# Patient Record
Sex: FEMALE | Race: WHITE | NOT HISPANIC OR LATINO | Employment: OTHER | URBAN - METROPOLITAN AREA
[De-identification: names, ages, dates, MRNs, and addresses within clinical notes are randomized per-mention and may not be internally consistent; named-entity substitution may affect disease eponyms.]

---

## 2017-02-15 DIAGNOSIS — I10 ESSENTIAL HYPERTENSION: ICD-10-CM

## 2017-02-15 RX ORDER — LOSARTAN POTASSIUM 50 MG/1
50 TABLET ORAL DAILY
Qty: 90 TABLET | Refills: 3 | Status: SHIPPED | OUTPATIENT
Start: 2017-02-15 | End: 2017-04-13 | Stop reason: SDUPTHER

## 2017-03-13 ENCOUNTER — LAB VISIT (OUTPATIENT)
Dept: LAB | Facility: HOSPITAL | Age: 72
End: 2017-03-13
Payer: MEDICARE

## 2017-03-13 ENCOUNTER — OFFICE VISIT (OUTPATIENT)
Dept: FAMILY MEDICINE | Facility: CLINIC | Age: 72
End: 2017-03-13
Payer: MEDICARE

## 2017-03-13 VITALS
HEART RATE: 101 BPM | HEIGHT: 63 IN | TEMPERATURE: 99 F | WEIGHT: 157.63 LBS | OXYGEN SATURATION: 98 % | SYSTOLIC BLOOD PRESSURE: 133 MMHG | BODY MASS INDEX: 27.93 KG/M2 | DIASTOLIC BLOOD PRESSURE: 68 MMHG

## 2017-03-13 DIAGNOSIS — E78.5 HYPERLIPIDEMIA, UNSPECIFIED HYPERLIPIDEMIA TYPE: ICD-10-CM

## 2017-03-13 DIAGNOSIS — Z23 ENCOUNTER FOR IMMUNIZATION: Primary | ICD-10-CM

## 2017-03-13 DIAGNOSIS — E11.9 TYPE 2 DIABETES MELLITUS WITHOUT COMPLICATION, WITHOUT LONG-TERM CURRENT USE OF INSULIN: ICD-10-CM

## 2017-03-13 DIAGNOSIS — F41.8 ANXIETY ASSOCIATED WITH DEPRESSION: ICD-10-CM

## 2017-03-13 LAB
ALBUMIN SERPL BCP-MCNC: 4.2 G/DL
ALP SERPL-CCNC: 90 U/L
ALT SERPL W/O P-5'-P-CCNC: 47 U/L
ANION GAP SERPL CALC-SCNC: 13 MMOL/L
AST SERPL-CCNC: 59 U/L
BILIRUB SERPL-MCNC: 0.9 MG/DL
BUN SERPL-MCNC: 13 MG/DL
CALCIUM SERPL-MCNC: 10.2 MG/DL
CHLORIDE SERPL-SCNC: 102 MMOL/L
CO2 SERPL-SCNC: 26 MMOL/L
CREAT SERPL-MCNC: 1.4 MG/DL
EST. GFR  (AFRICAN AMERICAN): 43 ML/MIN/1.73 M^2
EST. GFR  (NON AFRICAN AMERICAN): 37.3 ML/MIN/1.73 M^2
GLUCOSE SERPL-MCNC: 154 MG/DL
POTASSIUM SERPL-SCNC: 4.1 MMOL/L
PROT SERPL-MCNC: 8.5 G/DL
SODIUM SERPL-SCNC: 141 MMOL/L

## 2017-03-13 PROCEDURE — 1126F AMNT PAIN NOTED NONE PRSNT: CPT | Mod: S$GLB,,,

## 2017-03-13 PROCEDURE — 99499 UNLISTED E&M SERVICE: CPT | Mod: S$GLB,,,

## 2017-03-13 PROCEDURE — 99214 OFFICE O/P EST MOD 30 MIN: CPT | Mod: 25,S$GLB,,

## 2017-03-13 PROCEDURE — 1159F MED LIST DOCD IN RCRD: CPT | Mod: S$GLB,,,

## 2017-03-13 PROCEDURE — 3078F DIAST BP <80 MM HG: CPT | Mod: S$GLB,,,

## 2017-03-13 PROCEDURE — 90662 IIV NO PRSV INCREASED AG IM: CPT | Mod: S$GLB,,,

## 2017-03-13 PROCEDURE — 99999 PR PBB SHADOW E&M-EST. PATIENT-LVL III: CPT | Mod: PBBFAC,,,

## 2017-03-13 PROCEDURE — G0008 ADMIN INFLUENZA VIRUS VAC: HCPCS | Mod: S$GLB,,,

## 2017-03-13 PROCEDURE — 4010F ACE/ARB THERAPY RXD/TAKEN: CPT | Mod: S$GLB,,,

## 2017-03-13 PROCEDURE — 1160F RVW MEDS BY RX/DR IN RCRD: CPT | Mod: S$GLB,,,

## 2017-03-13 PROCEDURE — 1157F ADVNC CARE PLAN IN RCRD: CPT | Mod: S$GLB,,,

## 2017-03-13 PROCEDURE — 36415 COLL VENOUS BLD VENIPUNCTURE: CPT | Mod: PO

## 2017-03-13 PROCEDURE — 3045F PR MOST RECENT HEMOGLOBIN A1C LEVEL 7.0-9.0%: CPT | Mod: S$GLB,,,

## 2017-03-13 PROCEDURE — 3075F SYST BP GE 130 - 139MM HG: CPT | Mod: S$GLB,,,

## 2017-03-13 PROCEDURE — 83036 HEMOGLOBIN GLYCOSYLATED A1C: CPT

## 2017-03-13 PROCEDURE — 80053 COMPREHEN METABOLIC PANEL: CPT

## 2017-03-13 RX ORDER — METOPROLOL SUCCINATE 100 MG/1
100 TABLET, EXTENDED RELEASE ORAL DAILY
Qty: 90 TABLET | Refills: 3 | Status: SHIPPED | OUTPATIENT
Start: 2017-03-13 | End: 2018-04-13 | Stop reason: SDUPTHER

## 2017-03-13 RX ORDER — ROPINIROLE 1 MG/1
TABLET, FILM COATED ORAL
COMMUNITY
Start: 2017-02-16 | End: 2017-06-15 | Stop reason: SDUPTHER

## 2017-03-13 RX ORDER — DIAZEPAM 5 MG/1
5 TABLET ORAL EVERY 6 HOURS PRN
Qty: 40 TABLET | Refills: 5 | Status: SHIPPED | OUTPATIENT
Start: 2017-03-13 | End: 2018-04-13 | Stop reason: ALTCHOICE

## 2017-03-13 RX ORDER — PRAVASTATIN SODIUM 10 MG/1
TABLET ORAL
Qty: 90 TABLET | Refills: 3 | Status: SHIPPED | OUTPATIENT
Start: 2017-03-13 | End: 2018-04-13 | Stop reason: SDUPTHER

## 2017-03-13 NOTE — PROGRESS NOTES
Chief Complaint   Patient presents with    Medication Refill       HPI  Ramya Mayo is a 71 y.o. female who presents to the office today for refills of her medications, we will be happy to continue with her present medical regimen.  Patient has type 2 diabetes, she has been on a hemoglobin A1c level of about 7.5 for quite a long time.  No signs of low sugars, no polyuria, polydipsia, no endorgan damage regarding her type 2 diabetes.  She is up-to-date with ophthalmologic examination.  Pt is known to me.    HPI    PAST MEDICAL HISTORY:  Past Medical History:   Diagnosis Date    Diabetes mellitus type II     Hyperlipidemia     Hypertension     Stroke        PAST SURGICAL HISTORY:  Past Surgical History:   Procedure Laterality Date    4 MISCARRIAGES       8 PARA 4      HYSTERECTOMY         SOCIAL HISTORY:  Social History     Social History    Marital status:      Spouse name: N/A    Number of children: N/A    Years of education: N/A     Occupational History    Not on file.     Social History Main Topics    Smoking status: Never Smoker    Smokeless tobacco: Not on file      Comment: , lives with .  .      Alcohol use No    Drug use: No    Sexual activity: Not on file     Other Topics Concern    Not on file     Social History Narrative    Lives in [place].    2014.         FAMILY HISTORY:  Family History   Problem Relation Age of Onset    Diabetes Mother     Hypertension Mother     Kidney disease Mother     Vision loss Father     Cancer Father     Glaucoma Father     Heart disease Son     Hyperlipidemia Son     No Known Problems Sister     No Known Problems Brother     No Known Problems Maternal Aunt     No Known Problems Maternal Uncle     No Known Problems Paternal Aunt     No Known Problems Paternal Uncle     No Known Problems Maternal Grandmother     No Known Problems Maternal Grandfather     No Known Problems Paternal  Grandmother     No Known Problems Paternal Grandfather     Amblyopia Neg Hx     Blindness Neg Hx     Cataracts Neg Hx     Macular degeneration Neg Hx     Retinal detachment Neg Hx     Strabismus Neg Hx     Stroke Neg Hx     Thyroid disease Neg Hx        ALLERGIES AND MEDICATIONS: updated and reviewed.  Review of patient's allergies indicates:   Allergen Reactions    Lipitor [atorvastatin] Other (See Comments)     STROKE      Citalopram analogues Other (See Comments)     Hallucinations     Current Outpatient Prescriptions   Medication Sig Dispense Refill    amlodipine (NORVASC) 10 MG tablet Take 1 tablet (10 mg total) by mouth once daily. 90 tablet 3    antiox #11-om3-dha-epa-lut-gold (OCUVITE ADULT 50+) 250-5-1 mg Cap       aspirin 325 MG tablet Take 325 mg by mouth. 1 Tablet Oral Every day      glyBURIDE (DIABETA) 2.5 MG tablet Take 1 tablet (2.5 mg total) by mouth once daily. 90 tablet 3    losartan (COZAAR) 50 MG tablet Take 1 tablet (50 mg total) by mouth once daily. 90 tablet 3    metformin (GLUCOPHAGE) 500 MG tablet TAKE 1 TABLET(S) BY MOUTH TWICE DAILY WITH MEALS 90 tablet 3    metoprolol succinate (TOPROL-XL) 100 MG 24 hr tablet Take 1 tablet (100 mg total) by mouth once daily. 90 tablet 3    multivit & mins-ferrous glucon (CENTRUM) 9 mg/15 mL iron Liqd Take by mouth. 1 Liquid Oral Every day      pravastatin (PRAVACHOL) 10 MG tablet take 1 TABLET(S) BY MOUTH AT BEDTIME 90 tablet 3    ropinirole (REQUIP) 1 MG tablet       diazePAM (VALIUM) 5 MG tablet Take 1 tablet (5 mg total) by mouth every 6 (six) hours as needed for Anxiety. 40 tablet 5    triamcinolone acetonide 0.1% (KENALOG) 0.1 % cream Apply topically 2 (two) times daily. 45 g 5     No current facility-administered medications for this visit.        ROS  Review of Systems   Constitutional: Negative for appetite change and unexpected weight change.   Respiratory: Negative for shortness of breath.    Cardiovascular:        No  "exertional symptoms.   Endocrine: Negative for polydipsia and polyuria.   Neurological: Negative for numbness.   Psychiatric/Behavioral: Negative.        Physical Exam  Vitals:    03/13/17 1510   BP: 133/68   Pulse:    Temp:     Body mass index is 27.92 kg/(m^2).  Weight: 71.5 kg (157 lb 10.1 oz)   Height: 5' 3" (160 cm)     Physical Exam   Constitutional: She is oriented to person, place, and time. She appears well-developed and well-nourished. No distress.   HENT:   TMs are normal bilaterally.  Oropharynx is clear, no exudate.   Cardiovascular: Normal rate and regular rhythm.    Pulmonary/Chest: Effort normal and breath sounds normal.   Musculoskeletal: She exhibits no edema.   Neurological: She is alert and oriented to person, place, and time.   Psychiatric: She has a normal mood and affect. Her behavior is normal.   Vitals reviewed.  Protective Sensation (w/ 10 gram monofilament):  Right: Intact  Left: Intact    Visual Inspection:  Normal -  Bilateral    Pedal Pulses:   Right: Present  Left: Present    Posterior tibialis:   Right:Present  Left: Present      Health Maintenance       Date Due Completion Date    TETANUS VACCINE 12/21/1961 ---    Zoster Vaccine 12/21/2003 ---    Pneumococcal (65+) (2 of 2 - PCV13) 9/17/2014 9/17/2013    DEXA SCAN 9/24/2015 9/24/2012    Override on 9/18/2012: Done (scheduled)    Foot Exam 2/16/2017 2/16/2016    Override on 6/22/2015: Done    Hemoglobin A1c 2/16/2017 8/16/2016    Override on 10/21/2014: Done    Lipid Panel 8/16/2017 8/16/2016    Eye Exam 9/20/2017 9/20/2016    Override on 9/20/2016: Done    Override on 4/24/2013: Done (May 7, 13)    Mammogram 8/16/2018 8/16/2016 (Declined)    Override on 8/16/2016: Declined    Override on 4/24/2013: Declined    Override on 9/18/2012: Declined    Colonoscopy 3/13/2027 3/13/2017 (Declined)    Override on 3/13/2017: Declined    Override on 6/22/2015: Done (Hemoccult neg per PHN)    Override on 4/24/2013: Not Clinically Appropriate " (Hemoccults)    Override on 5/25/2012: Declined (Neg hemoccults.)          Assessment & Plan    1. Hyperlipidemia, unspecified hyperlipidemia type  Continue present medical regimen.  - metoprolol succinate (TOPROL-XL) 100 MG 24 hr tablet; Take 1 tablet (100 mg total) by mouth once daily.  Dispense: 90 tablet; Refill: 3  - pravastatin (PRAVACHOL) 10 MG tablet; take 1 TABLET(S) BY MOUTH AT BEDTIME  Dispense: 90 tablet; Refill: 3    2. Anxiety associated with depression  Continue present medical regimen.  - diazePAM (VALIUM) 5 MG tablet; Take 1 tablet (5 mg total) by mouth every 6 (six) hours as needed for Anxiety.  Dispense: 40 tablet; Refill: 5    3. Encounter for immunization  Patient was reminded that she is due for Prevnar 13 at next visit.  - Influenza - High Dose (65+) (PF) (IM)    4. Type 2 diabetes mellitus without complication, without long-term current use of insulin  We'll obtain parameters.  - Comprehensive metabolic panel; Future  - Hemoglobin A1c; Future  - Microalbumin/creatinine urine ratio      Return in about 6 months (around 9/13/2017).

## 2017-03-13 NOTE — MR AVS SNAPSHOT
[facility]  [address]  [place] [address]  Phone: [phone]  Fax: [phone]                  Ramya Mayo   3/13/2017 2:40 PM   Office Visit    Description:  Female : 1945   Provider:  Jesús Garnica Jr., MD   Department:  [facility]           Reason for Visit     Medication Refill           Diagnoses this Visit        Comments    Encounter for immunization    -  Primary     Hyperlipidemia, unspecified hyperlipidemia type         Anxiety associated with depression         Type 2 diabetes mellitus without complication, without long-term current use of insulin                To Do List           Goals (5 Years of Data)     None      Follow-Up and Disposition     Return in about 6 months (around 2017).       These Medications        Disp Refills Start End    metoprolol succinate (TOPROL-XL) 100 MG 24 hr tablet 90 tablet 3 3/13/2017     Take 1 tablet (100 mg total) by mouth once daily. - Oral    Pharmacy: [facility] [address] [place] [address] [address] Ph #: [phone]       pravastatin (PRAVACHOL) 10 MG tablet 90 tablet 3 3/13/2017     take 1 TABLET(S) BY MOUTH AT BEDTIME    Pharmacy: [address] Ph #: [phone]       diazePAM (VALIUM) 5 MG tablet 40 tablet 5 3/13/2017 2017    Take 1 tablet (5 mg total) by mouth every 6 (six) hours as needed for Anxiety. - Oral    Pharmacy: [address] Ph #: [phone]         [facility] On [facility]     [facility] Nurse Care Line -  Assistance  Registered nurses in the [facility] provide clinical advisement, health education, appointment booking, and other advisory services.  Call for this free service at [phone].             Medications           Message regarding Medications     Verify the changes and/or additions to your medication regime listed below are the same as  "discussed with your clinician today.  If any of these changes or additions are incorrect, please notify your healthcare provider.        CHANGE how you are taking these medications     Start Taking Instead of    diazePAM (VALIUM) 5 MG tablet diazepam (VALIUM) 5 MG tablet    Dosage:  Take 1 tablet (5 mg total) by mouth every 6 (six) hours as needed for Anxiety. Dosage:  Take 1 tablet (5 mg total) by mouth every 6 (six) hours as needed for Anxiety.    Reason for Change:  Reorder            Verify that the below list of medications is an accurate representation of the medications you are currently taking.  If none reported, the list may be blank. If incorrect, please contact your healthcare provider. Carry this list with you in case of emergency.           Current Medications     amlodipine (NORVASC) 10 MG tablet Take 1 tablet (10 mg total) by mouth once daily.    antiox #11-om3-dha-epa-lut-gold (OCUVITE ADULT 50+) 250-5-1 mg Cap     aspirin 325 MG tablet Take 325 mg by mouth. 1 Tablet Oral Every day    glyBURIDE (DIABETA) 2.5 MG tablet Take 1 tablet (2.5 mg total) by mouth once daily.    losartan (COZAAR) 50 MG tablet Take 1 tablet (50 mg total) by mouth once daily.    metformin (GLUCOPHAGE) 500 MG tablet TAKE 1 TABLET(S) BY MOUTH TWICE DAILY WITH MEALS    metoprolol succinate (TOPROL-XL) 100 MG 24 hr tablet Take 1 tablet (100 mg total) by mouth once daily.    multivit & mins-ferrous glucon (CENTRUM) 9 mg/15 mL iron Liqd Take by mouth. 1 Liquid Oral Every day    pravastatin (PRAVACHOL) 10 MG tablet take 1 TABLET(S) BY MOUTH AT BEDTIME    ropinirole (REQUIP) 1 MG tablet     diazePAM (VALIUM) 5 MG tablet Take 1 tablet (5 mg total) by mouth every 6 (six) hours as needed for Anxiety.    triamcinolone acetonide 0.1% (KENALOG) 0.1 % cream Apply topically 2 (two) times daily.           Clinical Reference Information           Your Vitals Were     BP Pulse Temp Height Weight SpO2    133/68 101 98.6 °F (37 °C) 5' 3" (1.6 m) " 71.5 kg (157 lb 10.1 oz) 98%    BMI                27.92 kg/m2          Blood Pressure          Most Recent Value    BP  133/68      Allergies as of 3/13/2017     Lipitor [Atorvastatin]    Citalopram Analogues      Immunizations Administered on Date of Encounter - 3/13/2017     Name Date Dose VIS Date Route    Influenza - High Dose  Incomplete 0.5 mL 8/7/2015 Intramuscular      Orders Placed During Today's Visit      Normal Orders This Visit    Influenza - High Dose (65+) (PF) (IM)     Microalbumin/creatinine urine ratio     Future Labs/Procedures Expected by Expires    Comprehensive metabolic panel  3/13/2017 3/13/2018    Hemoglobin A1c  3/13/2017 3/13/2018      [facility] Sign-Up     Activating your [facility] account is as easy as 1-2-3!     1) Visit [email or web address], select Sign Up Now, enter this activation code and your date of birth, then select Next.  [email or web address]  Expires: 4/27/2017  3:14 PM      2) Create a username and password to use when you visit [facility] in the future and select a security question in case you lose your password and select Next.    3) Enter your e-mail address and click Sign Up!    Additional Information  If you have questions, please e-mail [email or web address] or call [phone] to talk to our [facility] staff. Remember, [facility] is NOT to be used for urgent needs. For medical emergencies, dial 911.         Language Assistance Services     ATTENTION: Language assistance services are available, free of charge. Please call [phone].      ATENCIÓN: Si habla español, tiene a singer disposición servicios gratuitos de asistencia lingüística. Llame al [phone].     CHÚ Ý: N?u b?n nói Ti?ng Vi?t, có các d?ch v? h? tr? ngôn ng? mi?n phí dành cho b?n. G?i s? [phone].         [facility] complies with applicable Federal civil rights laws and does not discriminate on the basis of race, color, national origin, age, disability, or sex.

## 2017-03-14 LAB
CREAT UR-MCNC: 229 MG/DL
ESTIMATED AVG GLUCOSE: 171 MG/DL
HBA1C MFR BLD HPLC: 7.6 %
MICROALBUMIN UR DL<=1MG/L-MCNC: 65 UG/ML
MICROALBUMIN/CREATININE RATIO: 28.4 UG/MG

## 2017-03-16 ENCOUNTER — TELEPHONE (OUTPATIENT)
Dept: FAMILY MEDICINE | Facility: CLINIC | Age: 72
End: 2017-03-16

## 2017-03-16 NOTE — TELEPHONE ENCOUNTER
----- Message from Jeaneth Cummins sent at 3/16/2017  3:48 PM CDT -----  Pt needs test strips refilled. Please call [phone]. Thanks

## 2017-03-16 NOTE — TELEPHONE ENCOUNTER
----- Message from Justinekimmie Ye sent at 3/16/2017  9:39 AM CDT -----  Contact: [facility]  Request clinical note for pt's diabetic supplies. Please fax it over at [phone]. Thanks!

## 2017-04-06 RX ORDER — METFORMIN HYDROCHLORIDE 500 MG/1
TABLET ORAL
Qty: 180 TABLET | Refills: 1 | Status: SHIPPED | OUTPATIENT
Start: 2017-04-06 | End: 2017-11-07 | Stop reason: SDUPTHER

## 2017-04-13 ENCOUNTER — TELEPHONE (OUTPATIENT)
Dept: FAMILY MEDICINE | Facility: CLINIC | Age: 72
End: 2017-04-13

## 2017-04-13 DIAGNOSIS — E78.5 HYPERLIPIDEMIA, UNSPECIFIED HYPERLIPIDEMIA TYPE: ICD-10-CM

## 2017-04-13 DIAGNOSIS — I10 ESSENTIAL HYPERTENSION: ICD-10-CM

## 2017-04-13 RX ORDER — GLYBURIDE 2.5 MG/1
2.5 TABLET ORAL DAILY
Qty: 90 TABLET | Refills: 3 | Status: SHIPPED | OUTPATIENT
Start: 2017-04-13 | End: 2017-04-13 | Stop reason: SDUPTHER

## 2017-04-13 RX ORDER — AMLODIPINE BESYLATE 10 MG/1
10 TABLET ORAL DAILY
Qty: 90 TABLET | Refills: 3 | Status: SHIPPED | OUTPATIENT
Start: 2017-04-13 | End: 2017-04-13 | Stop reason: SDUPTHER

## 2017-04-13 RX ORDER — GLYBURIDE 2.5 MG/1
2.5 TABLET ORAL DAILY
Qty: 90 TABLET | Refills: 3 | Status: SHIPPED | OUTPATIENT
Start: 2017-04-13 | End: 2018-04-13 | Stop reason: SDUPTHER

## 2017-04-13 RX ORDER — LOSARTAN POTASSIUM 50 MG/1
50 TABLET ORAL DAILY
Qty: 90 TABLET | Refills: 3 | Status: SHIPPED | OUTPATIENT
Start: 2017-04-13 | End: 2017-04-13 | Stop reason: SDUPTHER

## 2017-04-13 RX ORDER — LOSARTAN POTASSIUM 50 MG/1
50 TABLET ORAL DAILY
Qty: 90 TABLET | Refills: 3 | Status: SHIPPED | OUTPATIENT
Start: 2017-04-13 | End: 2018-04-13 | Stop reason: SDUPTHER

## 2017-04-13 RX ORDER — AMLODIPINE BESYLATE 10 MG/1
10 TABLET ORAL DAILY
Qty: 90 TABLET | Refills: 3 | Status: SHIPPED | OUTPATIENT
Start: 2017-04-13 | End: 2018-04-13 | Stop reason: SDUPTHER

## 2017-06-15 RX ORDER — ROPINIROLE 1 MG/1
TABLET, FILM COATED ORAL
Qty: 180 TABLET | Refills: 0 | Status: SHIPPED | OUTPATIENT
Start: 2017-06-15 | End: 2017-06-19 | Stop reason: SDUPTHER

## 2017-06-19 RX ORDER — ROPINIROLE 1 MG/1
TABLET, FILM COATED ORAL
Qty: 180 TABLET | Refills: 0 | Status: SHIPPED | OUTPATIENT
Start: 2017-06-19 | End: 2017-06-22 | Stop reason: SDUPTHER

## 2017-06-22 RX ORDER — ROPINIROLE 1 MG/1
TABLET, FILM COATED ORAL
Qty: 180 TABLET | Refills: 0 | Status: SHIPPED | OUTPATIENT
Start: 2017-06-22 | End: 2018-04-13 | Stop reason: SDUPTHER

## 2017-09-20 DIAGNOSIS — F41.8 ANXIETY ASSOCIATED WITH DEPRESSION: ICD-10-CM

## 2017-09-21 RX ORDER — DIAZEPAM 5 MG/1
5 TABLET ORAL EVERY 6 HOURS PRN
Qty: 40 TABLET | Refills: 5 | OUTPATIENT
Start: 2017-09-21 | End: 2017-10-21

## 2017-09-22 DIAGNOSIS — E11.9 TYPE 2 DIABETES MELLITUS WITHOUT COMPLICATION: ICD-10-CM

## 2017-09-29 ENCOUNTER — TELEPHONE (OUTPATIENT)
Dept: FAMILY MEDICINE | Facility: CLINIC | Age: 72
End: 2017-09-29

## 2017-09-29 NOTE — TELEPHONE ENCOUNTER
Called patient to setup an appt to est care... She just stated thanks and hung up and didn't let me make the appt.

## 2017-09-29 NOTE — TELEPHONE ENCOUNTER
----- Message from Nancy Bolton sent at 9/28/2017  2:59 PM CDT -----  Contact: self  Calling regarding status of refill request for -- diazePAM (VALIUM) 5 MG tablet .    [phone]  LL

## 2017-10-06 DIAGNOSIS — E11.9 TYPE 2 DIABETES MELLITUS WITHOUT COMPLICATION: ICD-10-CM

## 2017-11-07 RX ORDER — METFORMIN HYDROCHLORIDE 500 MG/1
500 TABLET ORAL 2 TIMES DAILY WITH MEALS
Qty: 180 TABLET | Refills: 0 | Status: SHIPPED | OUTPATIENT
Start: 2017-11-07 | End: 2018-04-13 | Stop reason: SDUPTHER

## 2018-04-12 NOTE — PROGRESS NOTES
This note was created by combination of typed  and Dragon dictation.  Transcription errors may be present.  If there are any questions, please contact me.    Assessment / Plan:   Essential hypertension - not to goal, increase losartan to 100; stay on toprol and amlodipine.   -     metoprolol succinate (TOPROL-XL) 100 MG 24 hr tablet; Take 1 tablet (100 mg total) by mouth once daily.  Dispense: 90 tablet; Refill: 3  -     losartan (COZAAR) 100 MG tablet; Take 1 tablet (100 mg total) by mouth once daily.  Dispense: 90 tablet; Refill: 1  -     amLODIPine (NORVASC) 10 MG tablet; Take 1 tablet (10 mg total) by mouth once daily.  Dispense: 90 tablet; Refill: 3    Anxiety associated with depression    Need for vaccination for Strep pneumoniae  -     Pneumococcal Conjugate Vaccine (13 Valent) (IM)    Uncontrolled type 2 diabetes mellitus without complication, without long-term current use of insulin  Diabetes mellitus type 2 without retinopathy  -check a1c.  Has room to move on the metformin. Referral to eye doctor. On ASA. On statin.  -     metFORMIN (GLUCOPHAGE) 500 MG tablet; Take 1 tablet (500 mg total) by mouth 2 (two) times daily with meals.  Dispense: 180 tablet; Refill: 3  -     glyBURIDE (DIABETA) 2.5 MG tablet; Take 1 tablet (2.5 mg total) by mouth once daily.  Dispense: 90 tablet; Refill: 3  -     Comprehensive metabolic panel; Future; Expected date: 04/13/2018  -     Hemoglobin A1c; Future; Expected date: 04/13/2018  -     Ambulatory referral to Optometry    Mixed hyperlipidemia - pravastatin low dose.  Hx of atorvastatin  -     pravastatin (PRAVACHOL) 10 MG tablet; take 1 TABLET(S) BY MOUTH AT BEDTIME  Dispense: 90 tablet; Refill: 3  -     Cholesterol, total; Future; Expected date: 04/13/2018  -     HDL CHOLESTEROL; Future; Expected date: 04/13/2018    Restless legs syndrome - refilled requip.  -     rOPINIRole (REQUIP) 1 MG tablet; Take 1 tablet (1 mg total) by mouth 3 (three) times daily.  Dispense:  180 tablet; Refill: 3    Adjustment disorder, unspecified type - avoid BZD.  Trial of trazodone.  If not helpful - trial of remeron.  -     traZODone (DESYREL) 50 MG tablet; 1/2 to 1 tablet, 1 hour prior to bedtime, as needed for sleep  Dispense: 30 tablet; Refill: 11    Other orders  -     Cancel: diazePAM (VALIUM) 5 MG tablet; Take 1 tablet (5 mg total) by mouth every 6 (six) hours as needed for Anxiety.  Dispense: 40 tablet; Refill: 5  -     Cancel: aspirin 325 MG tablet; Take 1 tablet (325 mg total) by mouth. 1 Tablet Oral Every day  -     Cancel: C,E,zinc,copper 11-omega3s-lut (OCUVITE ADULT 50 PLUS) 250-5-1 mg Cap; Take by mouth. 1 Capsule Oral Every day          Medications Discontinued During This Encounter   Medication Reason    diazePAM (VALIUM) 5 MG tablet Therapy completed    ropinirole (REQUIP) 1 MG tablet Reorder    pravastatin (PRAVACHOL) 10 MG tablet Reorder    metoprolol succinate (TOPROL-XL) 100 MG 24 hr tablet Reorder    metFORMIN (GLUCOPHAGE) 500 MG tablet Reorder    losartan (COZAAR) 50 MG tablet Reorder    glyBURIDE (DIABETA) 2.5 MG tablet Reorder    amlodipine (NORVASC) 10 MG tablet Reorder     Modified Medications    Modified Medication Previous Medication    AMLODIPINE (NORVASC) 10 MG TABLET amlodipine (NORVASC) 10 MG tablet       Take 1 tablet (10 mg total) by mouth once daily.    Take 1 tablet (10 mg total) by mouth once daily.    GLYBURIDE (DIABETA) 2.5 MG TABLET glyBURIDE (DIABETA) 2.5 MG tablet       Take 1 tablet (2.5 mg total) by mouth once daily.    Take 1 tablet (2.5 mg total) by mouth once daily.    LOSARTAN (COZAAR) 100 MG TABLET losartan (COZAAR) 50 MG tablet       Take 1 tablet (100 mg total) by mouth once daily.    Take 1 tablet (50 mg total) by mouth once daily.    METFORMIN (GLUCOPHAGE) 500 MG TABLET metFORMIN (GLUCOPHAGE) 500 MG tablet       Take 1 tablet (500 mg total) by mouth 2 (two) times daily with meals.    Take 1 tablet (500 mg total) by mouth 2 (two) times  "daily with meals. Needs new primary care doctor    METOPROLOL SUCCINATE (TOPROL-XL) 100 MG 24 HR TABLET metoprolol succinate (TOPROL-XL) 100 MG 24 hr tablet       Take 1 tablet (100 mg total) by mouth once daily.    Take 1 tablet (100 mg total) by mouth once daily.    PRAVASTATIN (PRAVACHOL) 10 MG TABLET pravastatin (PRAVACHOL) 10 MG tablet       take 1 TABLET(S) BY MOUTH AT BEDTIME    take 1 TABLET(S) BY MOUTH AT BEDTIME    ROPINIROLE (REQUIP) 1 MG TABLET ropinirole (REQUIP) 1 MG tablet       Take 1 tablet (1 mg total) by mouth 3 (three) times daily.    TAKE ONE TABLET BY MOUTH THREE TIMES DAILY     New Prescriptions    TRAZODONE (DESYREL) 50 MG TABLET    1/2 to 1 tablet, 1 hour prior to bedtime, as needed for sleep         Follow Up: No Follow-up on file. Nurse visit 1 month for BP check; OV 3 months.      Subjective:     Chief Complaint   Patient presents with    [facility]    Hypertension    Diabetes    Hyperlipidemia       SLICK  Ramya is a 72 y.o. female, last appointment with this clinic was Visit date not found.    No LMP recorded. Patient has had a hysterectomy.    Former pt of Dr. Garnica.  Diabetes type 2  Hypertension  Hyperlipidemia  History of CVA  9/24/2012 DEXA scan normal  Restless leg syndrome, ropinirole    On pravastatin, glyburide 2.5, metformin 500 twice a day, Toprol-, amlodipine 10, losartan 50    Labs 3/2017 LFTs elevated mildly.  8/2016 lipid profile high  3/2017 A1c 7.6.  Microalbumin borderline 28    Thinks some degree of white coat syndrome.  Denies SE of meds.  No outside BP readings. So unclear if this is just "white coat syndrome". Denies CP, dyspnea, headache.    Requesting RF on diazepam.  Hx of alprazolam. Stressors - [place].  Brother with cancer, home hospice.  Hx of diazepam and xanax.  PRN.  Estimates every 3 days. Recalls remote hx of zoloft did not find it effective. Citalopram with SE of hallucinations.  Would take the BZD in the evening. Talked about high " risk nature of BZD and she is agreeable to try alterantive.    Notes change in visual acuity.  Due for optometry exam.    Patient Care Team:  Kameron Donato MD as PCP - General (Internal Medicine)    Patient Active Problem List    Diagnosis Date Noted    Diabetes mellitus type 2 without retinopathy 2016    Mixed hyperlipidemia 2012    Essential hypertension 2012    Diabetes type 2, uncontrolled 2012    H/O: stroke with residual effects 2012       PAST MEDICAL HISTORY:  Past Medical History:   Diagnosis Date    Diabetes mellitus type II     Hyperlipidemia     Hypertension     Stroke        PAST SURGICAL HISTORY:  Past Surgical History:   Procedure Laterality Date    4 MISCARRIAGES       8 PARA 4      HYSTERECTOMY         SOCIAL HISTORY:  Social History     Social History    Marital status:      Spouse name: N/A    Number of children: N/A    Years of education: N/A     Occupational History    Not on file.     Social History Main Topics    Smoking status: Never Smoker    Smokeless tobacco: Not on file      Comment: , lives with .  .      Alcohol use No    Drug use: No    Sexual activity: Not on file     Other Topics Concern    Not on file     Social History Narrative    Lives in [place].    2014.         ALLERGIES AND MEDICATIONS: updated and reviewed.  Review of patient's allergies indicates:   Allergen Reactions    Lipitor [atorvastatin] Other (See Comments)     STROKE      Citalopram analogues Other (See Comments)     Hallucinations     Current Outpatient Prescriptions   Medication Sig Dispense Refill    amlodipine (NORVASC) 10 MG tablet Take 1 tablet (10 mg total) by mouth once daily. 90 tablet 3    antiox #11-om3-dha-epa-lut-gold (OCUVITE ADULT 50+) 250-5-1 mg Cap       aspirin 325 MG tablet Take 325 mg by mouth. 1 Tablet Oral Every day      BLOOD SUGAR DIAGNOSTIC (TRUE METRIX GLUCOSE TEST STRIP MISC) 1  "strip by Misc.(Non-Drug; Combo Route) route 3 (three) times daily.      glyBURIDE (DIABETA) 2.5 MG tablet Take 1 tablet (2.5 mg total) by mouth once daily. 90 tablet 3    losartan (COZAAR) 50 MG tablet Take 1 tablet (50 mg total) by mouth once daily. 90 tablet 3    metFORMIN (GLUCOPHAGE) 500 MG tablet Take 1 tablet (500 mg total) by mouth 2 (two) times daily with meals. Needs new primary care doctor 180 tablet 0    metoprolol succinate (TOPROL-XL) 100 MG 24 hr tablet Take 1 tablet (100 mg total) by mouth once daily. 90 tablet 3    multivit & mins-ferrous glucon (CENTRUM) 9 mg/15 mL iron Liqd Take by mouth. 1 Liquid Oral Every day      pravastatin (PRAVACHOL) 10 MG tablet take 1 TABLET(S) BY MOUTH AT BEDTIME 90 tablet 3    ropinirole (REQUIP) 1 MG tablet TAKE ONE TABLET BY MOUTH THREE TIMES DAILY 180 tablet 0    diazePAM (VALIUM) 5 MG tablet Take 1 tablet (5 mg total) by mouth every 6 (six) hours as needed for Anxiety. 40 tablet 5    triamcinolone acetonide 0.1% (KENALOG) 0.1 % cream Apply topically 2 (two) times daily. 45 g 5     No current facility-administered medications for this visit.        Review of Systems   All other systems reviewed and are negative.      Objective:   Physical Exam   Vitals:    04/13/18 1307 04/13/18 1330   BP: (!) 179/84 (!) 160/80   BP Location:  Left arm   Patient Position:  Sitting   BP Method:  Large (Manual)   Pulse: 84    Temp: 98.4 °F (36.9 °C)    TempSrc: Oral    SpO2: 98%    Weight: 70.5 kg (155 lb 6.8 oz)    Height: 5' 2" (1.575 m)     Body mass index is 28.43 kg/m².  Weight: 70.5 kg (155 lb 6.8 oz)   Height: 5' 2" (157.5 cm)     Physical Exam   Constitutional: She is oriented to person, place, and time. She appears well-developed and well-nourished. No distress.   Eyes: EOM are normal.   Cardiovascular: Normal rate and regular rhythm.    No murmur heard.  Pulses:       Dorsalis pedis pulses are 3+ on the right side, and 3+ on the left side.        Posterior tibial " pulses are 3+ on the right side, and 3+ on the left side.   2/6 systolic murmur into the carotids.   Pulmonary/Chest: Effort normal and breath sounds normal.   Musculoskeletal: Normal range of motion.        Right foot: There is no deformity.        Left foot: There is no deformity.   Feet:   Right Foot:   Protective Sensation: 5 sites tested. 5 sites sensed.   Skin Integrity: Negative for ulcer, blister, skin breakdown, erythema or warmth.   Left Foot:   Protective Sensation: 5 sites tested. 5 sites sensed.   Skin Integrity: Negative for ulcer, blister, skin breakdown, erythema or warmth.   Neurological: She is alert and oriented to person, place, and time. Coordination normal.   Skin: Skin is warm and dry.   Psychiatric: She has a normal mood and affect. Her behavior is normal. Thought content normal.

## 2018-04-13 ENCOUNTER — OFFICE VISIT (OUTPATIENT)
Dept: FAMILY MEDICINE | Facility: CLINIC | Age: 73
End: 2018-04-13
Payer: MEDICARE

## 2018-04-13 ENCOUNTER — LAB VISIT (OUTPATIENT)
Dept: LAB | Facility: HOSPITAL | Age: 73
End: 2018-04-13
Attending: INTERNAL MEDICINE
Payer: MEDICARE

## 2018-04-13 VITALS
OXYGEN SATURATION: 98 % | HEART RATE: 84 BPM | HEIGHT: 62 IN | TEMPERATURE: 98 F | BODY MASS INDEX: 28.61 KG/M2 | WEIGHT: 155.44 LBS | SYSTOLIC BLOOD PRESSURE: 160 MMHG | DIASTOLIC BLOOD PRESSURE: 80 MMHG

## 2018-04-13 DIAGNOSIS — F41.8 ANXIETY ASSOCIATED WITH DEPRESSION: ICD-10-CM

## 2018-04-13 DIAGNOSIS — I10 ESSENTIAL HYPERTENSION: Primary | ICD-10-CM

## 2018-04-13 DIAGNOSIS — G25.81 RESTLESS LEGS SYNDROME: ICD-10-CM

## 2018-04-13 DIAGNOSIS — E78.2 MIXED HYPERLIPIDEMIA: ICD-10-CM

## 2018-04-13 DIAGNOSIS — F43.20 ADJUSTMENT DISORDER, UNSPECIFIED TYPE: ICD-10-CM

## 2018-04-13 DIAGNOSIS — E11.9 DIABETES MELLITUS TYPE 2 WITHOUT RETINOPATHY: ICD-10-CM

## 2018-04-13 DIAGNOSIS — Z23 NEED FOR VACCINATION FOR STREP PNEUMONIAE: ICD-10-CM

## 2018-04-13 LAB
ALBUMIN SERPL BCP-MCNC: 4.1 G/DL
ALP SERPL-CCNC: 98 U/L
ALT SERPL W/O P-5'-P-CCNC: 43 U/L
ANION GAP SERPL CALC-SCNC: 10 MMOL/L
AST SERPL-CCNC: 48 U/L
BILIRUB SERPL-MCNC: 0.6 MG/DL
BUN SERPL-MCNC: 12 MG/DL
CALCIUM SERPL-MCNC: 10.2 MG/DL
CHLORIDE SERPL-SCNC: 101 MMOL/L
CHOLEST SERPL-MCNC: 232 MG/DL
CO2 SERPL-SCNC: 28 MMOL/L
CREAT SERPL-MCNC: 0.8 MG/DL
EST. GFR  (AFRICAN AMERICAN): >60 ML/MIN/1.73 M^2
EST. GFR  (NON AFRICAN AMERICAN): >60 ML/MIN/1.73 M^2
ESTIMATED AVG GLUCOSE: 177 MG/DL
GLUCOSE SERPL-MCNC: 107 MG/DL
HBA1C MFR BLD HPLC: 7.8 %
HDLC SERPL-MCNC: 52 MG/DL
POTASSIUM SERPL-SCNC: 4.1 MMOL/L
PROT SERPL-MCNC: 8.2 G/DL
SODIUM SERPL-SCNC: 139 MMOL/L

## 2018-04-13 PROCEDURE — 90670 PCV13 VACCINE IM: CPT | Mod: S$GLB,,, | Performed by: INTERNAL MEDICINE

## 2018-04-13 PROCEDURE — G0009 ADMIN PNEUMOCOCCAL VACCINE: HCPCS | Mod: S$GLB,,, | Performed by: INTERNAL MEDICINE

## 2018-04-13 PROCEDURE — 99214 OFFICE O/P EST MOD 30 MIN: CPT | Mod: S$GLB,,, | Performed by: INTERNAL MEDICINE

## 2018-04-13 PROCEDURE — 83718 ASSAY OF LIPOPROTEIN: CPT

## 2018-04-13 PROCEDURE — 99999 PR PBB SHADOW E&M-EST. PATIENT-LVL IV: CPT | Mod: PBBFAC,,, | Performed by: INTERNAL MEDICINE

## 2018-04-13 PROCEDURE — 83036 HEMOGLOBIN GLYCOSYLATED A1C: CPT

## 2018-04-13 PROCEDURE — 80053 COMPREHEN METABOLIC PANEL: CPT

## 2018-04-13 PROCEDURE — 82465 ASSAY BLD/SERUM CHOLESTEROL: CPT

## 2018-04-13 PROCEDURE — 3077F SYST BP >= 140 MM HG: CPT | Mod: CPTII,S$GLB,, | Performed by: INTERNAL MEDICINE

## 2018-04-13 PROCEDURE — 36415 COLL VENOUS BLD VENIPUNCTURE: CPT | Mod: PO

## 2018-04-13 PROCEDURE — 3079F DIAST BP 80-89 MM HG: CPT | Mod: CPTII,S$GLB,, | Performed by: INTERNAL MEDICINE

## 2018-04-13 RX ORDER — METFORMIN HYDROCHLORIDE 500 MG/1
500 TABLET ORAL 2 TIMES DAILY WITH MEALS
Qty: 180 TABLET | Refills: 3 | Status: SHIPPED | OUTPATIENT
Start: 2018-04-13 | End: 2018-04-18 | Stop reason: SDUPTHER

## 2018-04-13 RX ORDER — LOSARTAN POTASSIUM 100 MG/1
100 TABLET ORAL DAILY
Qty: 90 TABLET | Refills: 1 | Status: SHIPPED | OUTPATIENT
Start: 2018-04-13 | End: 2018-04-27 | Stop reason: SDUPTHER

## 2018-04-13 RX ORDER — GLYBURIDE 2.5 MG/1
2.5 TABLET ORAL DAILY
Qty: 90 TABLET | Refills: 3 | Status: SHIPPED | OUTPATIENT
Start: 2018-04-13 | End: 2018-07-05 | Stop reason: SDUPTHER

## 2018-04-13 RX ORDER — DIAZEPAM 5 MG/1
5 TABLET ORAL EVERY 6 HOURS PRN
Qty: 40 TABLET | Refills: 5 | Status: CANCELLED | OUTPATIENT
Start: 2018-04-13 | End: 2018-05-13

## 2018-04-13 RX ORDER — METOPROLOL SUCCINATE 100 MG/1
100 TABLET, EXTENDED RELEASE ORAL DAILY
Qty: 90 TABLET | Refills: 3 | Status: SHIPPED | OUTPATIENT
Start: 2018-04-13 | End: 2018-07-05 | Stop reason: SDUPTHER

## 2018-04-13 RX ORDER — ASPIRIN 325 MG
325 TABLET ORAL
Status: CANCELLED | COMMUNITY
Start: 2018-04-13

## 2018-04-13 RX ORDER — PRAVASTATIN SODIUM 10 MG/1
TABLET ORAL
Qty: 90 TABLET | Refills: 3 | Status: SHIPPED | OUTPATIENT
Start: 2018-04-13 | End: 2018-04-19 | Stop reason: SDUPTHER

## 2018-04-13 RX ORDER — ROPINIROLE 1 MG/1
1 TABLET, FILM COATED ORAL 3 TIMES DAILY
Qty: 180 TABLET | Refills: 3 | Status: SHIPPED | OUTPATIENT
Start: 2018-04-13 | End: 2018-07-05 | Stop reason: SDUPTHER

## 2018-04-13 RX ORDER — TRAZODONE HYDROCHLORIDE 50 MG/1
TABLET ORAL
Qty: 30 TABLET | Refills: 11 | Status: SHIPPED | OUTPATIENT
Start: 2018-04-13 | End: 2018-07-05 | Stop reason: SDUPTHER

## 2018-04-13 RX ORDER — AMLODIPINE BESYLATE 10 MG/1
10 TABLET ORAL DAILY
Qty: 90 TABLET | Refills: 3 | Status: SHIPPED | OUTPATIENT
Start: 2018-04-13 | End: 2018-07-05 | Stop reason: SDUPTHER

## 2018-04-13 NOTE — PROGRESS NOTES
Prevnar-13 vaccination administered. Tolerated well, instructed to wait 15 min for observation. No reaction noted at discharge.

## 2018-04-16 ENCOUNTER — TELEPHONE (OUTPATIENT)
Dept: OPTOMETRY | Facility: CLINIC | Age: 73
End: 2018-04-16

## 2018-04-18 DIAGNOSIS — E11.9 DIABETES MELLITUS TYPE 2 WITHOUT RETINOPATHY: ICD-10-CM

## 2018-04-18 NOTE — PROGRESS NOTES
a1c high on metformin 500 BID  Lipid high on pravastatin; hx of lipitor would go back to lipitor.    Please call pt - her diabetes is high.  Increase the metformin - take 2 pills twice a day.  Her cholesterol was high. Change pravastatin to lipitor (atorvastatin) 40 mg daily.  Will update prescriptions to pharmacy.    Follow up fasting in 3 months.

## 2018-04-19 DIAGNOSIS — E11.9 DIABETES MELLITUS TYPE 2 WITHOUT RETINOPATHY: ICD-10-CM

## 2018-04-19 DIAGNOSIS — E78.2 MIXED HYPERLIPIDEMIA: ICD-10-CM

## 2018-04-19 RX ORDER — METFORMIN HYDROCHLORIDE 500 MG/1
1000 TABLET ORAL 2 TIMES DAILY WITH MEALS
Qty: 360 TABLET | Refills: 3 | Status: SHIPPED | OUTPATIENT
Start: 2018-04-19 | End: 2018-07-05 | Stop reason: SDUPTHER

## 2018-04-19 RX ORDER — PRAVASTATIN SODIUM 80 MG/1
TABLET ORAL
Qty: 90 TABLET | Refills: 3 | Status: SHIPPED | OUTPATIENT
Start: 2018-04-19 | End: 2018-07-05 | Stop reason: SDUPTHER

## 2018-04-27 DIAGNOSIS — I10 ESSENTIAL HYPERTENSION: ICD-10-CM

## 2018-04-27 RX ORDER — LOSARTAN POTASSIUM 100 MG/1
100 TABLET ORAL DAILY
Qty: 90 TABLET | Refills: 1 | Status: SHIPPED | OUTPATIENT
Start: 2018-04-27 | End: 2018-07-05 | Stop reason: SDUPTHER

## 2018-05-11 ENCOUNTER — CLINICAL SUPPORT (OUTPATIENT)
Dept: FAMILY MEDICINE | Facility: CLINIC | Age: 73
End: 2018-05-11
Payer: MEDICARE

## 2018-05-11 ENCOUNTER — OFFICE VISIT (OUTPATIENT)
Dept: OPTOMETRY | Facility: CLINIC | Age: 73
End: 2018-05-11
Payer: MEDICARE

## 2018-05-11 VITALS — SYSTOLIC BLOOD PRESSURE: 132 MMHG | DIASTOLIC BLOOD PRESSURE: 78 MMHG | HEART RATE: 67 BPM | OXYGEN SATURATION: 99 %

## 2018-05-11 DIAGNOSIS — E11.9 DIABETES MELLITUS TYPE 2 WITHOUT RETINOPATHY: Primary | ICD-10-CM

## 2018-05-11 DIAGNOSIS — H25.13 NUCLEAR SCLEROSIS OF BOTH EYES: ICD-10-CM

## 2018-05-11 DIAGNOSIS — H52.7 REFRACTIVE ERRORS: ICD-10-CM

## 2018-05-11 DIAGNOSIS — I10 ESSENTIAL HYPERTENSION: Primary | ICD-10-CM

## 2018-05-11 DIAGNOSIS — I69.30 H/O: STROKE WITH RESIDUAL EFFECTS: ICD-10-CM

## 2018-05-11 PROCEDURE — 99999 PR PBB SHADOW E&M-EST. PATIENT-LVL III: CPT | Mod: PBBFAC,,,

## 2018-05-11 PROCEDURE — 92014 COMPRE OPH EXAM EST PT 1/>: CPT | Mod: S$GLB,,, | Performed by: OPTOMETRIST

## 2018-05-11 PROCEDURE — 92015 DETERMINE REFRACTIVE STATE: CPT | Mod: S$GLB,,, | Performed by: OPTOMETRIST

## 2018-05-11 PROCEDURE — 99499 UNLISTED E&M SERVICE: CPT | Mod: S$GLB,,, | Performed by: INTERNAL MEDICINE

## 2018-05-11 PROCEDURE — 99499 UNLISTED E&M SERVICE: CPT | Mod: S$GLB,,, | Performed by: OPTOMETRIST

## 2018-05-11 PROCEDURE — 99999 PR PBB SHADOW E&M-EST. PATIENT-LVL II: CPT | Mod: PBBFAC,,, | Performed by: OPTOMETRIST

## 2018-05-11 NOTE — PROGRESS NOTES
Ramya Mayo 72 y.o. female is here today for Blood Pressure check.   History of HTN yes.    Review of patient's allergies indicates:   Allergen Reactions    Lipitor [atorvastatin] Other (See Comments)     STROKE      Citalopram analogues Other (See Comments)     Hallucinations     Creatinine   Date Value Ref Range Status   04/13/2018 0.8 0.5 - 1.4 mg/dL Final     Sodium   Date Value Ref Range Status   04/13/2018 139 136 - 145 mmol/L Final     Potassium   Date Value Ref Range Status   04/13/2018 4.1 3.5 - 5.1 mmol/L Final   ]  Patient verifies taking blood pressure medications on a regular basis at the same time of the day.     Current Outpatient Prescriptions:     amLODIPine (NORVASC) 10 MG tablet, Take 1 tablet (10 mg total) by mouth once daily., Disp: 90 tablet, Rfl: 3    antiox #11-om3-dha-epa-lut-gold (OCUVITE ADULT 50+) 250-5-1 mg Cap, , Disp: , Rfl:     aspirin 325 MG tablet, Take 325 mg by mouth. 1 Tablet Oral Every day, Disp: , Rfl:     BLOOD SUGAR DIAGNOSTIC (TRUE METRIX GLUCOSE TEST STRIP MISC), 1 strip by Misc.(Non-Drug; Combo Route) route 3 (three) times daily., Disp: , Rfl:     glyBURIDE (DIABETA) 2.5 MG tablet, Take 1 tablet (2.5 mg total) by mouth once daily., Disp: 90 tablet, Rfl: 3    losartan (COZAAR) 100 MG tablet, Take 1 tablet (100 mg total) by mouth once daily., Disp: 90 tablet, Rfl: 1    metFORMIN (GLUCOPHAGE) 500 MG tablet, Take 2 tablets (1,000 mg total) by mouth 2 (two) times daily with meals., Disp: 360 tablet, Rfl: 3    metoprolol succinate (TOPROL-XL) 100 MG 24 hr tablet, Take 1 tablet (100 mg total) by mouth once daily., Disp: 90 tablet, Rfl: 3    multivit & mins-ferrous glucon (CENTRUM) 9 mg/15 mL iron Liqd, Take by mouth. 1 Liquid Oral Every day, Disp: , Rfl:     pravastatin (PRAVACHOL) 80 MG tablet, take 1 TABLET(S) BY MOUTH AT BEDTIME, Disp: 90 tablet, Rfl: 3    rOPINIRole (REQUIP) 1 MG tablet, Take 1 tablet (1 mg total) by mouth 3 (three) times daily., Disp:  180 tablet, Rfl: 3    traZODone (DESYREL) 50 MG tablet, 1/2 to 1 tablet, 1 hour prior to bedtime, as needed for sleep, Disp: 30 tablet, Rfl: 11    triamcinolone acetonide 0.1% (KENALOG) 0.1 % cream, Apply topically 2 (two) times daily., Disp: 45 g, Rfl: 5  Does patient have record of home blood pressure readings no.    Last dose of blood pressure medication was taken at approx 9 am.  Patient is asymptomatic.   Complains of none.    Vitals:    05/11/18 1143   BP: 132/78   BP Location: Right arm   Patient Position: Sitting   BP Method: Medium (Manual)   Pulse: 67   SpO2: 99%         Dr. Donato notified.

## 2018-05-11 NOTE — PROGRESS NOTES
Subjective:       Patient ID: Ramya Mayo is a 72 y.o. female      Chief Complaint   Patient presents with    Concerns About Ocular Health    Hypertensive Eye Exam    Diabetic Eye Exam     LBS: 144 x 1 day      History of Present Illness  Dls: 9/20/16 Dr. Baltazar    Pt here for diabetic and hypertensive eye exam.   Pt  States no changes in vision. Pt wears pal's.   Pt states no tearing no itching no burning no pain no ha's no floaters.     Eye meds:  None    Hemoglobin A1C       Date                     Value               Ref Range           Status                04/13/2018               7.8 (H)             4.0 - 5.6 %         Final                 03/13/2017               7.6 (H)             4.5 - 6.2 %         Final                  08/16/2016               7.5 (H)             4.5 - 6.2 %         Final             ----------        Assessment/Plan:     1. Diabetes mellitus type 2 without retinopathy  No diabetic retinopathy. Discussed with pt the effects of diabetes on vision, importance of good blood sugar control, compliance with meds, and follow up care with PCP. Return in 1 year for dilated eye exam, sooner PRN.    2. H/O: stroke with residual effects  Field loss with decreased VA OD. Stable.    3. Nuclear sclerosis of both eyes  Educated pt on presence of cataracts and effects on vision. No surgery at this time. Recheck in one year.    4. Refractive errors  Educated patient on refractive error and discussed lens options. Dispensed updated spectacle Rx. Educated about adaptation period to new specs.    Eyeglass Final Rx     Eyeglass Final Rx       Sphere Cylinder Axis Add    Right Balance   +2.75    Left -3.00 +1.25 005 +2.75    Expiration Date:  5/12/2019                Follow-up in about 1 year (around 5/11/2019) for Diabetic Eye Exam.

## 2018-05-11 NOTE — LETTER
May 11, 2018      Kameron Donato MD  [address]  [place] [id number]           [facility]  [address] [place] [address]  Phone: [phone]  Fax: [phone]          Patient: Ramya Mayo   MR Number: [id number]   YOB: 1945   Date of Visit: 5/11/2018       Dear Dr. Kameron Donato:    Thank you for referring Ramya Mayo to me for evaluation. Attached you will find relevant portions of my assessment and plan of care.    If you have questions, please do not hesitate to call me. I look forward to following Ramya Mayo along with you.    Sincerely,    Tessa Baltazar, OD    Enclosure  CC:  No Recipients    If you would like to receive this communication electronically, please contact [email or web address] or [phone] to request more information on [employer] Link access.    For providers and/or their staff who would like to refer a patient to [facility], please contact us through our one-stop-shop provider referral line, [facility], at [phone].    If you feel you have received this communication in error or would no longer like to receive these types of communications, please e-mail [email or web address]

## 2018-05-22 ENCOUNTER — OFFICE VISIT (OUTPATIENT)
Dept: FAMILY MEDICINE | Facility: CLINIC | Age: 73
End: 2018-05-22
Payer: MEDICARE

## 2018-05-22 VITALS
WEIGHT: 153.75 LBS | SYSTOLIC BLOOD PRESSURE: 154 MMHG | DIASTOLIC BLOOD PRESSURE: 84 MMHG | BODY MASS INDEX: 27.24 KG/M2 | OXYGEN SATURATION: 98 % | TEMPERATURE: 98 F | HEIGHT: 63 IN | HEART RATE: 90 BPM

## 2018-05-22 DIAGNOSIS — I10 WHITE COAT SYNDROME WITH HYPERTENSION: ICD-10-CM

## 2018-05-22 DIAGNOSIS — I10 ESSENTIAL HYPERTENSION: ICD-10-CM

## 2018-05-22 DIAGNOSIS — M75.82 TENDONITIS OF LEFT ROTATOR CUFF: Primary | ICD-10-CM

## 2018-05-22 DIAGNOSIS — R01.1 SYSTOLIC MURMUR: ICD-10-CM

## 2018-05-22 PROCEDURE — 99999 PR PBB SHADOW E&M-EST. PATIENT-LVL III: CPT | Mod: PBBFAC,,, | Performed by: INTERNAL MEDICINE

## 2018-05-22 PROCEDURE — 3079F DIAST BP 80-89 MM HG: CPT | Mod: CPTII,S$GLB,, | Performed by: INTERNAL MEDICINE

## 2018-05-22 PROCEDURE — 99214 OFFICE O/P EST MOD 30 MIN: CPT | Mod: S$GLB,,, | Performed by: INTERNAL MEDICINE

## 2018-05-22 PROCEDURE — 3077F SYST BP >= 140 MM HG: CPT | Mod: CPTII,S$GLB,, | Performed by: INTERNAL MEDICINE

## 2018-05-22 RX ORDER — CYCLOBENZAPRINE HCL 5 MG
5 TABLET ORAL NIGHTLY
Qty: 10 TABLET | Refills: 1 | Status: SHIPPED | OUTPATIENT
Start: 2018-05-22 | End: 2018-06-01

## 2018-05-22 NOTE — PROGRESS NOTES
This note was created by combination of typed  and Dragon dictation.  Transcription errors may be present.  If there are any questions, please contact me.    Assessment / Plan:   Tendonitis of left rotator cuff - home physical therapy handout provided.  Cold packs.  Avoid NSAIDs as her blood pressure is elevated.  Trial of Flexeril for nighttime use.  -     cyclobenzaprine (FLEXERIL) 5 MG tablet; Take 1 tablet (5 mg total) by mouth nightly.  Dispense: 10 tablet; Refill: 1    White coat syndrome with hypertension  Essential hypertension-not quite to goal today, she thinks there is a component of white coat syndrome.  All have her return in 2 weeks for nurse visit for BP check.  On higher dosed ARB.    Systolic murmur-recalls having had workup years ago.  No chest pain, dizziness, presyncope, shortness of breath, pedal edema.  Check echo.  -     Echo 2d complete; Future    There are no discontinued medications.  Modified Medications    No medications on file     New Prescriptions    CYCLOBENZAPRINE (FLEXERIL) 5 MG TABLET    Take 1 tablet (5 mg total) by mouth nightly.         Follow Up: No Follow-up on file.      Subjective:     Chief Complaint   Patient presents with    Arm Pain       HPI  Ramya is a 72 y.o. female, last appointment with this clinic was 5/11/2018.    No LMP recorded. Patient has had a hysterectomy.    Diabetes type 2 without retinopathy  Hypertension  Hyperlipidemia; 4/2018 prava to atorva  History of CVA  9/24/2012 DEXA scan normal  Restless leg syndrome, ropinirole    Some white coat syndrome component.  High stress - legal issues. Brother with [place] on home hospice.    Last seen 4/13/18; plan was increase losartan to 100.   Plan was avoid BZD. Trial trazodone. If no help - remeron.   a1c 7.8 on metformin increased dose.  Lipid high changed prava to atorva  BP check 5/11 BP was good.    Comes in today complaining of pain in her left arm, left shoulder, left scapula area for the past  week.  Unsure what the trigger was.  Reminiscent of a previous episode maybe 1 year ago which resolved spontaneously.  The pain seems to be the worst in the scapula and will radiate down her left arm.  No chest pain, shortness of breath, palpitations.  Has been taking over-the-counter NSAIDs as well as over-the-counter Tylenol and topical salves without relief.  No weakness of .    Recalls being told that she had a murmur back in [place].  Recalls a history of echo and does not know the results of those.  Denies dizziness, chest pain, shortness of breath, presyncope.    Blood pressure high today on intake, still high on repeat.  It was good almost 2 weeks ago when she came in for a nurse visit.  She thinks that there may be a component of white coat syndrome.    Patient Care Team:  Kameron Donato MD as PCP - General (Internal Medicine)    Patient Active Problem List    Diagnosis Date Noted    Nuclear sclerosis of both eyes 2018    Refractive errors 2018    Diabetes mellitus type 2 without retinopathy 2016    Mixed hyperlipidemia 2012    Essential hypertension 2012    Diabetes type 2, uncontrolled 2012    H/O: stroke with residual effects 2012       PAST MEDICAL HISTORY:  Past Medical History:   Diagnosis Date    Diabetes mellitus type II     Hyperlipidemia     Hypertension     Nuclear sclerosis of both eyes 2018    Stroke        PAST SURGICAL HISTORY:  Past Surgical History:   Procedure Laterality Date    4 MISCARRIAGES       8 PARA 4      HYSTERECTOMY      laser right eye  Right ? yrs    pt had stroke and tried to bring vision back        SOCIAL HISTORY:  Social History     Social History    Marital status:      Spouse name: N/A    Number of children: N/A    Years of education: N/A     Occupational History    Not on file.     Social History Main Topics    Smoking status: Never Smoker    Smokeless tobacco: Never Used     Alcohol use No    Drug use: No    Sexual activity: Not on file     Other Topics Concern    Not on file     Social History Narrative    Lives in [place].    2014.         ALLERGIES AND MEDICATIONS: updated and reviewed.  Review of patient's allergies indicates:   Allergen Reactions    Lipitor [atorvastatin] Other (See Comments)     STROKE      Citalopram analogues Other (See Comments)     Hallucinations     Current Outpatient Prescriptions   Medication Sig Dispense Refill    amLODIPine (NORVASC) 10 MG tablet Take 1 tablet (10 mg total) by mouth once daily. 90 tablet 3    antiox #11-om3-dha-epa-lut-gold (OCUVITE ADULT 50+) 250-5-1 mg Cap       aspirin 325 MG tablet Take 325 mg by mouth. 1 Tablet Oral Every day      BLOOD SUGAR DIAGNOSTIC (TRUE METRIX GLUCOSE TEST STRIP MISC) 1 strip by Misc.(Non-Drug; Combo Route) route 3 (three) times daily.      glyBURIDE (DIABETA) 2.5 MG tablet Take 1 tablet (2.5 mg total) by mouth once daily. 90 tablet 3    losartan (COZAAR) 100 MG tablet Take 1 tablet (100 mg total) by mouth once daily. 90 tablet 1    metFORMIN (GLUCOPHAGE) 500 MG tablet Take 2 tablets (1,000 mg total) by mouth 2 (two) times daily with meals. 360 tablet 3    metoprolol succinate (TOPROL-XL) 100 MG 24 hr tablet Take 1 tablet (100 mg total) by mouth once daily. 90 tablet 3    multivit & mins-ferrous glucon (CENTRUM) 9 mg/15 mL iron Liqd Take by mouth. 1 Liquid Oral Every day      pravastatin (PRAVACHOL) 80 MG tablet take 1 TABLET(S) BY MOUTH AT BEDTIME 90 tablet 3    rOPINIRole (REQUIP) 1 MG tablet Take 1 tablet (1 mg total) by mouth 3 (three) times daily. 180 tablet 3    traZODone (DESYREL) 50 MG tablet 1/2 to 1 tablet, 1 hour prior to bedtime, as needed for sleep 30 tablet 11    triamcinolone acetonide 0.1% (KENALOG) 0.1 % cream Apply topically 2 (two) times daily. 45 g 5     No current facility-administered medications for this visit.        Review of Systems   All other  "systems reviewed and are negative.      Objective:   Physical Exam   Vitals:    05/22/18 1558   BP: (!) 174/78   Pulse: 90   Temp: 98 °F (36.7 °C)   SpO2: 98%   Weight: 69.7 kg (153 lb 12.3 oz)   Height: 5' 3" (1.6 m)    Body mass index is 27.24 kg/m².  Weight: 69.7 kg (153 lb 12.3 oz)   Height: 5' 3" (160 cm)     Physical Exam   Constitutional: She is oriented to person, place, and time. She appears well-developed and well-nourished. No distress.   Eyes: EOM are normal.   Cardiovascular: Normal rate and regular rhythm.    No murmur heard.  3/6 systolic murmur best heard RUSB, radiating into the clavicle, minimal radiation into the carotids.  But it sounds holosystolic.   Pulmonary/Chest: Effort normal and breath sounds normal.   Musculoskeletal:   The left shoulder is unremarkable without tenderness on palpation, the acromioclavicular joint is unremarkable.  External rotation elicits discomfort.  Internal rotation without pain. Drop arm test negative.   5/5.  Biceps tendon unremarkable without deformity.  C-spine and T-spine unremarkable without tenderness.   Neurological: She is alert and oriented to person, place, and time. Coordination normal.   Skin: Skin is warm and dry.   Psychiatric: She has a normal mood and affect. Her behavior is normal. Thought content normal.     "

## 2018-06-06 ENCOUNTER — HOSPITAL ENCOUNTER (OUTPATIENT)
Dept: CARDIOLOGY | Facility: HOSPITAL | Age: 73
Discharge: HOME | End: 2018-06-06
Attending: INTERNAL MEDICINE
Payer: MEDICARE

## 2018-06-06 ENCOUNTER — CLINICAL SUPPORT (OUTPATIENT)
Dept: FAMILY MEDICINE | Facility: CLINIC | Age: 73
End: 2018-06-06
Payer: MEDICARE

## 2018-06-06 VITALS — HEART RATE: 85 BPM | OXYGEN SATURATION: 98 % | DIASTOLIC BLOOD PRESSURE: 76 MMHG | SYSTOLIC BLOOD PRESSURE: 140 MMHG

## 2018-06-06 DIAGNOSIS — I10 ESSENTIAL HYPERTENSION: Primary | ICD-10-CM

## 2018-06-06 DIAGNOSIS — R01.1 SYSTOLIC MURMUR: ICD-10-CM

## 2018-06-06 LAB
AORTIC VALVE STENOSIS: NORMAL
DIASTOLIC DYSFUNCTION: NO
ESTIMATED PA SYSTOLIC PRESSURE: 24.72
MITRAL VALVE REGURGITATION: NORMAL
RETIRED EF AND QEF - SEE NOTES: 75 (ref 55–65)
TRICUSPID VALVE REGURGITATION: NORMAL

## 2018-06-06 PROCEDURE — 99999 PR PBB SHADOW E&M-EST. PATIENT-LVL II: CPT | Mod: PBBFAC,,,

## 2018-06-06 PROCEDURE — 93306 TTE W/DOPPLER COMPLETE: CPT

## 2018-06-06 PROCEDURE — 99499 UNLISTED E&M SERVICE: CPT | Mod: S$GLB,,, | Performed by: INTERNAL MEDICINE

## 2018-06-06 PROCEDURE — 93306 TTE W/DOPPLER COMPLETE: CPT | Mod: 26,,, | Performed by: INTERNAL MEDICINE

## 2018-06-06 NOTE — PROGRESS NOTES
Ramya Mayo 72 y.o. female is here today for Blood Pressure check.   History of HTN yes.    Review of patient's allergies indicates:   Allergen Reactions    Lipitor [atorvastatin] Other (See Comments)     STROKE      Citalopram analogues Other (See Comments)     Hallucinations     Creatinine   Date Value Ref Range Status   04/13/2018 0.8 0.5 - 1.4 mg/dL Final     Sodium   Date Value Ref Range Status   04/13/2018 139 136 - 145 mmol/L Final     Potassium   Date Value Ref Range Status   04/13/2018 4.1 3.5 - 5.1 mmol/L Final   ]  Patient verifies taking blood pressure medications on a regular basis at the same time of the day.     Current Outpatient Prescriptions:     amLODIPine (NORVASC) 10 MG tablet, Take 1 tablet (10 mg total) by mouth once daily., Disp: 90 tablet, Rfl: 3    antiox #[id number]-dha-epa-lut-gold (OCUVITE ADULT 50+) 250-5-1 mg Cap, , Disp: , Rfl:     aspirin 325 MG tablet, Take 325 mg by mouth. 1 Tablet Oral Every day, Disp: , Rfl:     BLOOD SUGAR DIAGNOSTIC (TRUE METRIX GLUCOSE TEST STRIP MISC), 1 strip by Misc.(Non-Drug; Combo Route) route 3 (three) times daily., Disp: , Rfl:     glyBURIDE (DIABETA) 2.5 MG tablet, Take 1 tablet (2.5 mg total) by mouth once daily., Disp: 90 tablet, Rfl: 3    losartan (COZAAR) 100 MG tablet, Take 1 tablet (100 mg total) by mouth once daily., Disp: 90 tablet, Rfl: 1    metFORMIN (GLUCOPHAGE) 500 MG tablet, Take 2 tablets (1,000 mg total) by mouth 2 (two) times daily with meals., Disp: 360 tablet, Rfl: 3    metoprolol succinate (TOPROL-XL) 100 MG 24 hr tablet, Take 1 tablet (100 mg total) by mouth once daily., Disp: 90 tablet, Rfl: 3    multivit & mins-ferrous glucon (CENTRUM) 9 mg/15 mL iron Liqd, Take by mouth. 1 Liquid Oral Every day, Disp: , Rfl:     pravastatin (PRAVACHOL) 80 MG tablet, take 1 TABLET(S) BY MOUTH AT BEDTIME, Disp: 90 tablet, Rfl: 3    rOPINIRole (REQUIP) 1 MG tablet, Take 1 tablet (1 mg total) by mouth 3 (three) times daily., Disp:  180 tablet, Rfl: 3    traZODone (DESYREL) 50 MG tablet, 1/2 to 1 tablet, 1 hour prior to bedtime, as needed for sleep, Disp: 30 tablet, Rfl: 11    triamcinolone acetonide 0.1% (KENALOG) 0.1 % cream, Apply topically 2 (two) times daily., Disp: 45 g, Rfl: 5  Does patient have record of home blood pressure readings yes. Readings have been averaging 160's/80's.   Last dose of blood pressure medication was taken at approx 9 am this morning.  Patient is asymptomatic.   Complains of none.    Vitals:    06/06/18 1530   BP: (!) 140/76   BP Location: Right arm   Patient Position: Sitting   BP Method: Medium (Manual)   Pulse: 85   SpO2: 98%         Dr. Donato notified.

## 2018-06-06 NOTE — PROGRESS NOTES
Echo trivial to mild AoS.  Hyperdynamic LVEF 70%. Hx of HTN. Diastolic function normal.  Done for murmur.  So seems to be benign flow murmur.    Results to pt.  No further tests.

## 2018-07-03 NOTE — PROGRESS NOTES
This note was created by combination of typed  and Dragon dictation.  Transcription errors may be present.  If there are any questions, please contact me.    Assessment / Plan:   Uncontrolled type 2 diabetes mellitus without complication, without long-term current use of insulin  Diabetes mellitus type 2 without retinopathy  -check A1c today.  On metformin 1000 b.i.d., glyburide 2.5 daily.  On ARB.  On aspirin.  -     Hemoglobin A1c; Future; Expected date: 07/05/2018  -     Ferritin; Future; Expected date: 07/05/2018    Essential hypertension-probable component of white coat syndrome.  Has a home cuff, home brachial cuff but never verified accuracy.  Nurse visit for BP check and bring in the cuff to verify accuracy.  Otherwise no change, on max dose amlodipine, losartan, and mid range dose of metoprolol.  For now no change.  -     amLODIPine (NORVASC) 10 MG tablet; Take 1 tablet (10 mg total) by mouth once daily.  Dispense: 90 tablet; Refill: 3  -     losartan (COZAAR) 100 MG tablet; Take 1 tablet (100 mg total) by mouth once daily.  Dispense: 90 tablet; Refill: 3  -     metoprolol succinate (TOPROL-XL) 100 MG 24 hr tablet; Take 1 tablet (100 mg total) by mouth once daily.  Dispense: 90 tablet; Refill: 3    H/O: stroke with residual effects  Mixed hyperlipidemia-check lipid profile on higher dose pravastatin.  Previous check I believe was on pravastatin 10.  On aspirin.  -     Lipid panel; Future; Expected date: 07/05/2018  -     pravastatin (PRAVACHOL) 80 MG tablet; take 1 TABLET(S) BY MOUTH AT BEDTIME  Dispense: 90 tablet; Refill: 3  -     Comprehensive metabolic panel; Future; Expected date: 07/05/2018    Adjustment disorder, unspecified type  Restless legs syndrome  RLS (restless legs syndrome) - taking ropinirole, check CBC and ferritin, ferritin goal > 50  -     CBC auto differential; Future; Expected date: 07/05/2018  -     Ferritin; Future; Expected date: 07/05/2018  -     traZODone (DESYREL) 50 MG  tablet; 1/2 to 1 tablet, 1 hour prior to bedtime, as needed for sleep  Dispense: 30 tablet; Refill: 11  Is stable on the med-     rOPINIRole (REQUIP) 1 MG tablet; Take 1 tablet (1 mg total) by mouth 3 (three) times daily.  Dispense: 180 tablet; Refill: 3    Cervical radiculopathy - she is requesting flexeril as it worked for her shoulder previously. rx sent.  Home PT handout provided.  -     cyclobenzaprine (FLEXERIL) 5 MG tablet; Take 1 tablet (5 mg total) by mouth nightly.  Dispense: 30 tablet; Refill: 0    She declines mammo    Medications Discontinued During This Encounter   Medication Reason    metFORMIN (GLUCOPHAGE) 500 MG tablet Reorder    amLODIPine (NORVASC) 10 MG tablet Reorder    glyBURIDE (DIABETA) 2.5 MG tablet Reorder    losartan (COZAAR) 100 MG tablet Reorder    traZODone (DESYREL) 50 MG tablet Reorder    metoprolol succinate (TOPROL-XL) 100 MG 24 hr tablet Reorder    rOPINIRole (REQUIP) 1 MG tablet Reorder    pravastatin (PRAVACHOL) 80 MG tablet Reorder     Modified Medications    Modified Medication Previous Medication    AMLODIPINE (NORVASC) 10 MG TABLET amLODIPine (NORVASC) 10 MG tablet       Take 1 tablet (10 mg total) by mouth once daily.    Take 1 tablet (10 mg total) by mouth once daily.    GLYBURIDE (DIABETA) 2.5 MG TABLET glyBURIDE (DIABETA) 2.5 MG tablet       Take 1 tablet (2.5 mg total) by mouth once daily.    Take 1 tablet (2.5 mg total) by mouth once daily.    LOSARTAN (COZAAR) 100 MG TABLET losartan (COZAAR) 100 MG tablet       Take 1 tablet (100 mg total) by mouth once daily.    Take 1 tablet (100 mg total) by mouth once daily.    METFORMIN (GLUCOPHAGE) 1000 MG TABLET metFORMIN (GLUCOPHAGE) 500 MG tablet       Take 1 tablet (1,000 mg total) by mouth 2 (two) times daily with meals.    Take 2 tablets (1,000 mg total) by mouth 2 (two) times daily with meals.    METOPROLOL SUCCINATE (TOPROL-XL) 100 MG 24 HR TABLET metoprolol succinate (TOPROL-XL) 100 MG 24 hr tablet       Take 1  tablet (100 mg total) by mouth once daily.    Take 1 tablet (100 mg total) by mouth once daily.    PRAVASTATIN (PRAVACHOL) 80 MG TABLET pravastatin (PRAVACHOL) 80 MG tablet       take 1 TABLET(S) BY MOUTH AT BEDTIME    take 1 TABLET(S) BY MOUTH AT BEDTIME    ROPINIROLE (REQUIP) 1 MG TABLET rOPINIRole (REQUIP) 1 MG tablet       Take 1 tablet (1 mg total) by mouth 3 (three) times daily.    Take 1 tablet (1 mg total) by mouth 3 (three) times daily.    TRAZODONE (DESYREL) 50 MG TABLET traZODone (DESYREL) 50 MG tablet       1/2 to 1 tablet, 1 hour prior to bedtime, as needed for sleep    1/2 to 1 tablet, 1 hour prior to bedtime, as needed for sleep     New Prescriptions    CYCLOBENZAPRINE (FLEXERIL) 5 MG TABLET    Take 1 tablet (5 mg total) by mouth nightly.         Follow Up: No Follow-up on file. plan for f/u 6 months.      Subjective:     Chief Complaint   Patient presents with    Diabetes       HPI  Ramya is a 72 y.o. female, last appointment with this clinic was 6/6/2018.    No LMP recorded. Patient has had a hysterectomy.    Diabetes type 2 without retinopathy  Hypertension  Hyperlipidemia; 4/2018 prava to atorva  History of CVA  9/24/2012 DEXA scan normal  Restless leg syndrome, ropinirole  Benign flow murmur, TTE 6/2018 normal     Some white coat syndrome component.  High stress - legal issues. Brother with [facility] on home hospice.     increased losartan to 100.   Plan was avoid BZD. Trial trazodone. If no help - remeron.   a1c 7.8 on metformin increased dose. Currently taking 1000 BID.  Lipid high changed prava to atorva but she has been taking high dose prava not atorva.  Previous listed that atorva caused stroke but I think that is unlikely - delisted that as an allergy.   BP check 5/11 BP was good.    Last seen w rotator tendonitis  White coat BP borderline with nurse visit.    Notes intermittent numbness/tingling of left arm.  Comes and goes.  During daytime. Occurs at rest.  Can be with lying down. I had  seen her last time for what I felt was left rotator tendonitis.  She denies neck pain. This is not quite the same as previous rotator complaint.    She had found flexeril helpful for the shoulder.  She would like a refill for the current symptoms.     Turns out she is taking pravastatin 80 mg.  Not the atorvastatin.  Labs done in April was on low dose pravastatin.     Notes fatigue and low appetite. No fever no chills no chest pain.      Home BP checks by recall systolic 130s. Never verified home cuff.     She declines mammo    She is taking metformin 1000 BID.    Patient Care Team:  Kameron Donato MD as PCP - General (Internal Medicine)    Patient Active Problem List    Diagnosis Date Noted    RLS (restless legs syndrome) 2018    Flow murmur, 2018 TTE WNL 2018    Nuclear sclerosis of both eyes 2018    Refractive errors 2018    Diabetes mellitus type 2 without retinopathy 2016    Mixed hyperlipidemia 2012    Essential hypertension 2012    Diabetes type 2, uncontrolled 2012    H/O: stroke with residual effects 2012       PAST MEDICAL HISTORY:  Past Medical History:   Diagnosis Date    Diabetes mellitus type II     Hyperlipidemia     Hypertension     Nuclear sclerosis of both eyes 2018    Stroke        PAST SURGICAL HISTORY:  Past Surgical History:   Procedure Laterality Date    4 MISCARRIAGES       8 PARA 4      HYSTERECTOMY      laser right eye  Right ? yrs    pt had stroke and tried to bring vision back        SOCIAL HISTORY:  Social History     Social History    Marital status:      Spouse name: N/A    Number of children: N/A    Years of education: N/A     Occupational History    Not on file.     Social History Main Topics    Smoking status: Never Smoker    Smokeless tobacco: Never Used    Alcohol use No    Drug use: No    Sexual activity: Not on file     Other Topics Concern    Not on file     Social History  Narrative    Lives in [place].    2014.         ALLERGIES AND MEDICATIONS: updated and reviewed.  Review of patient's allergies indicates:   Allergen Reactions    Lipitor [atorvastatin] Other (See Comments)     STROKE      Citalopram analogues Other (See Comments)     Hallucinations     Current Outpatient Prescriptions   Medication Sig Dispense Refill    amLODIPine (NORVASC) 10 MG tablet Take 1 tablet (10 mg total) by mouth once daily. 90 tablet 3    antiox #[id number]-dha-epa-lut-gold (OCUVITE ADULT 50+) 250-5-1 mg Cap       aspirin 325 MG tablet Take 325 mg by mouth. 1 Tablet Oral Every day      BLOOD SUGAR DIAGNOSTIC (TRUE METRIX GLUCOSE TEST STRIP MISC) 1 strip by Misc.(Non-Drug; Combo Route) route 3 (three) times daily.      glyBURIDE (DIABETA) 2.5 MG tablet Take 1 tablet (2.5 mg total) by mouth once daily. 90 tablet 3    losartan (COZAAR) 100 MG tablet Take 1 tablet (100 mg total) by mouth once daily. 90 tablet 1    metFORMIN (GLUCOPHAGE) 500 MG tablet Take 2 tablets (1,000 mg total) by mouth 2 (two) times daily with meals. 360 tablet 3    metoprolol succinate (TOPROL-XL) 100 MG 24 hr tablet Take 1 tablet (100 mg total) by mouth once daily. 90 tablet 3    multivit & mins-ferrous glucon (CENTRUM) 9 mg/15 mL iron Liqd Take by mouth. 1 Liquid Oral Every day      pravastatin (PRAVACHOL) 80 MG tablet take 1 TABLET(S) BY MOUTH AT BEDTIME 90 tablet 3    rOPINIRole (REQUIP) 1 MG tablet Take 1 tablet (1 mg total) by mouth 3 (three) times daily. 180 tablet 3    traZODone (DESYREL) 50 MG tablet 1/2 to 1 tablet, 1 hour prior to bedtime, as needed for sleep 30 tablet 11    triamcinolone acetonide 0.1% (KENALOG) 0.1 % cream Apply topically 2 (two) times daily. 45 g 5     No current facility-administered medications for this visit.        Review of Systems   All other systems reviewed and are negative.      Objective:   Physical Exam   Vitals:    18 0924   BP: (!) 166/80   Pulse: 80  "  Temp: 98.3 °F (36.8 °C)   SpO2: 97%   Weight: 69.2 kg (152 lb 8.9 oz)   Height: 5' 3" (1.6 m)    Body mass index is 27.02 kg/m².  Weight: 69.2 kg (152 lb 8.9 oz)   Height: 5' 3" (160 cm)     Physical Exam   Constitutional: She is oriented to person, place, and time. She appears well-developed and well-nourished. No distress.   Eyes: EOM are normal.   Cardiovascular: Normal rate, regular rhythm and normal heart sounds.    No murmur heard.  Pulmonary/Chest: Effort normal and breath sounds normal.   Musculoskeletal: Normal range of motion.   The left arm,  5/5, biceps strength 5/5 without deformity.  External rotation against resistance unremarkable, internal rotation against resistance unremarkable.  With neck hyperextension, as gets some pain in the neck area around the level of C7, more on the left side.  Does not radiate.   Neurological: She is alert and oriented to person, place, and time. Coordination normal.   Skin: Skin is warm and dry.   Psychiatric: She has a normal mood and affect. Her behavior is normal. Thought content normal.     "

## 2018-07-05 ENCOUNTER — OFFICE VISIT (OUTPATIENT)
Dept: FAMILY MEDICINE | Facility: CLINIC | Age: 73
End: 2018-07-05
Payer: MEDICARE

## 2018-07-05 ENCOUNTER — LAB VISIT (OUTPATIENT)
Dept: LAB | Facility: HOSPITAL | Age: 73
End: 2018-07-05
Attending: INTERNAL MEDICINE
Payer: MEDICARE

## 2018-07-05 VITALS
TEMPERATURE: 98 F | BODY MASS INDEX: 27.03 KG/M2 | HEIGHT: 63 IN | DIASTOLIC BLOOD PRESSURE: 80 MMHG | HEART RATE: 80 BPM | OXYGEN SATURATION: 97 % | WEIGHT: 152.56 LBS | SYSTOLIC BLOOD PRESSURE: 166 MMHG

## 2018-07-05 DIAGNOSIS — E78.2 MIXED HYPERLIPIDEMIA: ICD-10-CM

## 2018-07-05 DIAGNOSIS — G25.81 RESTLESS LEGS SYNDROME: ICD-10-CM

## 2018-07-05 DIAGNOSIS — F43.20 ADJUSTMENT DISORDER, UNSPECIFIED TYPE: ICD-10-CM

## 2018-07-05 DIAGNOSIS — I69.30 H/O: STROKE WITH RESIDUAL EFFECTS: ICD-10-CM

## 2018-07-05 DIAGNOSIS — M54.12 CERVICAL RADICULOPATHY: ICD-10-CM

## 2018-07-05 DIAGNOSIS — E11.9 DIABETES MELLITUS TYPE 2 WITHOUT RETINOPATHY: ICD-10-CM

## 2018-07-05 DIAGNOSIS — G25.81 RLS (RESTLESS LEGS SYNDROME): ICD-10-CM

## 2018-07-05 DIAGNOSIS — I10 ESSENTIAL HYPERTENSION: ICD-10-CM

## 2018-07-05 LAB
ALBUMIN SERPL BCP-MCNC: 4.1 G/DL
ALP SERPL-CCNC: 98 U/L
ALT SERPL W/O P-5'-P-CCNC: 33 U/L
ANION GAP SERPL CALC-SCNC: 9 MMOL/L
AST SERPL-CCNC: 33 U/L
BASOPHILS # BLD AUTO: 0.07 K/UL
BASOPHILS NFR BLD: 0.9 %
BILIRUB SERPL-MCNC: 1 MG/DL
BUN SERPL-MCNC: 12 MG/DL
CALCIUM SERPL-MCNC: 10.5 MG/DL
CHLORIDE SERPL-SCNC: 104 MMOL/L
CHOLEST SERPL-MCNC: 200 MG/DL
CHOLEST/HDLC SERPL: 3.7 {RATIO}
CO2 SERPL-SCNC: 27 MMOL/L
CREAT SERPL-MCNC: 0.9 MG/DL
DIFFERENTIAL METHOD: ABNORMAL
EOSINOPHIL # BLD AUTO: 0.2 K/UL
EOSINOPHIL NFR BLD: 3 %
ERYTHROCYTE [DISTWIDTH] IN BLOOD BY AUTOMATED COUNT: 14.8 %
EST. GFR  (AFRICAN AMERICAN): >60 ML/MIN/1.73 M^2
EST. GFR  (NON AFRICAN AMERICAN): >60 ML/MIN/1.73 M^2
ESTIMATED AVG GLUCOSE: 154 MG/DL
FERRITIN SERPL-MCNC: 90 NG/ML
GLUCOSE SERPL-MCNC: 179 MG/DL
HBA1C MFR BLD HPLC: 7 %
HCT VFR BLD AUTO: 43.4 %
HDLC SERPL-MCNC: 54 MG/DL
HDLC SERPL: 27 %
HGB BLD-MCNC: 12.3 G/DL
IMM GRANULOCYTES # BLD AUTO: 0.01 K/UL
IMM GRANULOCYTES NFR BLD AUTO: 0.1 %
LDLC SERPL CALC-MCNC: 95 MG/DL
LYMPHOCYTES # BLD AUTO: 3.7 K/UL
LYMPHOCYTES NFR BLD: 49.8 %
MCH RBC QN AUTO: 20.5 PG
MCHC RBC AUTO-ENTMCNC: 28.3 G/DL
MCV RBC AUTO: 73 FL
MONOCYTES # BLD AUTO: 0.5 K/UL
MONOCYTES NFR BLD: 6.6 %
NEUTROPHILS # BLD AUTO: 2.9 K/UL
NEUTROPHILS NFR BLD: 39.6 %
NONHDLC SERPL-MCNC: 146 MG/DL
NRBC BLD-RTO: 0 /100 WBC
PLATELET # BLD AUTO: 302 K/UL
PMV BLD AUTO: 9.9 FL
POTASSIUM SERPL-SCNC: 4.9 MMOL/L
PROT SERPL-MCNC: 8.2 G/DL
RBC # BLD AUTO: 5.99 M/UL
SODIUM SERPL-SCNC: 140 MMOL/L
TRIGL SERPL-MCNC: 255 MG/DL
WBC # BLD AUTO: 7.37 K/UL

## 2018-07-05 PROCEDURE — 36415 COLL VENOUS BLD VENIPUNCTURE: CPT | Mod: PO

## 2018-07-05 PROCEDURE — 99499 UNLISTED E&M SERVICE: CPT | Mod: ,,, | Performed by: INTERNAL MEDICINE

## 2018-07-05 PROCEDURE — 80061 LIPID PANEL: CPT

## 2018-07-05 PROCEDURE — 83036 HEMOGLOBIN GLYCOSYLATED A1C: CPT

## 2018-07-05 PROCEDURE — 85025 COMPLETE CBC W/AUTO DIFF WBC: CPT

## 2018-07-05 PROCEDURE — 99999 PR PBB SHADOW E&M-EST. PATIENT-LVL III: CPT | Mod: PBBFAC,,, | Performed by: INTERNAL MEDICINE

## 2018-07-05 PROCEDURE — 99499 UNLISTED E&M SERVICE: CPT | Mod: S$GLB,,, | Performed by: INTERNAL MEDICINE

## 2018-07-05 PROCEDURE — 3077F SYST BP >= 140 MM HG: CPT | Mod: CPTII,S$GLB,, | Performed by: INTERNAL MEDICINE

## 2018-07-05 PROCEDURE — 3079F DIAST BP 80-89 MM HG: CPT | Mod: CPTII,S$GLB,, | Performed by: INTERNAL MEDICINE

## 2018-07-05 PROCEDURE — 82728 ASSAY OF FERRITIN: CPT

## 2018-07-05 PROCEDURE — 3045F PR MOST RECENT HEMOGLOBIN A1C LEVEL 7.0-9.0%: CPT | Mod: CPTII,S$GLB,, | Performed by: INTERNAL MEDICINE

## 2018-07-05 PROCEDURE — 99214 OFFICE O/P EST MOD 30 MIN: CPT | Mod: S$GLB,,, | Performed by: INTERNAL MEDICINE

## 2018-07-05 PROCEDURE — 80053 COMPREHEN METABOLIC PANEL: CPT

## 2018-07-05 RX ORDER — PRAVASTATIN SODIUM 80 MG/1
TABLET ORAL
Qty: 90 TABLET | Refills: 3 | Status: SHIPPED | OUTPATIENT
Start: 2018-07-05 | End: 2018-07-09

## 2018-07-05 RX ORDER — AMLODIPINE BESYLATE 10 MG/1
10 TABLET ORAL DAILY
Qty: 90 TABLET | Refills: 3 | Status: SHIPPED | OUTPATIENT
Start: 2018-07-05 | End: 2019-07-24 | Stop reason: SDUPTHER

## 2018-07-05 RX ORDER — METFORMIN HYDROCHLORIDE 1000 MG/1
1000 TABLET ORAL 2 TIMES DAILY WITH MEALS
Qty: 180 TABLET | Refills: 3 | Status: SHIPPED | OUTPATIENT
Start: 2018-07-05 | End: 2019-01-10 | Stop reason: SDUPTHER

## 2018-07-05 RX ORDER — METOPROLOL SUCCINATE 100 MG/1
100 TABLET, EXTENDED RELEASE ORAL DAILY
Qty: 90 TABLET | Refills: 3 | Status: SHIPPED | OUTPATIENT
Start: 2018-07-05 | End: 2019-07-24 | Stop reason: SDUPTHER

## 2018-07-05 RX ORDER — CYCLOBENZAPRINE HCL 5 MG
5 TABLET ORAL NIGHTLY
Qty: 30 TABLET | Refills: 0 | Status: SHIPPED | OUTPATIENT
Start: 2018-07-05 | End: 2018-08-04

## 2018-07-05 RX ORDER — LOSARTAN POTASSIUM 100 MG/1
100 TABLET ORAL DAILY
Qty: 90 TABLET | Refills: 3 | Status: SHIPPED | OUTPATIENT
Start: 2018-07-05 | End: 2019-07-24 | Stop reason: SDUPTHER

## 2018-07-05 RX ORDER — GLYBURIDE 2.5 MG/1
2.5 TABLET ORAL DAILY
Qty: 90 TABLET | Refills: 3 | Status: SHIPPED | OUTPATIENT
Start: 2018-07-05 | End: 2019-01-10 | Stop reason: SDUPTHER

## 2018-07-05 RX ORDER — ROPINIROLE 1 MG/1
1 TABLET, FILM COATED ORAL 3 TIMES DAILY
Qty: 180 TABLET | Refills: 3 | Status: SHIPPED | OUTPATIENT
Start: 2018-07-05 | End: 2019-07-24 | Stop reason: SDUPTHER

## 2018-07-05 RX ORDER — TRAZODONE HYDROCHLORIDE 50 MG/1
TABLET ORAL
Qty: 30 TABLET | Refills: 11 | Status: SHIPPED | OUTPATIENT
Start: 2018-07-05 | End: 2019-10-29

## 2018-07-09 ENCOUNTER — TELEPHONE (OUTPATIENT)
Dept: FAMILY MEDICINE | Facility: CLINIC | Age: 73
End: 2018-07-09

## 2018-07-09 DIAGNOSIS — E78.2 MIXED HYPERLIPIDEMIA: Primary | ICD-10-CM

## 2018-07-09 RX ORDER — ATORVASTATIN CALCIUM 40 MG/1
40 TABLET, FILM COATED ORAL DAILY
Qty: 90 TABLET | Refills: 3 | Status: SHIPPED | OUTPATIENT
Start: 2018-07-09 | End: 2019-07-23 | Stop reason: SDUPTHER

## 2018-07-09 NOTE — PROGRESS NOTES
Lipid better on prava but still not quite ideal nonHDL > 130. Would change to lipitor 40  Ferritin WNL.  Done for RLS type symptoms.  CBC - Hb normal and ferritin WNL but microcytosis with elevated RDW (mild).  mentzer index < 13, suspect this is underlying thalassemia.  Please call pt - her cholesterol was higher than ideal - stop the pravastatin and start lipitor 40 instead.  I will send to pharmacy.

## 2018-07-09 NOTE — TELEPHONE ENCOUNTER
Spoke with patient informed of results and rx sent to pharmacy. States no further question or concerns at this time. Pt verbalized understanding.

## 2018-07-09 NOTE — TELEPHONE ENCOUNTER
----- Message from Kameron Donato MD sent at 7/9/2018  8:54 AM CDT -----  Lipid better on prava but still not quite ideal nonHDL > 130. Would change to lipitor 40  Ferritin WNL.  Done for RLS type symptoms.  CBC - Hb normal and ferritin WNL but microcytosis with elevated RDW (mild).  mentzer index < 13, suspect this is underlying thalassemia.  Please call pt - her cholesterol was higher than ideal - stop the pravastatin and start lipitor 40 instead.  I will send to pharmacy.

## 2018-10-12 DIAGNOSIS — Z12.39 BREAST CANCER SCREENING: ICD-10-CM

## 2019-01-09 NOTE — PROGRESS NOTES
This note was created by combination of typed  and Dragon dictation.  Transcription errors may be present.  If there are any questions, please contact me.    Assessment / Plan:   Viral URI  -supportive care. She does not like nasal sprays. Tessalon for cough.  -     benzonatate (TESSALON) 100 MG capsule; Take 1 capsule (100 mg total) by mouth 3 (three) times daily as needed for Cough.  Dispense: 30 capsule; Refill: 0    Essential hypertension  -with white coat syndrome. No change.    Mixed hyperlipidemia; 7/2018 prava to atorva  -check labs on atorvastatin  -     Comprehensive metabolic panel; Future; Expected date: 01/10/2019  -     Lipid panel; Future; Expected date: 01/10/2019    Type 2 diabetes mellitus without complication, without long-term current use of insulin  Diabetes mellitus type 2 without retinopathy  -check a1c.  Notes morning glc 200s of late.  Advised to cut down on sweets.  For AM glc > 150 take 2 glyburide. Else, 1 glyburide daily. Stay on metformin.  -     Comprehensive metabolic panel; Future; Expected date: 01/10/2019  -     Hemoglobin A1c; Future; Expected date: 01/10/2019  -     metFORMIN (GLUCOPHAGE) 1000 MG tablet; Take 1 tablet (1,000 mg total) by mouth 2 (two) times daily with meals.  Dispense: 180 tablet; Refill: 3  -     glyBURIDE (DIABETA) 2.5 MG tablet; Take 1 tablet (2.5 mg total) by mouth once daily.  Dispense: 90 tablet; Refill: 3    H/O: stroke with residual effects  -check CBC  -     CBC auto differential; Future; Expected date: 01/10/2019    Needs flu shot  -     Influenza - High Dose (65+) (PF) (IM)          Medications Discontinued During This Encounter   Medication Reason    metFORMIN (GLUCOPHAGE) 1000 MG tablet Reorder    glyBURIDE (DIABETA) 2.5 MG tablet Reorder       meds sent this encounter:  Medications Ordered This Encounter   Medications    benzonatate (TESSALON) 100 MG capsule     Sig: Take 1 capsule (100 mg total) by mouth 3 (three) times daily as needed  for Cough.     Dispense:  30 capsule     Refill:  0    glyBURIDE (DIABETA) 2.5 MG tablet     Sig: Take 1 tablet (2.5 mg total) by mouth once daily.     Dispense:  90 tablet     Refill:  3          metFORMIN (GLUCOPHAGE) 1000 MG tablet     Sig: Take 1 tablet (1,000 mg total) by mouth 2 (two) times daily with meals.     Dispense:  180 tablet     Refill:  3             Follow Up: No Follow-up on file.      Subjective:     Chief Complaint   Patient presents with    Hypertension    Sinus Problem       HPI  Ramya is a 73 y.o. female, last appointment with this clinic was Visit date not found.    No LMP recorded. Patient has had a hysterectomy.    Diabetes type 2 without retinopathy  Hypertension  Hyperlipidemia; 4/2018 prava to atorva  History of CVA  9/24/2012 DEXA scan normal  Restless leg syndrome, ropinirole  Benign flow murmur, TTE 6/2018 normal     7/2018 Lipid better on prava but still not quite ideal nonHDL > 130. Would change to lipitor 40  Ferritin WNL.  Done for RLS type symptoms.  CBC - Hb normal and ferritin WNL but microcytosis with elevated RDW (mild).  mentzer index < 13, suspect this is underlying thalassemia.    Need to verify home cuff    URI sx x 1 week with sinus congestion, drip, cough. No chest congestion.  No sick contacts.  OTC zyrtec, for cough dayquil. No sinus pain no pressure.  Does not really like nasal sprays.     Home readings 120/80s.   Glucose high since the holidays. 200s in the morning. Glyburide once daily.  Metformin. No hypoglycemia    Needs shots and labs.    Patient Care Team:  Kameron Donato MD as PCP - General (Internal Medicine)    Patient Active Problem List    Diagnosis Date Noted    RLS (restless legs syndrome) 07/05/2018    Flow murmur, 6/2018 TTE WNL 06/06/2018    Nuclear sclerosis of both eyes 05/11/2018    Refractive errors 05/11/2018    Diabetes mellitus type 2 without retinopathy 09/20/2016    Mixed hyperlipidemia; 7/2018 prava to atorva 09/18/2012     Essential hypertension 2012    Type 2 diabetes mellitus without complication, without long-term current use of insulin 2012    H/O: stroke with residual effects 2012       PAST MEDICAL HISTORY:  Past Medical History:   Diagnosis Date    Diabetes mellitus type II     Hyperlipidemia     Hypertension     Nuclear sclerosis of both eyes 2018    Stroke        PAST SURGICAL HISTORY:  Past Surgical History:   Procedure Laterality Date    4 MISCARRIAGES       8 PARA 4      HYSTERECTOMY      laser right eye  Right ? yrs    pt had stroke and tried to bring vision back        SOCIAL HISTORY:  Social History     Socioeconomic History    Marital status:      Spouse name: Not on file    Number of children: Not on file    Years of education: Not on file    Highest education level: Not on file   Social Needs    Financial resource strain: Not on file    Food insecurity - worry: Not on file    Food insecurity - inability: Not on file    Transportation needs - medical: Not on file    Transportation needs - non-medical: Not on file   Occupational History    Not on file   Tobacco Use    Smoking status: Never Smoker    Smokeless tobacco: Never Used   Substance and Sexual Activity    Alcohol use: No    Drug use: No    Sexual activity: Not on file   Other Topics Concern    Not on file   Social History Narrative    Lives in [place].    2014.         ALLERGIES AND MEDICATIONS: updated and reviewed.  Review of patient's allergies indicates:   Allergen Reactions    Citalopram analogues Other (See Comments)     Hallucinations     Current Outpatient Medications   Medication Sig Dispense Refill    amLODIPine (NORVASC) 10 MG tablet Take 1 tablet (10 mg total) by mouth once daily. 90 tablet 3    antiox #11-om3-dha-epa-lut-gold (OCUVITE ADULT 50+) 250-5-1 mg Cap       aspirin 325 MG tablet Take 325 mg by mouth. 1 Tablet Oral Every day      atorvastatin  "(LIPITOR) 40 MG tablet Take 1 tablet (40 mg total) by mouth once daily. 90 tablet 3    BLOOD SUGAR DIAGNOSTIC (TRUE METRIX GLUCOSE TEST STRIP MISC) 1 strip by Misc.(Non-Drug; Combo Route) route 3 (three) times daily.      glyBURIDE (DIABETA) 2.5 MG tablet Take 1 tablet (2.5 mg total) by mouth once daily. 90 tablet 3    losartan (COZAAR) 100 MG tablet Take 1 tablet (100 mg total) by mouth once daily. 90 tablet 3    metFORMIN (GLUCOPHAGE) 1000 MG tablet Take 1 tablet (1,000 mg total) by mouth 2 (two) times daily with meals. 180 tablet 3    metoprolol succinate (TOPROL-XL) 100 MG 24 hr tablet Take 1 tablet (100 mg total) by mouth once daily. 90 tablet 3    multivit & mins-ferrous glucon (CENTRUM) 9 mg/15 mL iron Liqd Take by mouth. 1 Liquid Oral Every day      rOPINIRole (REQUIP) 1 MG tablet Take 1 tablet (1 mg total) by mouth 3 (three) times daily. 180 tablet 3    traZODone (DESYREL) 50 MG tablet 1/2 to 1 tablet, 1 hour prior to bedtime, as needed for sleep 30 tablet 11    triamcinolone acetonide 0.1% (KENALOG) 0.1 % cream Apply topically 2 (two) times daily. 45 g 5     No current facility-administered medications for this visit.        Review of Systems   Constitutional: Negative for chills and fever.   Cardiovascular: Negative for chest pain and palpitations.       Objective:   Physical Exam   Vitals:    01/10/19 0825 01/10/19 0836   BP: (!) 160/80 138/80   BP Location:  Left arm   Patient Position:  Sitting   BP Method:  Medium (Manual)   Pulse: 78    Temp: 98.4 °F (36.9 °C)    SpO2: 98%    Weight: 69.2 kg (152 lb 8.9 oz)    Height: 5' 3" (1.6 m)     Body mass index is 27.02 kg/m².  Weight: 69.2 kg (152 lb 8.9 oz)   Height: 5' 3" (160 cm)     Physical Exam   Constitutional: She is oriented to person, place, and time. She appears well-developed and well-nourished.   HENT:   TMs grey/clear bilaterally.  OP no erythema no exudates   Eyes: EOM are normal.   Neck: Neck supple.   Cardiovascular: Normal rate, " regular rhythm and normal heart sounds.   Pulmonary/Chest: Effort normal and breath sounds normal. She has no wheezes.   Lymphadenopathy:     She has no cervical adenopathy.   Neurological: She is alert and oriented to person, place, and time.   Skin: Skin is warm and dry.   Psychiatric: She has a normal mood and affect. Her behavior is normal.

## 2019-01-10 ENCOUNTER — LAB VISIT (OUTPATIENT)
Dept: LAB | Facility: HOSPITAL | Age: 74
End: 2019-01-10
Attending: INTERNAL MEDICINE
Payer: MEDICARE

## 2019-01-10 ENCOUNTER — OFFICE VISIT (OUTPATIENT)
Dept: FAMILY MEDICINE | Facility: CLINIC | Age: 74
End: 2019-01-10
Payer: MEDICARE

## 2019-01-10 VITALS
TEMPERATURE: 98 F | DIASTOLIC BLOOD PRESSURE: 80 MMHG | BODY MASS INDEX: 27.03 KG/M2 | OXYGEN SATURATION: 98 % | SYSTOLIC BLOOD PRESSURE: 138 MMHG | HEART RATE: 78 BPM | HEIGHT: 63 IN | WEIGHT: 152.56 LBS

## 2019-01-10 DIAGNOSIS — E11.9 DIABETES MELLITUS TYPE 2 WITHOUT RETINOPATHY: ICD-10-CM

## 2019-01-10 DIAGNOSIS — E11.9 TYPE 2 DIABETES MELLITUS WITHOUT COMPLICATION, WITHOUT LONG-TERM CURRENT USE OF INSULIN: ICD-10-CM

## 2019-01-10 DIAGNOSIS — I10 ESSENTIAL HYPERTENSION: ICD-10-CM

## 2019-01-10 DIAGNOSIS — I69.30 H/O: STROKE WITH RESIDUAL EFFECTS: ICD-10-CM

## 2019-01-10 DIAGNOSIS — E78.2 MIXED HYPERLIPIDEMIA: ICD-10-CM

## 2019-01-10 DIAGNOSIS — Z23 NEEDS FLU SHOT: ICD-10-CM

## 2019-01-10 DIAGNOSIS — J06.9 VIRAL URI: Primary | ICD-10-CM

## 2019-01-10 LAB
ALBUMIN SERPL BCP-MCNC: 3.9 G/DL
ALP SERPL-CCNC: 107 U/L
ALT SERPL W/O P-5'-P-CCNC: 25 U/L
ANION GAP SERPL CALC-SCNC: 11 MMOL/L
AST SERPL-CCNC: 28 U/L
BASOPHILS # BLD AUTO: 0.1 K/UL
BASOPHILS NFR BLD: 1.2 %
BILIRUB SERPL-MCNC: 0.9 MG/DL
BUN SERPL-MCNC: 17 MG/DL
CALCIUM SERPL-MCNC: 9.8 MG/DL
CHLORIDE SERPL-SCNC: 101 MMOL/L
CHOLEST SERPL-MCNC: 151 MG/DL
CHOLEST/HDLC SERPL: 3.4 {RATIO}
CO2 SERPL-SCNC: 24 MMOL/L
CREAT SERPL-MCNC: 0.8 MG/DL
DIFFERENTIAL METHOD: ABNORMAL
EOSINOPHIL # BLD AUTO: 0.3 K/UL
EOSINOPHIL NFR BLD: 3.7 %
ERYTHROCYTE [DISTWIDTH] IN BLOOD BY AUTOMATED COUNT: 14.4 %
EST. GFR  (AFRICAN AMERICAN): >60 ML/MIN/1.73 M^2
EST. GFR  (NON AFRICAN AMERICAN): >60 ML/MIN/1.73 M^2
ESTIMATED AVG GLUCOSE: 203 MG/DL
GLUCOSE SERPL-MCNC: 244 MG/DL
HBA1C MFR BLD HPLC: 8.7 %
HCT VFR BLD AUTO: 41 %
HDLC SERPL-MCNC: 44 MG/DL
HDLC SERPL: 29.1 %
HGB BLD-MCNC: 12.2 G/DL
IMM GRANULOCYTES # BLD AUTO: 0.02 K/UL
IMM GRANULOCYTES NFR BLD AUTO: 0.2 %
LDLC SERPL CALC-MCNC: 45 MG/DL
LYMPHOCYTES # BLD AUTO: 4.1 K/UL
LYMPHOCYTES NFR BLD: 48 %
MCH RBC QN AUTO: 21.1 PG
MCHC RBC AUTO-ENTMCNC: 29.8 G/DL
MCV RBC AUTO: 71 FL
MONOCYTES # BLD AUTO: 0.6 K/UL
MONOCYTES NFR BLD: 6.5 %
NEUTROPHILS # BLD AUTO: 3.5 K/UL
NEUTROPHILS NFR BLD: 40.4 %
NONHDLC SERPL-MCNC: 107 MG/DL
NRBC BLD-RTO: 0 /100 WBC
PLATELET # BLD AUTO: 309 K/UL
PMV BLD AUTO: 10.4 FL
POTASSIUM SERPL-SCNC: 4.3 MMOL/L
PROT SERPL-MCNC: 7.9 G/DL
RBC # BLD AUTO: 5.78 M/UL
SODIUM SERPL-SCNC: 136 MMOL/L
TRIGL SERPL-MCNC: 310 MG/DL
WBC # BLD AUTO: 8.59 K/UL

## 2019-01-10 PROCEDURE — 83036 HEMOGLOBIN GLYCOSYLATED A1C: CPT

## 2019-01-10 PROCEDURE — 99499 UNLISTED E&M SERVICE: CPT | Mod: S$GLB,,, | Performed by: INTERNAL MEDICINE

## 2019-01-10 PROCEDURE — 36415 COLL VENOUS BLD VENIPUNCTURE: CPT | Mod: PO

## 2019-01-10 PROCEDURE — 3079F DIAST BP 80-89 MM HG: CPT | Mod: CPTII,S$GLB,, | Performed by: INTERNAL MEDICINE

## 2019-01-10 PROCEDURE — 99999 PR PBB SHADOW E&M-EST. PATIENT-LVL IV: ICD-10-PCS | Mod: PBBFAC,,, | Performed by: INTERNAL MEDICINE

## 2019-01-10 PROCEDURE — 80061 LIPID PANEL: CPT

## 2019-01-10 PROCEDURE — 3045F PR MOST RECENT HEMOGLOBIN A1C LEVEL 7.0-9.0%: CPT | Mod: CPTII,S$GLB,, | Performed by: INTERNAL MEDICINE

## 2019-01-10 PROCEDURE — 90662 IIV NO PRSV INCREASED AG IM: CPT | Mod: S$GLB,,, | Performed by: INTERNAL MEDICINE

## 2019-01-10 PROCEDURE — 3075F SYST BP GE 130 - 139MM HG: CPT | Mod: CPTII,S$GLB,, | Performed by: INTERNAL MEDICINE

## 2019-01-10 PROCEDURE — 80053 COMPREHEN METABOLIC PANEL: CPT

## 2019-01-10 PROCEDURE — 3079F PR MOST RECENT DIASTOLIC BLOOD PRESSURE 80-89 MM HG: ICD-10-PCS | Mod: CPTII,S$GLB,, | Performed by: INTERNAL MEDICINE

## 2019-01-10 PROCEDURE — 90662 FLU VACCINE - HIGH DOSE (65+) PRESERVATIVE FREE IM: ICD-10-PCS | Mod: S$GLB,,, | Performed by: INTERNAL MEDICINE

## 2019-01-10 PROCEDURE — 3075F PR MOST RECENT SYSTOLIC BLOOD PRESS GE 130-139MM HG: ICD-10-PCS | Mod: CPTII,S$GLB,, | Performed by: INTERNAL MEDICINE

## 2019-01-10 PROCEDURE — 1101F PT FALLS ASSESS-DOCD LE1/YR: CPT | Mod: CPTII,S$GLB,, | Performed by: INTERNAL MEDICINE

## 2019-01-10 PROCEDURE — 99999 PR PBB SHADOW E&M-EST. PATIENT-LVL IV: CPT | Mod: PBBFAC,,, | Performed by: INTERNAL MEDICINE

## 2019-01-10 PROCEDURE — 99499 RISK ADDL DX/OHS AUDIT: ICD-10-PCS | Mod: S$GLB,,, | Performed by: INTERNAL MEDICINE

## 2019-01-10 PROCEDURE — 85025 COMPLETE CBC W/AUTO DIFF WBC: CPT

## 2019-01-10 PROCEDURE — 1101F PR PT FALLS ASSESS DOC 0-1 FALLS W/OUT INJ PAST YR: ICD-10-PCS | Mod: CPTII,S$GLB,, | Performed by: INTERNAL MEDICINE

## 2019-01-10 PROCEDURE — G0008 ADMIN INFLUENZA VIRUS VAC: HCPCS | Mod: S$GLB,,, | Performed by: INTERNAL MEDICINE

## 2019-01-10 PROCEDURE — 99214 PR OFFICE/OUTPT VISIT, EST, LEVL IV, 30-39 MIN: ICD-10-PCS | Mod: 25,S$GLB,, | Performed by: INTERNAL MEDICINE

## 2019-01-10 PROCEDURE — 3045F PR MOST RECENT HEMOGLOBIN A1C LEVEL 7.0-9.0%: ICD-10-PCS | Mod: CPTII,S$GLB,, | Performed by: INTERNAL MEDICINE

## 2019-01-10 PROCEDURE — G0008 FLU VACCINE - HIGH DOSE (65+) PRESERVATIVE FREE IM: ICD-10-PCS | Mod: S$GLB,,, | Performed by: INTERNAL MEDICINE

## 2019-01-10 PROCEDURE — 99214 OFFICE O/P EST MOD 30 MIN: CPT | Mod: 25,S$GLB,, | Performed by: INTERNAL MEDICINE

## 2019-01-10 RX ORDER — GLYBURIDE 2.5 MG/1
2.5 TABLET ORAL DAILY
Qty: 90 TABLET | Refills: 3 | Status: SHIPPED | OUTPATIENT
Start: 2019-01-10 | End: 2019-04-11

## 2019-01-10 RX ORDER — BENZONATATE 100 MG/1
100 CAPSULE ORAL 3 TIMES DAILY PRN
Qty: 30 CAPSULE | Refills: 0 | Status: SHIPPED | OUTPATIENT
Start: 2019-01-10 | End: 2019-01-20

## 2019-01-10 RX ORDER — METFORMIN HYDROCHLORIDE 1000 MG/1
1000 TABLET ORAL 2 TIMES DAILY WITH MEALS
Qty: 180 TABLET | Refills: 3 | Status: SHIPPED | OUTPATIENT
Start: 2019-01-10 | End: 2019-07-24 | Stop reason: SDUPTHER

## 2019-01-10 NOTE — PATIENT INSTRUCTIONS
FOR MORNING BLOOD SUGAR MORE THAN 150 - TAKE 2 OF THE GLYBURIDE    -150 - TAKE 1 GLYBURIDE    STAY ON ALL OTHER MEDICINES.    CUT DOWN ON SWEETS!

## 2019-01-11 NOTE — PROGRESS NOTES
Microcytosis seen previuosly. mentzer index < 13, suspect this is underlying thalassemia.  a1c high expected - she said she was seeing high glc. Plan at OV was to increase the glipizide for glc > 150  Lipid nonfasting TG high nonHDL OK no change. On lipitor  CMP WNL  Results to pt. No change in regimen. OV 3 months.

## 2019-04-09 PROBLEM — Z13.820 SCREENING FOR OSTEOPOROSIS: Status: ACTIVE | Noted: 2019-04-09

## 2019-04-09 PROBLEM — D50.9 MICROCYTIC ANEMIA: Status: ACTIVE | Noted: 2019-04-09

## 2019-04-09 NOTE — PROGRESS NOTES
This note was created by combination of typed  and Dragon dictation.  Transcription errors may be present.  If there are any questions, please contact me.    Assessment / Plan:   Type 2 diabetes mellitus without complication, without long-term current use of insulin  -weakness for cookies and sweets.  Taking glyburide 2 tablets for glc > 150, else one tablet. On metformin. On ARB. On statin. Check a1c. Lipid was good.  -     Hemoglobin A1c; Future; Expected date: 04/10/2019    Microcytic anemia mentzer index < 13, suspect this is underlying thalassemia. normal ferritin    There are no discontinued medications.    meds sent this encounter:       Follow Up: No follow-ups on file. OV 6 months.      Subjective:     Chief Complaint   Patient presents with    Hypertension    Hyperlipidemia    Diabetes       HPI  Ramya is a 73 y.o. female, last appointment with this clinic was 1/10/2019.    No LMP recorded. Patient has had a hysterectomy.    1/2019 Microcytosis seen previuosly. mentzer index < 13, suspect this is underlying thalassemia.  a1c high expected - she said she was seeing high glc. Plan at OV was to increase the glipizide for glc > 150  Lipid nonfasting TG high nonHDL OK no change. On lipitor  CMP WNL    Her BP cuff broke. Plans to replace.  Home glucose readings - fasting this . She takes 2 glyburide when glc > 150. Variable frequency.   Denies hypoglycemia.     Trazodone PRN for insomnia.  Estimates 2 times a week.     Weakness for candy.  Gave that up for lent. But does cookies.  3 in a day. She likes jelly beans, ranjit bars.     Granddaughter getting  around June and pt will be going with them on a cruise.    Patient Care Team:  Kameron Donato MD as PCP - General (Internal Medicine)    Patient Active Problem List    Diagnosis Date Noted    Screening for osteoporosis 9/24/2012 DXA normal 04/09/2019    Microcytic anemia mentzer index < 13, suspect this is underlying  thalassemia. normal ferritin 2019    RLS (restless legs syndrome) 2018    Flow murmur, 2018 TTE WNL 2018    Nuclear sclerosis of both eyes 2018    Refractive errors 2018    Diabetes mellitus type 2 without retinopathy 2016    Mixed hyperlipidemia; 2018 prava to atorva 2012    Essential hypertension 2012    Type 2 diabetes mellitus without complication, without long-term current use of insulin 2012    H/O: stroke with residual effects 2012       PAST MEDICAL HISTORY:  Past Medical History:   Diagnosis Date    Diabetes mellitus type II     Hyperlipidemia     Hypertension     Nuclear sclerosis of both eyes 2018    Stroke        PAST SURGICAL HISTORY:  Past Surgical History:   Procedure Laterality Date    4 MISCARRIAGES       8 PARA 4      HYSTERECTOMY      laser right eye  Right ? yrs    pt had stroke and tried to bring vision back        SOCIAL HISTORY:  Social History     Socioeconomic History    Marital status:      Spouse name: Not on file    Number of children: Not on file    Years of education: Not on file    Highest education level: Not on file   Occupational History    Not on file   Social Needs    Financial resource strain: Not on file    Food insecurity:     Worry: Not on file     Inability: Not on file    Transportation needs:     Medical: Not on file     Non-medical: Not on file   Tobacco Use    Smoking status: Never Smoker    Smokeless tobacco: Never Used   Substance and Sexual Activity    Alcohol use: No    Drug use: No    Sexual activity: Not on file   Lifestyle    Physical activity:     Days per week: Not on file     Minutes per session: Not on file    Stress: Not on file   Relationships    Social connections:     Talks on phone: Not on file     Gets together: Not on file     Attends Adventism service: Not on file     Active member of club or organization: Not on file     Attends meetings  of clubs or organizations: Not on file     Relationship status: Not on file   Other Topics Concern    Not on file   Social History Narrative    Lives in [place].    2014.          ALLERGIES AND MEDICATIONS: updated and reviewed.  Review of patient's allergies indicates:   Allergen Reactions    Citalopram analogues Other (See Comments)     Hallucinations     Current Outpatient Medications   Medication Sig Dispense Refill    amLODIPine (NORVASC) 10 MG tablet Take 1 tablet (10 mg total) by mouth once daily. 90 tablet 3    antiox #[id number]-gold (OCUVITE ADULT 50+) 250-5-1 mg Cap       aspirin 325 MG tablet Take 325 mg by mouth. 1 Tablet Oral Every day      atorvastatin (LIPITOR) 40 MG tablet Take 1 tablet (40 mg total) by mouth once daily. 90 tablet 3    BLOOD SUGAR DIAGNOSTIC (TRUE METRIX GLUCOSE TEST STRIP MISC) 1 strip by Misc.(Non-Drug; Combo Route) route 3 (three) times daily.      glyBURIDE (DIABETA) 2.5 MG tablet Take 1 tablet (2.5 mg total) by mouth once daily. 90 tablet 3    losartan (COZAAR) 100 MG tablet Take 1 tablet (100 mg total) by mouth once daily. 90 tablet 3    metFORMIN (GLUCOPHAGE) 1000 MG tablet Take 1 tablet (1,000 mg total) by mouth 2 (two) times daily with meals. 180 tablet 3    metoprolol succinate (TOPROL-XL) 100 MG 24 hr tablet Take 1 tablet (100 mg total) by mouth once daily. 90 tablet 3    multivit & mins-ferrous glucon (CENTRUM) 9 mg/15 mL iron Liqd Take by mouth. 1 Liquid Oral Every day      rOPINIRole (REQUIP) 1 MG tablet Take 1 tablet (1 mg total) by mouth 3 (three) times daily. 180 tablet 3    traZODone (DESYREL) 50 MG tablet 1/2 to 1 tablet, 1 hour prior to bedtime, as needed for sleep 30 tablet 11    triamcinolone acetonide 0.1% (KENALOG) 0.1 % cream Apply topically 2 (two) times daily. 45 g 5     No current facility-administered medications for this visit.        Review of Systems   Constitutional: Negative for chills and fever.  "  Cardiovascular: Negative for chest pain and palpitations.       Objective:   Physical Exam   Vitals:    04/10/19 1300   BP: 128/80   Pulse: 74   Temp: 98 °F (36.7 °C)   SpO2: 98%   Weight: 69.6 kg (153 lb 5.3 oz)   Height: 5' 3" (1.6 m)    Body mass index is 27.16 kg/m².  Weight: 69.6 kg (153 lb 5.3 oz)   Height: 5' 3" (160 cm)     Physical Exam   Constitutional: She is oriented to person, place, and time. She appears well-developed and well-nourished. No distress.   Eyes: EOM are normal.   Cardiovascular: Normal rate, regular rhythm and normal heart sounds.   No murmur heard.  Pulses:       Dorsalis pedis pulses are 0 on the right side, and 3+ on the left side.        Posterior tibial pulses are 0 on the right side, and 0 on the left side.   Pulmonary/Chest: Effort normal and breath sounds normal.   Musculoskeletal: Normal range of motion.        Right foot: There is no deformity.        Left foot: There is no deformity.   Feet:   Right Foot:   Protective Sensation: 5 sites tested. 5 sites sensed.   Skin Integrity: Negative for ulcer, blister, skin breakdown, erythema or warmth.   Left Foot:   Protective Sensation: 5 sites tested. 5 sites sensed.   Skin Integrity: Negative for ulcer, blister, skin breakdown, erythema or warmth.   Neurological: She is alert and oriented to person, place, and time. Coordination normal.   Skin: Skin is warm and dry.   Psychiatric: She has a normal mood and affect. Her behavior is normal. Thought content normal.     "

## 2019-04-10 ENCOUNTER — OFFICE VISIT (OUTPATIENT)
Dept: FAMILY MEDICINE | Facility: CLINIC | Age: 74
End: 2019-04-10
Payer: MEDICARE

## 2019-04-10 ENCOUNTER — LAB VISIT (OUTPATIENT)
Dept: LAB | Facility: HOSPITAL | Age: 74
End: 2019-04-10
Attending: INTERNAL MEDICINE
Payer: MEDICARE

## 2019-04-10 VITALS
BODY MASS INDEX: 27.16 KG/M2 | HEIGHT: 63 IN | DIASTOLIC BLOOD PRESSURE: 80 MMHG | WEIGHT: 153.31 LBS | TEMPERATURE: 98 F | OXYGEN SATURATION: 98 % | HEART RATE: 74 BPM | SYSTOLIC BLOOD PRESSURE: 128 MMHG

## 2019-04-10 DIAGNOSIS — D50.9 MICROCYTIC ANEMIA: ICD-10-CM

## 2019-04-10 DIAGNOSIS — E11.9 TYPE 2 DIABETES MELLITUS WITHOUT COMPLICATION, WITHOUT LONG-TERM CURRENT USE OF INSULIN: ICD-10-CM

## 2019-04-10 DIAGNOSIS — E11.9 TYPE 2 DIABETES MELLITUS WITHOUT COMPLICATION, WITHOUT LONG-TERM CURRENT USE OF INSULIN: Primary | ICD-10-CM

## 2019-04-10 LAB
ESTIMATED AVG GLUCOSE: 180 MG/DL (ref 68–131)
HBA1C MFR BLD HPLC: 7.9 % (ref 4–5.6)

## 2019-04-10 PROCEDURE — 83036 HEMOGLOBIN GLYCOSYLATED A1C: CPT

## 2019-04-10 PROCEDURE — 36415 COLL VENOUS BLD VENIPUNCTURE: CPT | Mod: PO

## 2019-04-10 PROCEDURE — 99213 PR OFFICE/OUTPT VISIT, EST, LEVL III, 20-29 MIN: ICD-10-PCS | Mod: S$GLB,,, | Performed by: INTERNAL MEDICINE

## 2019-04-10 PROCEDURE — 3045F PR MOST RECENT HEMOGLOBIN A1C LEVEL 7.0-9.0%: CPT | Mod: CPTII,S$GLB,, | Performed by: INTERNAL MEDICINE

## 2019-04-10 PROCEDURE — 3074F PR MOST RECENT SYSTOLIC BLOOD PRESSURE < 130 MM HG: ICD-10-PCS | Mod: CPTII,S$GLB,, | Performed by: INTERNAL MEDICINE

## 2019-04-10 PROCEDURE — 3074F SYST BP LT 130 MM HG: CPT | Mod: CPTII,S$GLB,, | Performed by: INTERNAL MEDICINE

## 2019-04-10 PROCEDURE — 99213 OFFICE O/P EST LOW 20 MIN: CPT | Mod: S$GLB,,, | Performed by: INTERNAL MEDICINE

## 2019-04-10 PROCEDURE — 99499 UNLISTED E&M SERVICE: CPT | Mod: S$GLB,,, | Performed by: INTERNAL MEDICINE

## 2019-04-10 PROCEDURE — 99499 RISK ADDL DX/OHS AUDIT: ICD-10-PCS | Mod: ,,, | Performed by: INTERNAL MEDICINE

## 2019-04-10 PROCEDURE — 3079F DIAST BP 80-89 MM HG: CPT | Mod: CPTII,S$GLB,, | Performed by: INTERNAL MEDICINE

## 2019-04-10 PROCEDURE — 99999 PR PBB SHADOW E&M-EST. PATIENT-LVL III: ICD-10-PCS | Mod: PBBFAC,,, | Performed by: INTERNAL MEDICINE

## 2019-04-10 PROCEDURE — 1101F PR PT FALLS ASSESS DOC 0-1 FALLS W/OUT INJ PAST YR: ICD-10-PCS | Mod: CPTII,S$GLB,, | Performed by: INTERNAL MEDICINE

## 2019-04-10 PROCEDURE — 99499 RISK ADDL DX/OHS AUDIT: ICD-10-PCS | Mod: S$GLB,,, | Performed by: INTERNAL MEDICINE

## 2019-04-10 PROCEDURE — 3079F PR MOST RECENT DIASTOLIC BLOOD PRESSURE 80-89 MM HG: ICD-10-PCS | Mod: CPTII,S$GLB,, | Performed by: INTERNAL MEDICINE

## 2019-04-10 PROCEDURE — 99999 PR PBB SHADOW E&M-EST. PATIENT-LVL III: CPT | Mod: PBBFAC,,, | Performed by: INTERNAL MEDICINE

## 2019-04-10 PROCEDURE — 99499 UNLISTED E&M SERVICE: CPT | Mod: ,,, | Performed by: INTERNAL MEDICINE

## 2019-04-10 PROCEDURE — 1101F PT FALLS ASSESS-DOCD LE1/YR: CPT | Mod: CPTII,S$GLB,, | Performed by: INTERNAL MEDICINE

## 2019-04-10 PROCEDURE — 3045F PR MOST RECENT HEMOGLOBIN A1C LEVEL 7.0-9.0%: ICD-10-PCS | Mod: CPTII,S$GLB,, | Performed by: INTERNAL MEDICINE

## 2019-04-11 ENCOUNTER — TELEPHONE (OUTPATIENT)
Dept: FAMILY MEDICINE | Facility: CLINIC | Age: 74
End: 2019-04-11

## 2019-04-11 DIAGNOSIS — E11.9 DIABETES MELLITUS TYPE 2 WITHOUT RETINOPATHY: ICD-10-CM

## 2019-04-11 DIAGNOSIS — I10 ESSENTIAL HYPERTENSION: ICD-10-CM

## 2019-04-11 RX ORDER — METOPROLOL SUCCINATE 100 MG/1
TABLET, EXTENDED RELEASE ORAL
Qty: 90 TABLET | Refills: 3 | Status: SHIPPED | OUTPATIENT
Start: 2019-04-11 | End: 2019-07-24 | Stop reason: SDUPTHER

## 2019-04-11 RX ORDER — GLYBURIDE 2.5 MG/1
5 TABLET ORAL DAILY
Qty: 180 TABLET | Refills: 3 | Status: SHIPPED | OUTPATIENT
Start: 2019-04-11 | End: 2019-07-24 | Stop reason: SDUPTHER

## 2019-04-11 NOTE — TELEPHONE ENCOUNTER
Patient notified of charted test result with change in medication dose as charted. Patient verbalize understanding.

## 2019-04-11 NOTE — PROGRESS NOTES
a1c better still could be even better.   Please call pt - have her take the glyburide 2 pills every morning.

## 2019-04-11 NOTE — TELEPHONE ENCOUNTER
----- Message from Kameron Donato MD sent at 4/11/2019 12:53 PM CDT -----  a1c better still could be even better.   Please call pt - have her take the glyburide 2 pills every morning.

## 2019-05-13 ENCOUNTER — OFFICE VISIT (OUTPATIENT)
Dept: OPTOMETRY | Facility: CLINIC | Age: 74
End: 2019-05-13
Payer: MEDICARE

## 2019-05-13 DIAGNOSIS — I69.30 H/O: STROKE WITH RESIDUAL EFFECTS: ICD-10-CM

## 2019-05-13 DIAGNOSIS — E11.9 TYPE 2 DIABETES MELLITUS WITHOUT RETINOPATHY: Primary | ICD-10-CM

## 2019-05-13 DIAGNOSIS — H25.13 NUCLEAR SCLEROSIS OF BOTH EYES: ICD-10-CM

## 2019-05-13 DIAGNOSIS — H52.7 REFRACTIVE ERRORS: ICD-10-CM

## 2019-05-13 PROCEDURE — 92015 PR REFRACTION: ICD-10-PCS | Mod: S$GLB,,, | Performed by: OPTOMETRIST

## 2019-05-13 PROCEDURE — 92014 PR EYE EXAM, EST PATIENT,COMPREHESV: ICD-10-PCS | Mod: S$GLB,,, | Performed by: OPTOMETRIST

## 2019-05-13 PROCEDURE — 99499 UNLISTED E&M SERVICE: CPT | Mod: S$GLB,,, | Performed by: OPTOMETRIST

## 2019-05-13 PROCEDURE — 99499 RISK ADDL DX/OHS AUDIT: ICD-10-PCS | Mod: S$GLB,,, | Performed by: OPTOMETRIST

## 2019-05-13 PROCEDURE — 99999 PR PBB SHADOW E&M-EST. PATIENT-LVL II: ICD-10-PCS | Mod: PBBFAC,,, | Performed by: OPTOMETRIST

## 2019-05-13 PROCEDURE — 99999 PR PBB SHADOW E&M-EST. PATIENT-LVL II: CPT | Mod: PBBFAC,,, | Performed by: OPTOMETRIST

## 2019-05-13 PROCEDURE — 92014 COMPRE OPH EXAM EST PT 1/>: CPT | Mod: S$GLB,,, | Performed by: OPTOMETRIST

## 2019-05-13 PROCEDURE — 92015 DETERMINE REFRACTIVE STATE: CPT | Mod: S$GLB,,, | Performed by: OPTOMETRIST

## 2019-05-13 NOTE — PROGRESS NOTES
Subjective:       Patient ID: Ramya Mayo is a 73 y.o. female      Chief Complaint   Patient presents with    Diabetic Eye Exam     History of Present Illness  Dls: 5/11/18 Dr. Baltazar    74 y/o female presents today for diabetic eye exam.   Pt states no change sin vision. Pt wears bifocal glasses. Pt wants a new   rx for glasses.       this am     + tearing  + itching  No burning  No pain  No ha's  No floaters  No flashes    Eye meds  None    Hemoglobin A1C       Date                     Value               Ref Range           Status             04/10/2019               7.9 (H)             4.0 - 5.6 %         Final            01/10/2019               8.7 (H)             4.0 - 5.6 %         Final        07/05/2018               7.0 (H)             4.0 - 5.6 %         Final       Assessment/Plan:     1. Type 2 diabetes mellitus without retinopathy  No diabetic retinopathy. Discussed with pt the effects of diabetes on vision, importance of good blood sugar control, compliance with meds, and follow up care with PCP. Return in 1 year for dilated eye exam, sooner PRN.    2. Nuclear sclerosis of both eyes  Educated pt on presence of cataracts and effects on vision. No surgery at this time. Recheck in one year.    3. H/O: stroke with residual effects\  S/p CVA with VA loss. Stable per pt.    4. Refractive errors  Educated patient on refractive error and discussed lens options. Dispensed updated spectacle Rx. Educated about adaptation period to new specs.    Eyeglass Final Rx     Eyeglass Final Rx       Sphere Cylinder Axis Add    Right Balance   +2.75    Left -3.00 +1.50 005 +2.75    Expiration Date:  5/13/2020                  Follow up in about 1 year (around 5/13/2020) for Diabetic Eye Exam.

## 2019-07-15 PROBLEM — Z13.820 SCREENING FOR OSTEOPOROSIS: Status: RESOLVED | Noted: 2019-04-09 | Resolved: 2019-07-15

## 2019-07-19 DIAGNOSIS — E11.9 TYPE 2 DIABETES MELLITUS WITHOUT COMPLICATION: ICD-10-CM

## 2019-07-24 ENCOUNTER — LAB VISIT (OUTPATIENT)
Dept: LAB | Facility: HOSPITAL | Age: 74
End: 2019-07-24
Attending: INTERNAL MEDICINE
Payer: MEDICARE

## 2019-07-24 ENCOUNTER — OFFICE VISIT (OUTPATIENT)
Dept: FAMILY MEDICINE | Facility: CLINIC | Age: 74
End: 2019-07-24
Payer: MEDICARE

## 2019-07-24 VITALS
WEIGHT: 149 LBS | OXYGEN SATURATION: 96 % | BODY MASS INDEX: 26.4 KG/M2 | SYSTOLIC BLOOD PRESSURE: 144 MMHG | HEIGHT: 63 IN | DIASTOLIC BLOOD PRESSURE: 80 MMHG | HEART RATE: 82 BPM | TEMPERATURE: 98 F

## 2019-07-24 DIAGNOSIS — G25.81 RESTLESS LEGS SYNDROME: ICD-10-CM

## 2019-07-24 DIAGNOSIS — E11.9 DIABETES MELLITUS TYPE 2 WITHOUT RETINOPATHY: Primary | ICD-10-CM

## 2019-07-24 DIAGNOSIS — F43.20 ADJUSTMENT DISORDER, UNSPECIFIED TYPE: ICD-10-CM

## 2019-07-24 DIAGNOSIS — E78.2 MIXED HYPERLIPIDEMIA: ICD-10-CM

## 2019-07-24 DIAGNOSIS — I10 ESSENTIAL HYPERTENSION: ICD-10-CM

## 2019-07-24 DIAGNOSIS — E11.9 TYPE 2 DIABETES MELLITUS WITHOUT COMPLICATION: ICD-10-CM

## 2019-07-24 LAB
ESTIMATED AVG GLUCOSE: 194 MG/DL (ref 68–131)
HBA1C MFR BLD HPLC: 8.4 % (ref 4–5.6)

## 2019-07-24 PROCEDURE — 1101F PR PT FALLS ASSESS DOC 0-1 FALLS W/OUT INJ PAST YR: ICD-10-PCS | Mod: CPTII,S$GLB,, | Performed by: INTERNAL MEDICINE

## 2019-07-24 PROCEDURE — 99999 PR PBB SHADOW E&M-EST. PATIENT-LVL III: CPT | Mod: PBBFAC,,, | Performed by: INTERNAL MEDICINE

## 2019-07-24 PROCEDURE — 3045F PR MOST RECENT HEMOGLOBIN A1C LEVEL 7.0-9.0%: CPT | Mod: CPTII,S$GLB,, | Performed by: INTERNAL MEDICINE

## 2019-07-24 PROCEDURE — 99999 PR PBB SHADOW E&M-EST. PATIENT-LVL III: ICD-10-PCS | Mod: PBBFAC,,, | Performed by: INTERNAL MEDICINE

## 2019-07-24 PROCEDURE — 36415 COLL VENOUS BLD VENIPUNCTURE: CPT | Mod: PO

## 2019-07-24 PROCEDURE — 83036 HEMOGLOBIN GLYCOSYLATED A1C: CPT

## 2019-07-24 PROCEDURE — 3077F PR MOST RECENT SYSTOLIC BLOOD PRESSURE >= 140 MM HG: ICD-10-PCS | Mod: CPTII,S$GLB,, | Performed by: INTERNAL MEDICINE

## 2019-07-24 PROCEDURE — 3045F PR MOST RECENT HEMOGLOBIN A1C LEVEL 7.0-9.0%: ICD-10-PCS | Mod: CPTII,S$GLB,, | Performed by: INTERNAL MEDICINE

## 2019-07-24 PROCEDURE — 3079F DIAST BP 80-89 MM HG: CPT | Mod: CPTII,S$GLB,, | Performed by: INTERNAL MEDICINE

## 2019-07-24 PROCEDURE — 3077F SYST BP >= 140 MM HG: CPT | Mod: CPTII,S$GLB,, | Performed by: INTERNAL MEDICINE

## 2019-07-24 PROCEDURE — 99214 PR OFFICE/OUTPT VISIT, EST, LEVL IV, 30-39 MIN: ICD-10-PCS | Mod: S$GLB,,, | Performed by: INTERNAL MEDICINE

## 2019-07-24 PROCEDURE — 3079F PR MOST RECENT DIASTOLIC BLOOD PRESSURE 80-89 MM HG: ICD-10-PCS | Mod: CPTII,S$GLB,, | Performed by: INTERNAL MEDICINE

## 2019-07-24 PROCEDURE — 1101F PT FALLS ASSESS-DOCD LE1/YR: CPT | Mod: CPTII,S$GLB,, | Performed by: INTERNAL MEDICINE

## 2019-07-24 PROCEDURE — 99214 OFFICE O/P EST MOD 30 MIN: CPT | Mod: S$GLB,,, | Performed by: INTERNAL MEDICINE

## 2019-07-24 RX ORDER — HYDROXYZINE HYDROCHLORIDE 10 MG/1
25 TABLET, FILM COATED ORAL 2 TIMES DAILY PRN
Qty: 60 TABLET | Refills: 0 | Status: SHIPPED | OUTPATIENT
Start: 2019-07-24 | End: 2019-10-29 | Stop reason: SDUPTHER

## 2019-07-24 RX ORDER — GLYBURIDE 2.5 MG/1
5 TABLET ORAL DAILY
Qty: 180 TABLET | Refills: 3 | Status: SHIPPED | OUTPATIENT
Start: 2019-07-24 | End: 2019-07-25 | Stop reason: ALTCHOICE

## 2019-07-24 RX ORDER — LOSARTAN POTASSIUM 100 MG/1
100 TABLET ORAL DAILY
Qty: 90 TABLET | Refills: 3 | Status: SHIPPED | OUTPATIENT
Start: 2019-07-24 | End: 2020-09-11 | Stop reason: SDUPTHER

## 2019-07-24 RX ORDER — METOPROLOL SUCCINATE 100 MG/1
100 TABLET, EXTENDED RELEASE ORAL DAILY
Qty: 90 TABLET | Refills: 3 | Status: SHIPPED | OUTPATIENT
Start: 2019-07-24 | End: 2020-08-17

## 2019-07-24 RX ORDER — METFORMIN HYDROCHLORIDE 1000 MG/1
1000 TABLET ORAL 2 TIMES DAILY WITH MEALS
Qty: 180 TABLET | Refills: 3 | Status: SHIPPED | OUTPATIENT
Start: 2019-07-24 | End: 2019-08-29 | Stop reason: ALTCHOICE

## 2019-07-24 RX ORDER — LANCETS 33 GAUGE
1 EACH MISCELLANEOUS DAILY
Qty: 30 EACH | Refills: 11 | Status: SHIPPED | OUTPATIENT
Start: 2019-07-24 | End: 2020-10-08 | Stop reason: SDUPTHER

## 2019-07-24 RX ORDER — ROPINIROLE 1 MG/1
1 TABLET, FILM COATED ORAL 3 TIMES DAILY
Qty: 180 TABLET | Refills: 3 | Status: SHIPPED | OUTPATIENT
Start: 2019-07-24 | End: 2020-12-01 | Stop reason: SDUPTHER

## 2019-07-24 RX ORDER — AMLODIPINE BESYLATE 10 MG/1
10 TABLET ORAL DAILY
Qty: 90 TABLET | Refills: 3 | Status: SHIPPED | OUTPATIENT
Start: 2019-07-24 | End: 2020-12-01 | Stop reason: SDUPTHER

## 2019-07-24 RX ORDER — ATORVASTATIN CALCIUM 40 MG/1
40 TABLET, FILM COATED ORAL DAILY
Qty: 90 TABLET | Refills: 3 | Status: SHIPPED | OUTPATIENT
Start: 2019-07-24 | End: 2020-09-08
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
## 2019-07-25 DIAGNOSIS — E11.9 DIABETES MELLITUS TYPE 2 WITHOUT RETINOPATHY: Primary | ICD-10-CM

## 2019-07-25 RX ORDER — GLIPIZIDE 10 MG/1
10 TABLET, FILM COATED, EXTENDED RELEASE ORAL
Qty: 90 TABLET | Refills: 0 | Status: SHIPPED | OUTPATIENT
Start: 2019-07-25 | End: 2019-10-29 | Stop reason: SDUPTHER

## 2019-07-25 NOTE — PROGRESS NOTES
A1c went up.  I would like to see it closer to 7.5.  Please call the patient-I am changing her blood sugar medicine.  Stop the glimepiride.  Start glipizide, extended release, once a day.  Take in the morning.  Keep checking sugars.  If they go below 100 let me know.  Please write down blood sugars and bring with her next appointment.  Follow up in 1 month.

## 2019-07-26 ENCOUNTER — TELEPHONE (OUTPATIENT)
Dept: FAMILY MEDICINE | Facility: CLINIC | Age: 74
End: 2019-07-26

## 2019-07-26 NOTE — TELEPHONE ENCOUNTER
----- Message from Kameron Donato MD sent at 7/25/2019 10:55 AM CDT -----  A1c went up.  I would like to see it closer to 7.5.  Please call the patient-I am changing her blood sugar medicine.  Stop the glimepiride.  Start glipizide, extended release, once a day.  Take in the morning.  Keep checking sugars.  If they go below 100 let me know.  Please write down blood sugars and bring with her next appointment.  Follow up in 1 month.

## 2019-08-14 ENCOUNTER — TELEPHONE (OUTPATIENT)
Dept: FAMILY MEDICINE | Facility: CLINIC | Age: 74
End: 2019-08-14

## 2019-08-14 NOTE — TELEPHONE ENCOUNTER
----- Message from Lisa Arteaga sent at 8/14/2019  1:19 PM CDT -----  Contact: Barry Myers requesting for medical necessity form and clinical notes for diabetic supplies [employer]. Fax # [phone] Phone [phone].

## 2019-08-28 NOTE — PROGRESS NOTES
This note was created by combination of typed  and Dragon dictation.  Transcription errors may be present.  If there are any questions, please contact me.    Assessment / Plan:   Diabetes mellitus type 2 without retinopathy  Type 2 diabetes mellitus without complication, without long-term current use of insulin  -blood sugars got better and now it is getting worse.  Asked her to stop soft drinks.  She may transition to eliminate but I have asked her to diluted as limited as a lot of sugars well.  On glipizide, 1 episode of glucose in the 70s otherwise has been okay.  Change metformin regular release to extended release.  Stay on glipizide.  If she continues to have morning sugars greater than 150 I have asked her to contact me-in which case, might increase the glipizide.  -     metFORMIN (GLUCOPHAGE-XR) 500 MG 24 hr tablet; Take 2 tablets (1,000 mg total) by mouth 2 (two) times daily with meals.  Dispense: 360 tablet; Refill: 3    Medications Discontinued During This Encounter   Medication Reason    metFORMIN (GLUCOPHAGE) 1000 MG tablet Alternate therapy       meds sent this encounter:  Medications Ordered This Encounter   Medications    metFORMIN (GLUCOPHAGE-XR) 500 MG 24 hr tablet     Sig: Take 2 tablets (1,000 mg total) by mouth 2 (two) times daily with meals.     Dispense:  360 tablet     Refill:  3     Stop the regular release metformin       Follow Up: Follow up in about 2 months (around 10/29/2019).  Nonfasting.  Bring in glucose logs for review.      Subjective:     Chief Complaint   Patient presents with    Diabetes       HPI  Ramya is a 73 y.o. female, last appointment with this clinic was 7/24/2019.    No LMP recorded. Patient has had a hysterectomy.    Last seen last month  DM changed glyburide to glipizide XR  HTN uncontrolled. On max dose losartan, amlodipine, mid range dose metoprolol. High stress  Adjustment d/o - her grandson was involved in shooting.  and son had had a stroke.  Trial of hydroxyzine.    a1c 8.4 from 7.9  Add hydralazine if needed.    Brings in glucose logs - had been better for a while but in the past week started going up.  Has been eating fig sandwich in the evenings, not every night. This AM 170s. Yesterday 199.     Ate one last night along with a coca cola. Soft drinks 3 times a week.     Taking the hydroxyzine PRN. Finds it helpful.    Denies chest pain, dyspnea.             Patient Care Team:  Kameron Donato MD as PCP - General (Internal Medicine)    Patient Active Problem List    Diagnosis Date Noted    Microcytic anemia mentzer index < 13, suspect this is underlying thalassemia. normal ferritin 2019    RLS (restless legs syndrome) 2018    Flow murmur, 2018 TTE WNL 2018    Nuclear sclerosis of both eyes 2018    Refractive errors 2018    Diabetes mellitus type 2 without retinopathy 2016    Mixed hyperlipidemia; 2018 prava to atorva 2012    Essential hypertension 2012    Type 2 diabetes mellitus without complication, without long-term current use of insulin 2012    H/O: stroke with residual effects 2012       PAST MEDICAL HISTORY:  Past Medical History:   Diagnosis Date    Diabetes mellitus type II     Hyperlipidemia     Hypertension     Nuclear sclerosis of both eyes 2018    Stroke        PAST SURGICAL HISTORY:  Past Surgical History:   Procedure Laterality Date    4 MISCARRIAGES       8 PARA 4      HYSTERECTOMY      laser right eye  Right ? yrs    pt had stroke and tried to bring vision back        SOCIAL HISTORY:  Social History     Socioeconomic History    Marital status:      Spouse name: Not on file    Number of children: Not on file    Years of education: Not on file    Highest education level: Not on file   Occupational History    Not on file   Social Needs    Financial resource strain: Not on file    Food insecurity:     Worry: Not on file      Inability: Not on file    Transportation needs:     Medical: Not on file     Non-medical: Not on file   Tobacco Use    Smoking status: Never Smoker    Smokeless tobacco: Never Used   Substance and Sexual Activity    Alcohol use: No    Drug use: No    Sexual activity: Not on file   Lifestyle    Physical activity:     Days per week: Not on file     Minutes per session: Not on file    Stress: Not on file   Relationships    Social connections:     Talks on phone: Not on file     Gets together: Not on file     Attends Hinduism service: Not on file     Active member of club or organization: Not on file     Attends meetings of clubs or organizations: Not on file     Relationship status: Not on file   Other Topics Concern    Not on file   Social History Narrative    Lives in [place].    2014.          ALLERGIES AND MEDICATIONS: updated and reviewed.  Review of patient's allergies indicates:   Allergen Reactions    Citalopram analogues Other (See Comments)     Hallucinations     Current Outpatient Medications   Medication Sig Dispense Refill    amLODIPine (NORVASC) 10 MG tablet Take 1 tablet (10 mg total) by mouth once daily. 90 tablet 3    aspirin 325 MG tablet Take 325 mg by mouth. 1 Tablet Oral Every day      atorvastatin (LIPITOR) 40 MG tablet Take 1 tablet (40 mg total) by mouth once daily. 90 tablet 3    blood sugar diagnostic (TRUE METRIX GLUCOSE TEST STRIP) Strp Daily glucose testing; whichever brand covered by insurance. 100 strip 11    glipiZIDE (GLUCOTROL) 10 MG TR24 Take 1 tablet (10 mg total) by mouth daily with breakfast. Stop glyburide 90 tablet 0    hydrOXYzine HCl (ATARAX) 10 MG Tab Take 3 tablets (30 mg total) by mouth 2 (two) times daily as needed. 60 tablet 0    losartan (COZAAR) 100 MG tablet Take 1 tablet (100 mg total) by mouth once daily. 90 tablet 3    metFORMIN (GLUCOPHAGE) 1000 MG tablet Take 1 tablet (1,000 mg total) by mouth 2 (two) times daily with  "meals. 180 tablet 3    metoprolol succinate (TOPROL-XL) 100 MG 24 hr tablet Take 1 tablet (100 mg total) by mouth once daily. 90 tablet 3    multivit & mins-ferrous glucon (CENTRUM) 9 mg/15 mL iron Liqd Take by mouth. 1 Liquid Oral Every day      rOPINIRole (REQUIP) 1 MG tablet Take 1 tablet (1 mg total) by mouth 3 (three) times daily. 180 tablet 3    traZODone (DESYREL) 50 MG tablet 1/2 to 1 tablet, 1 hour prior to bedtime, as needed for sleep 30 tablet 11    lancets (ONETOUCH DELICA LANCETS) 33 gauge Misc 1 lancet by Misc.(Non-Drug; Combo Route) route once daily. ONCE DAILY BLOOD SUGAR TESTING; WHICHEVER BRAND COVERED BY INSURANCE 30 each 11    triamcinolone acetonide 0.1% (KENALOG) 0.1 % cream Apply topically 2 (two) times daily. 45 g 5     No current facility-administered medications for this visit.        ROS    Objective:   Physical Exam   Vitals:    08/29/19 1322 08/29/19 1336   BP: (!) 152/64 138/70   BP Location: Right arm Left arm   Patient Position: Sitting Sitting   BP Method: Medium (Automatic) Medium (Manual)   Pulse: 62    Temp: 98 °F (36.7 °C)    TempSrc: Oral    SpO2: 98%    Weight: 67.6 kg (149 lb)    Height: 5' 3" (1.6 m)     Body mass index is 26.39 kg/m².  Weight: 67.6 kg (149 lb)   Height: 5' 3" (160 cm)     Physical Exam   Constitutional: She is oriented to person, place, and time. She appears well-developed and well-nourished. No distress.   Eyes: EOM are normal.   Cardiovascular: Normal rate and regular rhythm.   Murmur heard.  2/6 systolic murmur S1 S2   Pulmonary/Chest: Effort normal and breath sounds normal.   Musculoskeletal: Normal range of motion.   Neurological: She is alert and oriented to person, place, and time. Coordination normal.   Skin: Skin is warm and dry.   Psychiatric: She has a normal mood and affect. Her behavior is normal. Thought content normal.     "

## 2019-08-29 ENCOUNTER — OFFICE VISIT (OUTPATIENT)
Dept: FAMILY MEDICINE | Facility: CLINIC | Age: 74
End: 2019-08-29
Payer: MEDICARE

## 2019-08-29 VITALS
WEIGHT: 149 LBS | HEIGHT: 63 IN | SYSTOLIC BLOOD PRESSURE: 138 MMHG | OXYGEN SATURATION: 98 % | HEART RATE: 62 BPM | TEMPERATURE: 98 F | DIASTOLIC BLOOD PRESSURE: 70 MMHG | BODY MASS INDEX: 26.4 KG/M2

## 2019-08-29 DIAGNOSIS — E11.9 TYPE 2 DIABETES MELLITUS WITHOUT COMPLICATION, WITHOUT LONG-TERM CURRENT USE OF INSULIN: ICD-10-CM

## 2019-08-29 DIAGNOSIS — E11.9 DIABETES MELLITUS TYPE 2 WITHOUT RETINOPATHY: Primary | ICD-10-CM

## 2019-08-29 PROCEDURE — 99214 OFFICE O/P EST MOD 30 MIN: CPT | Mod: S$GLB,,, | Performed by: INTERNAL MEDICINE

## 2019-08-29 PROCEDURE — 3078F PR MOST RECENT DIASTOLIC BLOOD PRESSURE < 80 MM HG: ICD-10-PCS | Mod: CPTII,S$GLB,, | Performed by: INTERNAL MEDICINE

## 2019-08-29 PROCEDURE — 3045F PR MOST RECENT HEMOGLOBIN A1C LEVEL 7.0-9.0%: ICD-10-PCS | Mod: CPTII,S$GLB,, | Performed by: INTERNAL MEDICINE

## 2019-08-29 PROCEDURE — 1101F PT FALLS ASSESS-DOCD LE1/YR: CPT | Mod: CPTII,S$GLB,, | Performed by: INTERNAL MEDICINE

## 2019-08-29 PROCEDURE — 3075F PR MOST RECENT SYSTOLIC BLOOD PRESS GE 130-139MM HG: ICD-10-PCS | Mod: CPTII,S$GLB,, | Performed by: INTERNAL MEDICINE

## 2019-08-29 PROCEDURE — 99999 PR PBB SHADOW E&M-EST. PATIENT-LVL IV: CPT | Mod: PBBFAC,,, | Performed by: INTERNAL MEDICINE

## 2019-08-29 PROCEDURE — 99214 PR OFFICE/OUTPT VISIT, EST, LEVL IV, 30-39 MIN: ICD-10-PCS | Mod: S$GLB,,, | Performed by: INTERNAL MEDICINE

## 2019-08-29 PROCEDURE — 99999 PR PBB SHADOW E&M-EST. PATIENT-LVL IV: ICD-10-PCS | Mod: PBBFAC,,, | Performed by: INTERNAL MEDICINE

## 2019-08-29 PROCEDURE — 3045F PR MOST RECENT HEMOGLOBIN A1C LEVEL 7.0-9.0%: CPT | Mod: CPTII,S$GLB,, | Performed by: INTERNAL MEDICINE

## 2019-08-29 PROCEDURE — 1101F PR PT FALLS ASSESS DOC 0-1 FALLS W/OUT INJ PAST YR: ICD-10-PCS | Mod: CPTII,S$GLB,, | Performed by: INTERNAL MEDICINE

## 2019-08-29 PROCEDURE — 3078F DIAST BP <80 MM HG: CPT | Mod: CPTII,S$GLB,, | Performed by: INTERNAL MEDICINE

## 2019-08-29 PROCEDURE — 99499 UNLISTED E&M SERVICE: CPT | Mod: S$GLB,,, | Performed by: INTERNAL MEDICINE

## 2019-08-29 PROCEDURE — 99499 RISK ADDL DX/OHS AUDIT: ICD-10-PCS | Mod: S$GLB,,, | Performed by: INTERNAL MEDICINE

## 2019-08-29 PROCEDURE — 3075F SYST BP GE 130 - 139MM HG: CPT | Mod: CPTII,S$GLB,, | Performed by: INTERNAL MEDICINE

## 2019-08-29 RX ORDER — METFORMIN HYDROCHLORIDE 500 MG/1
1000 TABLET, EXTENDED RELEASE ORAL 2 TIMES DAILY WITH MEALS
Qty: 360 TABLET | Refills: 3 | Status: SHIPPED | OUTPATIENT
Start: 2019-08-29 | End: 2020-12-01 | Stop reason: SDUPTHER

## 2019-08-29 NOTE — PATIENT INSTRUCTIONS
If your morning sugars stay more than 150 for 7 days - please call me - will need to make further changes.

## 2019-10-28 NOTE — PROGRESS NOTES
This note was created by combination of typed  and M-Modal dictation.  Transcription errors may be present.  If there are any questions, please contact me.    Assessment / Plan:   Type 2 diabetes mellitus without complication, without long-term current use of insulin  -she reports general compliance with metformin and glipizide.  Check A1c today.  Goal is less than 7.5  -     Comprehensive metabolic panel; Future; Expected date: 10/29/2019  -     Hemoglobin A1c; Future; Expected date: 10/29/2019  -     Cancel: Lipid panel; Future; Expected date: 10/29/2019  -     CBC auto differential; Future; Expected date: 10/29/2019  -     glipiZIDE (GLUCOTROL) 10 MG TR24; Take 1 tablet (10 mg total) by mouth daily with breakfast. Stop glyburide  Dispense: 90 tablet; Refill: 3      Mixed hyperlipidemia; 7/2018 prava to atorva  H/O: stroke with residual effects  -last lipid profile good nonfasting no check today. BP OK.    Microcytic anemia mentzer index < 13, suspect this is underlying thalassemia. normal ferritin  -stable    Needs flu shot  -flu shot given today.  -     Influenza - High Dose (65+) (PF) (IM)    Essential hypertension  -stable continue to monitor. Consider HCTZ if not controlled; borderline today.    Flow murmur, 6/2018 TTE WNL  -stable.    Adjustment disorder, unspecified type  -uses trazodone for sleep.  Refilled.  -     Discontinue: hydrOXYzine HCl (ATARAX) 10 MG Tab; Take 1 tablet (10 mg total) by mouth nightly as needed.  Dispense: 30 tablet; Refill: 5  -     traZODone (DESYREL) 50 MG tablet; Take 0.5 tablets (25 mg total) by mouth every evening.  Dispense: 30 tablet; Refill: 11    Medications Discontinued During This Encounter   Medication Reason    glipiZIDE (GLUCOTROL) 10 MG TR24 Reorder    hydrOXYzine HCl (ATARAX) 10 MG Tab Reorder    traZODone (DESYREL) 50 MG tablet     hydrOXYzine HCl (ATARAX) 10 MG Tab Therapy completed       meds sent this encounter:  Medications Ordered This Encounter  "  Medications    glipiZIDE (GLUCOTROL) 10 MG TR24     Sig: Take 1 tablet (10 mg total) by mouth daily with breakfast. Stop glyburide     Dispense:  90 tablet     Refill:  3     Pharmacy stop glyburide    traZODone (DESYREL) 50 MG tablet     Sig: Take 0.5 tablets (25 mg total) by mouth every evening.     Dispense:  30 tablet     Refill:  11       Follow Up: No follow-ups on file. OV 6 months previsit labs ordered.      Subjective:     Chief Complaint   Patient presents with    Follow-up     2 month f/u       HPI  Ramya is a 73 y.o. female, last appointment with this clinic was 8/29/2019.    No LMP recorded. Patient has had a hysterectomy.    Last seen in late August.  Diabetes, control was worsening.  She needed to stop soft drinks  I changed metformin from regular release to extended release.  Stay on glipizide.  Changed to glipizide XR. Has room to increase if necessary.  BP that visit was borderline.  On max dose losartan, mid range dose Toprol XL  High stress, trial of hydroxyzine.    Consider hctz    She filled metformin extended release late August  Glipizide last refill just at 3 months  Filling amlodipine; losartan; metoprolol  Filling atorvastatin    One of her children - unemployed.  The other is doing ok - employed.     BP good on intake. Home checks. "Usually good", but systolic she recalls 150s. diastolic 60-70s    Glucose checks in the AM. Typically 130-140.  Reports she is taking her meds regularly. But she went on a trip, forgot her meds for a week, and restarted it.     Declines mammogram. No pain no masses. No discharge.     Declines shingrix today.  Took flu shot today.     Requesting RF on trazodone for sleep; 1/2 tablet at night.    Patient Care Team:  Kameron Donato MD as PCP - General (Internal Medicine)  Melina Parmar MA as Care Coordinator    Patient Active Problem List    Diagnosis Date Noted    Microcytic anemia mentzer index < 13, suspect this is underlying thalassemia. " normal ferritin 2019    RLS (restless legs syndrome) 2018    Flow murmur, 2018 TTE WNL 2018    Nuclear sclerosis of both eyes 2018    Refractive errors 2018    Diabetes mellitus type 2 without retinopathy 2016    Mixed hyperlipidemia; 2018 prava to atorva 2012    Essential hypertension 2012    Type 2 diabetes mellitus without complication, without long-term current use of insulin 2012    H/O: stroke with residual effects 2012       PAST MEDICAL HISTORY:  Past Medical History:   Diagnosis Date    Diabetes mellitus type II     Hyperlipidemia     Hypertension     Nuclear sclerosis of both eyes 2018    Stroke        PAST SURGICAL HISTORY:  Past Surgical History:   Procedure Laterality Date    4 MISCARRIAGES       8 PARA 4      HYSTERECTOMY      laser right eye  Right ? yrs    pt had stroke and tried to bring vision back        SOCIAL HISTORY:  Social History     Socioeconomic History    Marital status:      Spouse name: Not on file    Number of children: Not on file    Years of education: Not on file    Highest education level: Not on file   Occupational History    Not on file   Social Needs    Financial resource strain: Not on file    Food insecurity:     Worry: Not on file     Inability: Not on file    Transportation needs:     Medical: Not on file     Non-medical: Not on file   Tobacco Use    Smoking status: Never Smoker    Smokeless tobacco: Never Used   Substance and Sexual Activity    Alcohol use: No    Drug use: No    Sexual activity: Not on file   Lifestyle    Physical activity:     Days per week: Not on file     Minutes per session: Not on file    Stress: Not on file   Relationships    Social connections:     Talks on phone: Not on file     Gets together: Not on file     Attends Spiritism service: Not on file     Active member of club or organization: Not on file     Attends meetings of clubs or  organizations: Not on file     Relationship status: Not on file   Other Topics Concern    Not on file   Social History Narrative    Lives in [place].    2014.          ALLERGIES AND MEDICATIONS: updated and reviewed.  Review of patient's allergies indicates:   Allergen Reactions    Citalopram analogues Other (See Comments)     Hallucinations     Current Outpatient Medications   Medication Sig Dispense Refill    amLODIPine (NORVASC) 10 MG tablet Take 1 tablet (10 mg total) by mouth once daily. 90 tablet 3    aspirin 325 MG tablet Take 325 mg by mouth. 1 Tablet Oral Every day      atorvastatin (LIPITOR) 40 MG tablet Take 1 tablet (40 mg total) by mouth once daily. 90 tablet 3    blood sugar diagnostic (TRUE METRIX GLUCOSE TEST STRIP) Strp Daily glucose testing; whichever brand covered by insurance. 100 strip 11    glipiZIDE (GLUCOTROL) 10 MG TR24 Take 1 tablet (10 mg total) by mouth daily with breakfast. Stop glyburide 90 tablet 0    hydrOXYzine HCl (ATARAX) 10 MG Tab Take 3 tablets (30 mg total) by mouth 2 (two) times daily as needed. 60 tablet 0    losartan (COZAAR) 100 MG tablet Take 1 tablet (100 mg total) by mouth once daily. 90 tablet 3    metFORMIN (GLUCOPHAGE-XR) 500 MG 24 hr tablet Take 2 tablets (1,000 mg total) by mouth 2 (two) times daily with meals. 360 tablet 3    metoprolol succinate (TOPROL-XL) 100 MG 24 hr tablet Take 1 tablet (100 mg total) by mouth once daily. 90 tablet 3    multivit & mins-ferrous glucon (CENTRUM) 9 mg/15 mL iron Liqd Take by mouth. 1 Liquid Oral Every day      rOPINIRole (REQUIP) 1 MG tablet Take 1 tablet (1 mg total) by mouth 3 (three) times daily. 180 tablet 3    traZODone (DESYREL) 50 MG tablet 1/2 to 1 tablet, 1 hour prior to bedtime, as needed for sleep 30 tablet 11    lancets (ONETOUCH DELICA LANCETS) 33 gauge Misc 1 lancet by Misc.(Non-Drug; Combo Route) route once daily. ONCE DAILY BLOOD SUGAR TESTING; WHICHEVER BRAND COVERED BY  "INSURANCE 30 each 11    triamcinolone acetonide 0.1% (KENALOG) 0.1 % cream Apply topically 2 (two) times daily. 45 g 5     No current facility-administered medications for this visit.        Review of Systems   Constitutional: Negative for chills and fever.   Respiratory: Negative for shortness of breath.    Cardiovascular: Negative for chest pain.   Gastrointestinal: Negative for abdominal pain.   Genitourinary: Negative for dysuria.       Objective:   Physical Exam   Vitals:    10/29/19 1420   BP: 124/60   Pulse: 78   Temp: 98.3 °F (36.8 °C)   TempSrc: Oral   SpO2: 98%   Weight: 67.8 kg (149 lb 5.8 oz)   Height: 5' 3" (1.6 m)    Body mass index is 26.46 kg/m².  Weight: 67.8 kg (149 lb 5.8 oz)   Height: 5' 3" (160 cm)     Physical Exam   Constitutional: She is oriented to person, place, and time. She appears well-developed and well-nourished. No distress.   Eyes: EOM are normal.   Cardiovascular: Normal rate and regular rhythm.   No murmur heard.  2/6 systolic murmur nonradiating   Pulmonary/Chest: Effort normal and breath sounds normal.   Musculoskeletal: Normal range of motion.   Neurological: She is alert and oriented to person, place, and time. Coordination normal.   Skin: Skin is warm and dry.   Psychiatric: She has a normal mood and affect. Her behavior is normal. Thought content normal.     "

## 2019-10-29 ENCOUNTER — LAB VISIT (OUTPATIENT)
Dept: LAB | Facility: HOSPITAL | Age: 74
End: 2019-10-29
Attending: INTERNAL MEDICINE
Payer: MEDICARE

## 2019-10-29 ENCOUNTER — OFFICE VISIT (OUTPATIENT)
Dept: FAMILY MEDICINE | Facility: CLINIC | Age: 74
End: 2019-10-29
Payer: MEDICARE

## 2019-10-29 VITALS
BODY MASS INDEX: 26.47 KG/M2 | WEIGHT: 149.38 LBS | OXYGEN SATURATION: 98 % | TEMPERATURE: 98 F | DIASTOLIC BLOOD PRESSURE: 60 MMHG | HEART RATE: 78 BPM | HEIGHT: 63 IN | SYSTOLIC BLOOD PRESSURE: 138 MMHG

## 2019-10-29 DIAGNOSIS — Z23 NEEDS FLU SHOT: ICD-10-CM

## 2019-10-29 DIAGNOSIS — I10 ESSENTIAL HYPERTENSION: ICD-10-CM

## 2019-10-29 DIAGNOSIS — D50.9 MICROCYTIC ANEMIA: ICD-10-CM

## 2019-10-29 DIAGNOSIS — E78.2 MIXED HYPERLIPIDEMIA: ICD-10-CM

## 2019-10-29 DIAGNOSIS — I69.30 H/O: STROKE WITH RESIDUAL EFFECTS: ICD-10-CM

## 2019-10-29 DIAGNOSIS — E11.9 DIABETES MELLITUS TYPE 2 WITHOUT RETINOPATHY: ICD-10-CM

## 2019-10-29 DIAGNOSIS — R01.1 FLOW MURMUR: ICD-10-CM

## 2019-10-29 DIAGNOSIS — E11.9 TYPE 2 DIABETES MELLITUS WITHOUT COMPLICATION, WITHOUT LONG-TERM CURRENT USE OF INSULIN: Primary | ICD-10-CM

## 2019-10-29 DIAGNOSIS — E11.9 TYPE 2 DIABETES MELLITUS WITHOUT COMPLICATION, WITHOUT LONG-TERM CURRENT USE OF INSULIN: ICD-10-CM

## 2019-10-29 DIAGNOSIS — F43.20 ADJUSTMENT DISORDER, UNSPECIFIED TYPE: ICD-10-CM

## 2019-10-29 LAB
ALBUMIN SERPL BCP-MCNC: 4.1 G/DL (ref 3.5–5.2)
ALBUMIN SERPL BCP-MCNC: 4.1 G/DL (ref 3.5–5.2)
ALP SERPL-CCNC: 158 U/L (ref 55–135)
ALP SERPL-CCNC: 158 U/L (ref 55–135)
ALT SERPL W/O P-5'-P-CCNC: 37 U/L (ref 10–44)
ALT SERPL W/O P-5'-P-CCNC: 37 U/L (ref 10–44)
ANION GAP SERPL CALC-SCNC: 11 MMOL/L (ref 8–16)
ANION GAP SERPL CALC-SCNC: 11 MMOL/L (ref 8–16)
AST SERPL-CCNC: 42 U/L (ref 10–40)
AST SERPL-CCNC: 42 U/L (ref 10–40)
BASOPHILS # BLD AUTO: 0.09 K/UL (ref 0–0.2)
BASOPHILS # BLD AUTO: 0.09 K/UL (ref 0–0.2)
BASOPHILS NFR BLD: 1.3 % (ref 0–1.9)
BASOPHILS NFR BLD: 1.3 % (ref 0–1.9)
BILIRUB SERPL-MCNC: 0.7 MG/DL (ref 0.1–1)
BILIRUB SERPL-MCNC: 0.7 MG/DL (ref 0.1–1)
BUN SERPL-MCNC: 14 MG/DL (ref 8–23)
BUN SERPL-MCNC: 14 MG/DL (ref 8–23)
CALCIUM SERPL-MCNC: 10 MG/DL (ref 8.7–10.5)
CALCIUM SERPL-MCNC: 10 MG/DL (ref 8.7–10.5)
CHLORIDE SERPL-SCNC: 104 MMOL/L (ref 95–110)
CHLORIDE SERPL-SCNC: 104 MMOL/L (ref 95–110)
CHOLEST SERPL-MCNC: 146 MG/DL (ref 120–199)
CHOLEST/HDLC SERPL: 3 {RATIO} (ref 2–5)
CO2 SERPL-SCNC: 27 MMOL/L (ref 23–29)
CO2 SERPL-SCNC: 27 MMOL/L (ref 23–29)
CREAT SERPL-MCNC: 0.9 MG/DL (ref 0.5–1.4)
CREAT SERPL-MCNC: 0.9 MG/DL (ref 0.5–1.4)
DIFFERENTIAL METHOD: ABNORMAL
DIFFERENTIAL METHOD: ABNORMAL
EOSINOPHIL # BLD AUTO: 0.1 K/UL (ref 0–0.5)
EOSINOPHIL # BLD AUTO: 0.1 K/UL (ref 0–0.5)
EOSINOPHIL NFR BLD: 1.4 % (ref 0–8)
EOSINOPHIL NFR BLD: 1.4 % (ref 0–8)
ERYTHROCYTE [DISTWIDTH] IN BLOOD BY AUTOMATED COUNT: 14.6 % (ref 11.5–14.5)
ERYTHROCYTE [DISTWIDTH] IN BLOOD BY AUTOMATED COUNT: 14.6 % (ref 11.5–14.5)
EST. GFR  (AFRICAN AMERICAN): >60 ML/MIN/1.73 M^2
EST. GFR  (AFRICAN AMERICAN): >60 ML/MIN/1.73 M^2
EST. GFR  (NON AFRICAN AMERICAN): >60 ML/MIN/1.73 M^2
EST. GFR  (NON AFRICAN AMERICAN): >60 ML/MIN/1.73 M^2
ESTIMATED AVG GLUCOSE: 177 MG/DL (ref 68–131)
ESTIMATED AVG GLUCOSE: 177 MG/DL (ref 68–131)
GLUCOSE SERPL-MCNC: 174 MG/DL (ref 70–110)
GLUCOSE SERPL-MCNC: 174 MG/DL (ref 70–110)
HBA1C MFR BLD HPLC: 7.8 % (ref 4–5.6)
HBA1C MFR BLD HPLC: 7.8 % (ref 4–5.6)
HCT VFR BLD AUTO: 42.1 % (ref 37–48.5)
HCT VFR BLD AUTO: 42.1 % (ref 37–48.5)
HDLC SERPL-MCNC: 49 MG/DL (ref 40–75)
HDLC SERPL: 33.6 % (ref 20–50)
HGB BLD-MCNC: 12 G/DL (ref 12–16)
HGB BLD-MCNC: 12 G/DL (ref 12–16)
IMM GRANULOCYTES # BLD AUTO: 0.01 K/UL (ref 0–0.04)
IMM GRANULOCYTES # BLD AUTO: 0.01 K/UL (ref 0–0.04)
IMM GRANULOCYTES NFR BLD AUTO: 0.1 % (ref 0–0.5)
IMM GRANULOCYTES NFR BLD AUTO: 0.1 % (ref 0–0.5)
LDLC SERPL CALC-MCNC: 60.6 MG/DL (ref 63–159)
LYMPHOCYTES # BLD AUTO: 3.7 K/UL (ref 1–4.8)
LYMPHOCYTES # BLD AUTO: 3.7 K/UL (ref 1–4.8)
LYMPHOCYTES NFR BLD: 51 % (ref 18–48)
LYMPHOCYTES NFR BLD: 51 % (ref 18–48)
MCH RBC QN AUTO: 20.7 PG (ref 27–31)
MCH RBC QN AUTO: 20.7 PG (ref 27–31)
MCHC RBC AUTO-ENTMCNC: 28.5 G/DL (ref 32–36)
MCHC RBC AUTO-ENTMCNC: 28.5 G/DL (ref 32–36)
MCV RBC AUTO: 73 FL (ref 82–98)
MCV RBC AUTO: 73 FL (ref 82–98)
MONOCYTES # BLD AUTO: 0.5 K/UL (ref 0.3–1)
MONOCYTES # BLD AUTO: 0.5 K/UL (ref 0.3–1)
MONOCYTES NFR BLD: 6.3 % (ref 4–15)
MONOCYTES NFR BLD: 6.3 % (ref 4–15)
NEUTROPHILS # BLD AUTO: 2.9 K/UL (ref 1.8–7.7)
NEUTROPHILS # BLD AUTO: 2.9 K/UL (ref 1.8–7.7)
NEUTROPHILS NFR BLD: 39.9 % (ref 38–73)
NEUTROPHILS NFR BLD: 39.9 % (ref 38–73)
NONHDLC SERPL-MCNC: 97 MG/DL
NRBC BLD-RTO: 0 /100 WBC
NRBC BLD-RTO: 0 /100 WBC
PLATELET # BLD AUTO: 324 K/UL (ref 150–350)
PLATELET # BLD AUTO: 324 K/UL (ref 150–350)
PMV BLD AUTO: 10.4 FL (ref 9.2–12.9)
PMV BLD AUTO: 10.4 FL (ref 9.2–12.9)
POTASSIUM SERPL-SCNC: 4.3 MMOL/L (ref 3.5–5.1)
POTASSIUM SERPL-SCNC: 4.3 MMOL/L (ref 3.5–5.1)
PROT SERPL-MCNC: 8.4 G/DL (ref 6–8.4)
PROT SERPL-MCNC: 8.4 G/DL (ref 6–8.4)
RBC # BLD AUTO: 5.79 M/UL (ref 4–5.4)
RBC # BLD AUTO: 5.79 M/UL (ref 4–5.4)
SODIUM SERPL-SCNC: 142 MMOL/L (ref 136–145)
SODIUM SERPL-SCNC: 142 MMOL/L (ref 136–145)
TRIGL SERPL-MCNC: 182 MG/DL (ref 30–150)
WBC # BLD AUTO: 7.17 K/UL (ref 3.9–12.7)
WBC # BLD AUTO: 7.17 K/UL (ref 3.9–12.7)

## 2019-10-29 PROCEDURE — 36415 COLL VENOUS BLD VENIPUNCTURE: CPT | Mod: PO

## 2019-10-29 PROCEDURE — 3078F DIAST BP <80 MM HG: CPT | Mod: CPTII,S$GLB,, | Performed by: INTERNAL MEDICINE

## 2019-10-29 PROCEDURE — 99499 RISK ADDL DX/OHS AUDIT: ICD-10-PCS | Mod: ,,, | Performed by: INTERNAL MEDICINE

## 2019-10-29 PROCEDURE — 3075F SYST BP GE 130 - 139MM HG: CPT | Mod: CPTII,S$GLB,, | Performed by: INTERNAL MEDICINE

## 2019-10-29 PROCEDURE — 99214 PR OFFICE/OUTPT VISIT, EST, LEVL IV, 30-39 MIN: ICD-10-PCS | Mod: 25,S$GLB,, | Performed by: INTERNAL MEDICINE

## 2019-10-29 PROCEDURE — 99999 PR PBB SHADOW E&M-EST. PATIENT-LVL III: ICD-10-PCS | Mod: PBBFAC,,, | Performed by: INTERNAL MEDICINE

## 2019-10-29 PROCEDURE — 90662 FLU VACCINE - HIGH DOSE (65+) PRESERVATIVE FREE IM: ICD-10-PCS | Mod: S$GLB,,, | Performed by: INTERNAL MEDICINE

## 2019-10-29 PROCEDURE — G0008 FLU VACCINE - HIGH DOSE (65+) PRESERVATIVE FREE IM: ICD-10-PCS | Mod: S$GLB,,, | Performed by: INTERNAL MEDICINE

## 2019-10-29 PROCEDURE — 3078F PR MOST RECENT DIASTOLIC BLOOD PRESSURE < 80 MM HG: ICD-10-PCS | Mod: CPTII,S$GLB,, | Performed by: INTERNAL MEDICINE

## 2019-10-29 PROCEDURE — 85025 COMPLETE CBC W/AUTO DIFF WBC: CPT

## 2019-10-29 PROCEDURE — 99499 UNLISTED E&M SERVICE: CPT | Mod: S$GLB,,, | Performed by: INTERNAL MEDICINE

## 2019-10-29 PROCEDURE — 1101F PT FALLS ASSESS-DOCD LE1/YR: CPT | Mod: CPTII,S$GLB,, | Performed by: INTERNAL MEDICINE

## 2019-10-29 PROCEDURE — 99999 PR PBB SHADOW E&M-EST. PATIENT-LVL III: CPT | Mod: PBBFAC,,, | Performed by: INTERNAL MEDICINE

## 2019-10-29 PROCEDURE — G0008 ADMIN INFLUENZA VIRUS VAC: HCPCS | Mod: S$GLB,,, | Performed by: INTERNAL MEDICINE

## 2019-10-29 PROCEDURE — 99499 UNLISTED E&M SERVICE: CPT | Mod: ,,, | Performed by: INTERNAL MEDICINE

## 2019-10-29 PROCEDURE — 90662 IIV NO PRSV INCREASED AG IM: CPT | Mod: S$GLB,,, | Performed by: INTERNAL MEDICINE

## 2019-10-29 PROCEDURE — 1101F PR PT FALLS ASSESS DOC 0-1 FALLS W/OUT INJ PAST YR: ICD-10-PCS | Mod: CPTII,S$GLB,, | Performed by: INTERNAL MEDICINE

## 2019-10-29 PROCEDURE — 80053 COMPREHEN METABOLIC PANEL: CPT

## 2019-10-29 PROCEDURE — 99214 OFFICE O/P EST MOD 30 MIN: CPT | Mod: 25,S$GLB,, | Performed by: INTERNAL MEDICINE

## 2019-10-29 PROCEDURE — 3075F PR MOST RECENT SYSTOLIC BLOOD PRESS GE 130-139MM HG: ICD-10-PCS | Mod: CPTII,S$GLB,, | Performed by: INTERNAL MEDICINE

## 2019-10-29 PROCEDURE — 83036 HEMOGLOBIN GLYCOSYLATED A1C: CPT

## 2019-10-29 PROCEDURE — 99499 RISK ADDL DX/OHS AUDIT: ICD-10-PCS | Mod: S$GLB,,, | Performed by: INTERNAL MEDICINE

## 2019-10-29 PROCEDURE — 80061 LIPID PANEL: CPT

## 2019-10-29 RX ORDER — HYDROXYZINE HYDROCHLORIDE 10 MG/1
10 TABLET, FILM COATED ORAL NIGHTLY PRN
Qty: 30 TABLET | Refills: 5 | Status: SHIPPED | OUTPATIENT
Start: 2019-10-29 | End: 2019-10-29 | Stop reason: ALTCHOICE

## 2019-10-29 RX ORDER — GLIPIZIDE 10 MG/1
10 TABLET, FILM COATED, EXTENDED RELEASE ORAL
Qty: 90 TABLET | Refills: 3 | Status: SHIPPED | OUTPATIENT
Start: 2019-10-29 | End: 2020-12-01 | Stop reason: SDUPTHER

## 2019-10-29 RX ORDER — TRAZODONE HYDROCHLORIDE 50 MG/1
25 TABLET ORAL NIGHTLY
Qty: 30 TABLET | Refills: 11 | Status: SHIPPED | OUTPATIENT
Start: 2019-10-29 | End: 2022-02-17 | Stop reason: SDUPTHER

## 2019-10-30 ENCOUNTER — TELEPHONE (OUTPATIENT)
Dept: FAMILY MEDICINE | Facility: CLINIC | Age: 74
End: 2019-10-30

## 2019-10-30 DIAGNOSIS — E11.9 DIABETES MELLITUS TYPE 2 WITHOUT RETINOPATHY: Primary | ICD-10-CM

## 2019-10-30 NOTE — TELEPHONE ENCOUNTER
Please call pt - a1c (diabetes test) was better compared to before but still high. Would add on new medicine januvia.  Keep taking all other medicines.    Follow up 3 months, previsit labs ordered.

## 2019-10-30 NOTE — PROGRESS NOTES
CBC macrocytosis stable seen previously.  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    On max dose metformin.  I would be wary of increasing the glipizide.  would add on januvia.

## 2020-01-02 ENCOUNTER — TELEPHONE (OUTPATIENT)
Dept: FAMILY MEDICINE | Facility: CLINIC | Age: 75
End: 2020-01-02

## 2020-01-02 DIAGNOSIS — E11.9 TYPE 2 DIABETES MELLITUS WITHOUT COMPLICATION, WITHOUT LONG-TERM CURRENT USE OF INSULIN: Primary | ICD-10-CM

## 2020-01-02 RX ORDER — PIOGLITAZONEHYDROCHLORIDE 15 MG/1
15 TABLET ORAL DAILY
Qty: 90 TABLET | Refills: 0 | Status: SHIPPED | OUTPATIENT
Start: 2020-01-02 | End: 2020-04-15 | Stop reason: SDUPTHER

## 2020-01-02 NOTE — TELEPHONE ENCOUNTER
----- Message from Naila Newberry sent at 1/2/2020  9:53 AM CST -----  Contact: Self  Type: Patient Call Back    Who called: Ramya     What is the request in detail:Patient called to discuss if Januvia has an alternative. Please call to advise    Can the clinic reply by [facility]?    Would the patient rather a call back or a response via [facility]? Call    Best call back number:[phone]

## 2020-01-02 NOTE — TELEPHONE ENCOUNTER
There is not an exact substitute for januvia - it's tier 3 and similar medicines are tier 3    There is a lower tier medicine - actos (pioglitazone) - but it can cause swelling and weight gain.      Is the januvia too expensive?  If unaffordable, would consider actos.

## 2020-01-02 NOTE — TELEPHONE ENCOUNTER
Patient was advised of message below. Stated that januvia is an dollar per pill and was too expensive for her. Willing to try Actos instead.

## 2020-01-10 ENCOUNTER — LAB VISIT (OUTPATIENT)
Dept: LAB | Facility: HOSPITAL | Age: 75
End: 2020-01-10
Attending: INTERNAL MEDICINE
Payer: MEDICARE

## 2020-01-10 DIAGNOSIS — E11.9 DIABETES MELLITUS TYPE 2 WITHOUT RETINOPATHY: ICD-10-CM

## 2020-01-10 LAB
ALBUMIN SERPL BCP-MCNC: 4.2 G/DL (ref 3.5–5.2)
ALP SERPL-CCNC: 136 U/L (ref 55–135)
ALT SERPL W/O P-5'-P-CCNC: 36 U/L (ref 10–44)
ANION GAP SERPL CALC-SCNC: 11 MMOL/L (ref 8–16)
AST SERPL-CCNC: 39 U/L (ref 10–40)
BILIRUB SERPL-MCNC: 0.7 MG/DL (ref 0.1–1)
BUN SERPL-MCNC: 19 MG/DL (ref 8–23)
CALCIUM SERPL-MCNC: 9.9 MG/DL (ref 8.7–10.5)
CHLORIDE SERPL-SCNC: 104 MMOL/L (ref 95–110)
CO2 SERPL-SCNC: 25 MMOL/L (ref 23–29)
CREAT SERPL-MCNC: 0.9 MG/DL (ref 0.5–1.4)
EST. GFR  (AFRICAN AMERICAN): >60 ML/MIN/1.73 M^2
EST. GFR  (NON AFRICAN AMERICAN): >60 ML/MIN/1.73 M^2
GLUCOSE SERPL-MCNC: 115 MG/DL (ref 70–110)
POTASSIUM SERPL-SCNC: 4.8 MMOL/L (ref 3.5–5.1)
PROT SERPL-MCNC: 8.2 G/DL (ref 6–8.4)
SODIUM SERPL-SCNC: 140 MMOL/L (ref 136–145)

## 2020-01-10 PROCEDURE — 36415 COLL VENOUS BLD VENIPUNCTURE: CPT | Mod: PO

## 2020-01-10 PROCEDURE — 83036 HEMOGLOBIN GLYCOSYLATED A1C: CPT

## 2020-01-10 PROCEDURE — 80053 COMPREHEN METABOLIC PANEL: CPT

## 2020-01-11 LAB
ESTIMATED AVG GLUCOSE: 157 MG/DL (ref 68–131)
HBA1C MFR BLD HPLC: 7.1 % (ref 4–5.6)

## 2020-01-24 NOTE — PROGRESS NOTES
This note was created by combination of typed  and M-Modal dictation.  Transcription errors may be present.  If there are any questions, please contact me.    Assessment / Plan:   Type 2 diabetes mellitus without complication, without long-term current use of insulin; januvia expensive;1/2020 actos  Diabetes mellitus type 2 without retinopathy  -only recently did she changed over to Actos.  Her A1c once better but that is primarily from the Januvia.  Januvia was expensive but if necessary her family will assist her in paying for this.  Stay on the Actos for now.  Check future A1c.  If high, revert back to Januvia.  -     Hemoglobin A1c; Future; Expected date: 04/27/2020    Essential hypertension  -stable.    Microcytic anemia mentzer index < 13, suspect this is underlying thalassemia. normal ferritin  -stable    H/O: stroke with residual effects  Mixed hyperlipidemia; 7/2018 prava to atorva  -last lipid in the fall was good on statin.  No changes.    Need for shingles vaccine  -prescriptions sent to pharmacy.  -     varicella-zoster gE-AS01B, PF, (SHINGRIX, PF,) 50 mcg/0.5 mL injection; Inject 0.5 mLs into the muscle once. Repeat in 2 months for 1 dose  Dispense: 0.5 mL; Refill: 1          There are no discontinued medications.    meds sent this encounter:  Medications Ordered This Encounter   Medications    varicella-zoster gE-AS01B, PF, (SHINGRIX, PF,) 50 mcg/0.5 mL injection     Sig: Inject 0.5 mLs into the muscle once. Repeat in 2 months for 1 dose     Dispense:  0.5 mL     Refill:  1       Follow Up: No follow-ups on file.  Lab visit 3 months, if A1c high change Actos Januvia.  Otherwise follow-up office visit 6 months, will need pre visit labs.      Subjective:     Chief Complaint   Patient presents with    Diabetes       HPI  Ramya is a 74 y.o. female, last appointment with this clinic was 10/29/2019.    No LMP recorded. Patient has had a hysterectomy.    Last seen in October.  Diabetes,  hyperlipidemia, I added Januvia but too expensive.  So on actos and glipizide. actos 2-3 weeks.  Feels like the januvia worked better.  Hypertension borderline, consider HCTZ.    Pre visit labs CMP normal except for glucose.  Elevated alkaline phos seen previously.  A1c 7.1 from previous 7.8    10/2019 lipid profile good    I changed her from Januvia to Actos because of expense.  Brand names are all tear 3 with her insurance.  The Actos, and sulfonylurea and metformin are tear 1.  She notes that her blood sugars while on Actos blood sugars in the 150s in the morning.  Much better in the past on Januvia.  I only recently changed her Actos earlier this month.  So her A1c is primarily from the Januvia.  Pt states that if not controlled on actos, her family will assist in paying for januvia, $45 for 30 day rx.    She felt out of sorts after the flu shot.  She was worried that this was a high-dose flu shot as the cause.  However I told her that she has been having the high-dose flu shot for the past several years.      Patient Care Team:  Kameron Donato MD as PCP - General (Internal Medicine)  Melina Parmar MA as Care Coordinator    Patient Active Problem List    Diagnosis Date Noted    Microcytic anemia mentzer index < 13, suspect this is underlying thalassemia. normal ferritin 04/09/2019    RLS (restless legs syndrome) 07/05/2018    Flow murmur, 6/2018 TTE WNL 06/06/2018    Nuclear sclerosis of both eyes 05/11/2018    Refractive errors 05/11/2018    Diabetes mellitus type 2 without retinopathy 09/20/2016    Mixed hyperlipidemia; 7/2018 prava to atorva 09/18/2012    Essential hypertension 09/18/2012    Type 2 diabetes mellitus without complication, without long-term current use of insulin; januvia expensive;1/2020 actos 09/18/2012    H/O: stroke with residual effects 09/18/2012       PAST MEDICAL HISTORY:  Past Medical History:   Diagnosis Date    Diabetes mellitus type II     Hyperlipidemia      Hypertension     Nuclear sclerosis of both eyes 2018    Stroke        PAST SURGICAL HISTORY:  Past Surgical History:   Procedure Laterality Date    4 MISCARRIAGES       8 PARA 4      HYSTERECTOMY      laser right eye  Right ? yrs    pt had stroke and tried to bring vision back        SOCIAL HISTORY:  Social History     Socioeconomic History    Marital status:      Spouse name: Not on file    Number of children: Not on file    Years of education: Not on file    Highest education level: Not on file   Occupational History    Not on file   Social Needs    Financial resource strain: Not on file    Food insecurity:     Worry: Not on file     Inability: Not on file    Transportation needs:     Medical: Not on file     Non-medical: Not on file   Tobacco Use    Smoking status: Never Smoker    Smokeless tobacco: Never Used   Substance and Sexual Activity    Alcohol use: No    Drug use: No    Sexual activity: Not on file   Lifestyle    Physical activity:     Days per week: Not on file     Minutes per session: Not on file    Stress: Not on file   Relationships    Social connections:     Talks on phone: Not on file     Gets together: Not on file     Attends Sabianism service: Not on file     Active member of club or organization: Not on file     Attends meetings of clubs or organizations: Not on file     Relationship status: Not on file   Other Topics Concern    Not on file   Social History Narrative    Lives in [place].    2014.          ALLERGIES AND MEDICATIONS: updated and reviewed.  Review of patient's allergies indicates:   Allergen Reactions    Citalopram analogues Other (See Comments)     Hallucinations     Current Outpatient Medications   Medication Sig Dispense Refill    amLODIPine (NORVASC) 10 MG tablet Take 1 tablet (10 mg total) by mouth once daily. 90 tablet 3    aspirin 325 MG tablet Take 325 mg by mouth. 1 Tablet Oral Every day      atorvastatin  "(LIPITOR) 40 MG tablet Take 1 tablet (40 mg total) by mouth once daily. 90 tablet 3    blood sugar diagnostic (TRUE METRIX GLUCOSE TEST STRIP) Strp Daily glucose testing; whichever brand covered by insurance. 100 strip 11    glipiZIDE (GLUCOTROL) 10 MG TR24 Take 1 tablet (10 mg total) by mouth daily with breakfast. Stop glyburide 90 tablet 3    lancets (ONETOUCH DELICA LANCETS) 33 gauge Misc 1 lancet by Misc.(Non-Drug; Combo Route) route once daily. ONCE DAILY BLOOD SUGAR TESTING; WHICHEVER BRAND COVERED BY INSURANCE 30 each 11    losartan (COZAAR) 100 MG tablet Take 1 tablet (100 mg total) by mouth once daily. 90 tablet 3    metFORMIN (GLUCOPHAGE-XR) 500 MG 24 hr tablet Take 2 tablets (1,000 mg total) by mouth 2 (two) times daily with meals. 360 tablet 3    metoprolol succinate (TOPROL-XL) 100 MG 24 hr tablet Take 1 tablet (100 mg total) by mouth once daily. 90 tablet 3    multivit & mins-ferrous glucon (CENTRUM) 9 mg/15 mL iron Liqd Take by mouth. 1 Liquid Oral Every day      pioglitazone (ACTOS) 15 MG tablet Take 1 tablet (15 mg total) by mouth once daily. 90 tablet 0    rOPINIRole (REQUIP) 1 MG tablet Take 1 tablet (1 mg total) by mouth 3 (three) times daily. 180 tablet 3    traZODone (DESYREL) 50 MG tablet Take 0.5 tablets (25 mg total) by mouth every evening. 30 tablet 11    triamcinolone acetonide 0.1% (KENALOG) 0.1 % cream Apply topically 2 (two) times daily. 45 g 5     No current facility-administered medications for this visit.        Review of Systems   Constitutional: Negative for chills and fever.   Respiratory: Negative for shortness of breath.    Cardiovascular: Negative for chest pain.   All other systems reviewed and are negative.      Objective:   Physical Exam   Vitals:    01/27/20 1315   BP: 138/66   BP Location: Left arm   Patient Position: Sitting   BP Method: Medium (Manual)   Pulse: 77   Temp: 98 °F (36.7 °C)   TempSrc: Oral   SpO2: 99%   Weight: 68.9 kg (152 lb)   Height: 5' 3" " (1.6 m)    Body mass index is 26.93 kg/m².            Physical Exam   Constitutional: She is oriented to person, place, and time. She appears well-developed and well-nourished. No distress.   Eyes: EOM are normal.   Cardiovascular: Normal rate and regular rhythm.   No murmur heard.  2/6 systolic murmur nonradiating  Carotids no bruits   Pulmonary/Chest: Effort normal and breath sounds normal.   Musculoskeletal: Normal range of motion. She exhibits no edema.   Neurological: She is alert and oriented to person, place, and time. Coordination normal.   Skin: Skin is warm and dry.   Psychiatric: She has a normal mood and affect. Her behavior is normal. Thought content normal.        Component      Latest Ref Rng & Units 1/10/2020 10/29/2019   WBC      3.90 - 12.70 K/uL  7.17   RBC      4.00 - 5.40 M/uL  5.79 (H)   Hemoglobin      12.0 - 16.0 g/dL  12.0   Hematocrit      37.0 - 48.5 %  42.1   MCV      82 - 98 fL  73 (L)   MCH      27.0 - 31.0 pg  20.7 (L)   MCHC      32.0 - 36.0 g/dL  28.5 (L)   RDW      11.5 - 14.5 %  14.6 (H)   Platelets      150 - 350 K/uL  324   MPV      9.2 - 12.9 fL  10.4   Immature Granulocytes      0.0 - 0.5 %  0.1   Gran # (ANC)      1.8 - 7.7 K/uL  2.9   Immature Grans (Abs)      0.00 - 0.04 K/uL  0.01   Lymph #      1.0 - 4.8 K/uL  3.7   Mono #      0.3 - 1.0 K/uL  0.5   Eos #      0.0 - 0.5 K/uL  0.1   Baso #      0.00 - 0.20 K/uL  0.09   nRBC      0 /100 WBC  0   Gran%      38.0 - 73.0 %  39.9   Lymph%      18.0 - 48.0 %  51.0 (H)   Mono%      4.0 - 15.0 %  6.3   Eosinophil%      0.0 - 8.0 %  1.4   Basophil%      0.0 - 1.9 %  1.3   Differential Method        Automated   Sodium      136 - 145 mmol/L 140 142   Potassium      3.5 - 5.1 mmol/L 4.8 4.3   Chloride      95 - 110 mmol/L 104 104   CO2      23 - 29 mmol/L 25 27   Glucose      70 - 110 mg/dL 115 (H) 174 (H)   BUN, Bld      8 - 23 mg/dL 19 14   Creatinine      0.5 - 1.4 mg/dL 0.9 0.9   Calcium      8.7 - 10.5 mg/dL 9.9 10.0   PROTEIN  TOTAL      6.0 - 8.4 g/dL 8.2 8.4   Albumin      3.5 - 5.2 g/dL 4.2 4.1   BILIRUBIN TOTAL      0.1 - 1.0 mg/dL 0.7 0.7   Alkaline Phosphatase      55 - 135 U/L 136 (H) 158 (H)   AST      10 - 40 U/L 39 42 (H)   ALT      10 - 44 U/L 36 37   Anion Gap      8 - 16 mmol/L 11 11   eGFR if African American      >60 mL/min/1.73 m:2 >60.0 >60.0   eGFR if non African American      >60 mL/min/1.73 m:2 >60.0 >60.0   Cholesterol      120 - 199 mg/dL  146   Triglycerides      30 - 150 mg/dL  182 (H)   HDL      40 - 75 mg/dL  49   LDL Cholesterol External      63.0 - 159.0 mg/dL  60.6 (L)   Hdl/Cholesterol Ratio      20.0 - 50.0 %  33.6   Total Cholesterol/HDL Ratio      2.0 - 5.0  3.0   Non-HDL Cholesterol      mg/dL  97   Hemoglobin A1C External      4.0 - 5.6 % 7.1 (H) 7.8 (H)   Estimated Avg Glucose      68 - 131 mg/dL 157 (H) 177 (H)

## 2020-01-27 ENCOUNTER — OFFICE VISIT (OUTPATIENT)
Dept: FAMILY MEDICINE | Facility: CLINIC | Age: 75
End: 2020-01-27
Payer: MEDICARE

## 2020-01-27 VITALS
DIASTOLIC BLOOD PRESSURE: 66 MMHG | TEMPERATURE: 98 F | OXYGEN SATURATION: 99 % | HEART RATE: 77 BPM | SYSTOLIC BLOOD PRESSURE: 138 MMHG | BODY MASS INDEX: 26.93 KG/M2 | WEIGHT: 152 LBS | HEIGHT: 63 IN

## 2020-01-27 DIAGNOSIS — E11.9 DIABETES MELLITUS TYPE 2 WITHOUT RETINOPATHY: ICD-10-CM

## 2020-01-27 DIAGNOSIS — Z23 NEED FOR SHINGLES VACCINE: ICD-10-CM

## 2020-01-27 DIAGNOSIS — E78.2 MIXED HYPERLIPIDEMIA: ICD-10-CM

## 2020-01-27 DIAGNOSIS — I10 ESSENTIAL HYPERTENSION: ICD-10-CM

## 2020-01-27 DIAGNOSIS — D50.9 MICROCYTIC ANEMIA: ICD-10-CM

## 2020-01-27 DIAGNOSIS — E11.9 TYPE 2 DIABETES MELLITUS WITHOUT COMPLICATION, WITHOUT LONG-TERM CURRENT USE OF INSULIN: Primary | ICD-10-CM

## 2020-01-27 DIAGNOSIS — I69.30 H/O: STROKE WITH RESIDUAL EFFECTS: ICD-10-CM

## 2020-01-27 PROCEDURE — 99214 PR OFFICE/OUTPT VISIT, EST, LEVL IV, 30-39 MIN: ICD-10-PCS | Mod: 25,S$GLB,, | Performed by: INTERNAL MEDICINE

## 2020-01-27 PROCEDURE — 3075F SYST BP GE 130 - 139MM HG: CPT | Mod: CPTII,S$GLB,, | Performed by: INTERNAL MEDICINE

## 2020-01-27 PROCEDURE — 1126F AMNT PAIN NOTED NONE PRSNT: CPT | Mod: S$GLB,,, | Performed by: INTERNAL MEDICINE

## 2020-01-27 PROCEDURE — 1101F PR PT FALLS ASSESS DOC 0-1 FALLS W/OUT INJ PAST YR: ICD-10-PCS | Mod: CPTII,S$GLB,, | Performed by: INTERNAL MEDICINE

## 2020-01-27 PROCEDURE — 99999 PR PBB SHADOW E&M-EST. PATIENT-LVL III: ICD-10-PCS | Mod: PBBFAC,,, | Performed by: INTERNAL MEDICINE

## 2020-01-27 PROCEDURE — 99999 PR PBB SHADOW E&M-EST. PATIENT-LVL III: CPT | Mod: PBBFAC,,, | Performed by: INTERNAL MEDICINE

## 2020-01-27 PROCEDURE — 1159F PR MEDICATION LIST DOCUMENTED IN MEDICAL RECORD: ICD-10-PCS | Mod: S$GLB,,, | Performed by: INTERNAL MEDICINE

## 2020-01-27 PROCEDURE — 99499 UNLISTED E&M SERVICE: CPT | Mod: S$GLB,,, | Performed by: INTERNAL MEDICINE

## 2020-01-27 PROCEDURE — 1126F PR PAIN SEVERITY QUANTIFIED, NO PAIN PRESENT: ICD-10-PCS | Mod: S$GLB,,, | Performed by: INTERNAL MEDICINE

## 2020-01-27 PROCEDURE — 3078F DIAST BP <80 MM HG: CPT | Mod: CPTII,S$GLB,, | Performed by: INTERNAL MEDICINE

## 2020-01-27 PROCEDURE — 1101F PT FALLS ASSESS-DOCD LE1/YR: CPT | Mod: CPTII,S$GLB,, | Performed by: INTERNAL MEDICINE

## 2020-01-27 PROCEDURE — 99214 OFFICE O/P EST MOD 30 MIN: CPT | Mod: 25,S$GLB,, | Performed by: INTERNAL MEDICINE

## 2020-01-27 PROCEDURE — 3078F PR MOST RECENT DIASTOLIC BLOOD PRESSURE < 80 MM HG: ICD-10-PCS | Mod: CPTII,S$GLB,, | Performed by: INTERNAL MEDICINE

## 2020-01-27 PROCEDURE — 99499 RISK ADDL DX/OHS AUDIT: ICD-10-PCS | Mod: S$GLB,,, | Performed by: INTERNAL MEDICINE

## 2020-01-27 PROCEDURE — 3075F PR MOST RECENT SYSTOLIC BLOOD PRESS GE 130-139MM HG: ICD-10-PCS | Mod: CPTII,S$GLB,, | Performed by: INTERNAL MEDICINE

## 2020-01-27 PROCEDURE — 1159F MED LIST DOCD IN RCRD: CPT | Mod: S$GLB,,, | Performed by: INTERNAL MEDICINE

## 2020-04-15 DIAGNOSIS — E11.9 TYPE 2 DIABETES MELLITUS WITHOUT COMPLICATION, WITHOUT LONG-TERM CURRENT USE OF INSULIN: ICD-10-CM

## 2020-04-15 RX ORDER — PIOGLITAZONEHYDROCHLORIDE 15 MG/1
15 TABLET ORAL DAILY
Qty: 90 TABLET | Refills: 0 | Status: SHIPPED | OUTPATIENT
Start: 2020-04-15 | End: 2020-07-31

## 2020-04-15 NOTE — TELEPHONE ENCOUNTER
----- Message from Lisa Arteaga sent at 4/15/2020  1:30 PM CDT -----  Contact: self  Refill : pioglitazone (ACTOS) 15 MG tablet        [id number]  JANNIE MELTON  [address] [place] [address] [facility]  [place] [id number]  Phone: [phone] Fax: [phone]

## 2020-05-05 ENCOUNTER — TELEPHONE (OUTPATIENT)
Dept: FAMILY MEDICINE | Facility: CLINIC | Age: 75
End: 2020-05-05

## 2020-06-18 ENCOUNTER — TELEPHONE (OUTPATIENT)
Dept: FAMILY MEDICINE | Facility: CLINIC | Age: 75
End: 2020-06-18

## 2020-06-18 NOTE — TELEPHONE ENCOUNTER
----- Message from Mag Davidson sent at 6/18/2020 10:19 AM CDT -----  Type: Patient Call Back    Who called: Self     What is the request in detail:  calling in regards to medication metFORMIN (GLUCOPHAGE-XR) 500 MG 24 hr tablet. Pharmacy called stating that medication was recalled. Pt. Would like to know if it is safe to cont. Taking medication.    Can the clinic reply by [facility]? Call back     Would the patient rather a call back or a response via [facility]? Call back     Best call back number: [phone]

## 2020-07-31 DIAGNOSIS — E11.9 TYPE 2 DIABETES MELLITUS WITHOUT COMPLICATION, WITHOUT LONG-TERM CURRENT USE OF INSULIN: ICD-10-CM

## 2020-07-31 RX ORDER — PIOGLITAZONEHYDROCHLORIDE 15 MG/1
TABLET ORAL
Qty: 90 TABLET | Refills: 0 | Status: SHIPPED | OUTPATIENT
Start: 2020-07-31 | End: 2020-11-18

## 2020-08-03 NOTE — TELEPHONE ENCOUNTER
Patient did not have time to make her appointment when called . She states that she will call the office back later.  GAYE

## 2020-08-14 DIAGNOSIS — Z11.59 NEED FOR HEPATITIS C SCREENING TEST: ICD-10-CM

## 2020-09-11 DIAGNOSIS — I10 ESSENTIAL HYPERTENSION: ICD-10-CM

## 2020-09-11 RX ORDER — LOSARTAN POTASSIUM 100 MG/1
100 TABLET ORAL DAILY
Qty: 90 TABLET | Refills: 0 | Status: SHIPPED | OUTPATIENT
Start: 2020-09-11 | End: 2020-09-11 | Stop reason: SDUPTHER

## 2020-09-11 RX ORDER — LOSARTAN POTASSIUM 100 MG/1
100 TABLET ORAL DAILY
Qty: 90 TABLET | Refills: 0 | Status: SHIPPED | OUTPATIENT
Start: 2020-09-11 | End: 2020-12-01 | Stop reason: SDUPTHER

## 2020-10-08 DIAGNOSIS — E11.9 DIABETES MELLITUS TYPE 2 WITHOUT RETINOPATHY: ICD-10-CM

## 2020-10-08 RX ORDER — CALCIUM CITRATE/VITAMIN D3 200MG-6.25
TABLET ORAL
Qty: 100 STRIP | Refills: 11 | Status: SHIPPED | OUTPATIENT
Start: 2020-10-08 | End: 2023-10-06 | Stop reason: SDUPTHER

## 2020-10-08 RX ORDER — LANCETS 33 GAUGE
1 EACH MISCELLANEOUS DAILY
Qty: 30 EACH | Refills: 11 | Status: SHIPPED | OUTPATIENT
Start: 2020-10-08 | End: 2023-10-06 | Stop reason: SDUPTHER

## 2020-10-08 NOTE — TELEPHONE ENCOUNTER
----- Message from Josselyn Spencer sent at 10/8/2020 10:21 AM CDT -----  [phone]  Type:  Call Back    Who called:Elina, [facility]    What is the request in detail: Need to get orders, clinic notes, and medical necessity form faxed so that she can order diabetic supplies for the patient. Fax [phone]    Would the patient rather a call back or a response via [facility]? #call back    Best call back number:[phone]

## 2020-11-23 ENCOUNTER — LAB VISIT (OUTPATIENT)
Dept: LAB | Facility: HOSPITAL | Age: 75
End: 2020-11-23
Attending: INTERNAL MEDICINE
Payer: MEDICARE

## 2020-11-23 DIAGNOSIS — E11.9 TYPE 2 DIABETES MELLITUS WITHOUT COMPLICATION, WITHOUT LONG-TERM CURRENT USE OF INSULIN: ICD-10-CM

## 2020-11-23 DIAGNOSIS — Z11.59 NEED FOR HEPATITIS C SCREENING TEST: ICD-10-CM

## 2020-11-23 LAB
ALBUMIN SERPL BCP-MCNC: 3.9 G/DL (ref 3.5–5.2)
ALP SERPL-CCNC: 162 U/L (ref 55–135)
ALT SERPL W/O P-5'-P-CCNC: 50 U/L (ref 10–44)
ANION GAP SERPL CALC-SCNC: 13 MMOL/L (ref 8–16)
AST SERPL-CCNC: 45 U/L (ref 10–40)
BILIRUB SERPL-MCNC: 0.8 MG/DL (ref 0.1–1)
BUN SERPL-MCNC: 12 MG/DL (ref 8–23)
CALCIUM SERPL-MCNC: 9.6 MG/DL (ref 8.7–10.5)
CHLORIDE SERPL-SCNC: 103 MMOL/L (ref 95–110)
CHOLEST SERPL-MCNC: 165 MG/DL (ref 120–199)
CHOLEST/HDLC SERPL: 3.5 {RATIO} (ref 2–5)
CO2 SERPL-SCNC: 24 MMOL/L (ref 23–29)
CREAT SERPL-MCNC: 0.8 MG/DL (ref 0.5–1.4)
EST. GFR  (AFRICAN AMERICAN): >60 ML/MIN/1.73 M^2
EST. GFR  (NON AFRICAN AMERICAN): >60 ML/MIN/1.73 M^2
ESTIMATED AVG GLUCOSE: 171 MG/DL (ref 68–131)
GLUCOSE SERPL-MCNC: 142 MG/DL (ref 70–110)
HBA1C MFR BLD HPLC: 7.6 % (ref 4–5.6)
HDLC SERPL-MCNC: 47 MG/DL (ref 40–75)
HDLC SERPL: 28.5 % (ref 20–50)
LDLC SERPL CALC-MCNC: 80.2 MG/DL (ref 63–159)
NONHDLC SERPL-MCNC: 118 MG/DL
POTASSIUM SERPL-SCNC: 4 MMOL/L (ref 3.5–5.1)
PROT SERPL-MCNC: 8.1 G/DL (ref 6–8.4)
SODIUM SERPL-SCNC: 140 MMOL/L (ref 136–145)
TRIGL SERPL-MCNC: 189 MG/DL (ref 30–150)

## 2020-11-23 PROCEDURE — 83036 HEMOGLOBIN GLYCOSYLATED A1C: CPT

## 2020-11-23 PROCEDURE — 86803 HEPATITIS C AB TEST: CPT

## 2020-11-23 PROCEDURE — 80061 LIPID PANEL: CPT

## 2020-11-23 PROCEDURE — 80053 COMPREHEN METABOLIC PANEL: CPT

## 2020-11-23 PROCEDURE — 36415 COLL VENOUS BLD VENIPUNCTURE: CPT | Mod: PO

## 2020-11-24 ENCOUNTER — TELEPHONE (OUTPATIENT)
Dept: FAMILY MEDICINE | Facility: CLINIC | Age: 75
End: 2020-11-24

## 2020-11-24 DIAGNOSIS — R74.8 ABNORMAL ALKALINE PHOSPHATASE TEST: ICD-10-CM

## 2020-11-24 DIAGNOSIS — D50.9 MICROCYTIC ANEMIA: Primary | ICD-10-CM

## 2020-11-24 LAB — HCV AB SERPL QL IA: NEGATIVE

## 2020-11-24 NOTE — TELEPHONE ENCOUNTER
"Please call pt - she did labs yesterday, one of her liver tests "alkaline phosphatase" was abnormal and has been the past few times.    Can she come back for another lab test? Sorry for inconvenience.    She has appt with me next week. Would like to get results before her visit.    I will order test.  Nonfasting.   "

## 2020-11-27 ENCOUNTER — LAB VISIT (OUTPATIENT)
Dept: LAB | Facility: HOSPITAL | Age: 75
End: 2020-11-27
Attending: INTERNAL MEDICINE
Payer: MEDICARE

## 2020-11-27 DIAGNOSIS — D50.9 MICROCYTIC ANEMIA: ICD-10-CM

## 2020-11-27 DIAGNOSIS — R74.8 ABNORMAL ALKALINE PHOSPHATASE TEST: ICD-10-CM

## 2020-11-27 LAB
BASOPHILS # BLD AUTO: 0.06 K/UL (ref 0–0.2)
BASOPHILS NFR BLD: 0.7 % (ref 0–1.9)
DIFFERENTIAL METHOD: ABNORMAL
EOSINOPHIL # BLD AUTO: 0.1 K/UL (ref 0–0.5)
EOSINOPHIL NFR BLD: 1.6 % (ref 0–8)
ERYTHROCYTE [DISTWIDTH] IN BLOOD BY AUTOMATED COUNT: 15.1 % (ref 11.5–14.5)
HCT VFR BLD AUTO: 41 % (ref 37–48.5)
HGB BLD-MCNC: 11.7 G/DL (ref 12–16)
IMM GRANULOCYTES # BLD AUTO: 0.02 K/UL (ref 0–0.04)
IMM GRANULOCYTES NFR BLD AUTO: 0.2 % (ref 0–0.5)
LYMPHOCYTES # BLD AUTO: 3.9 K/UL (ref 1–4.8)
LYMPHOCYTES NFR BLD: 44.9 % (ref 18–48)
MCH RBC QN AUTO: 20.5 PG (ref 27–31)
MCHC RBC AUTO-ENTMCNC: 28.5 G/DL (ref 32–36)
MCV RBC AUTO: 72 FL (ref 82–98)
MONOCYTES # BLD AUTO: 0.7 K/UL (ref 0.3–1)
MONOCYTES NFR BLD: 8 % (ref 4–15)
NEUTROPHILS # BLD AUTO: 3.9 K/UL (ref 1.8–7.7)
NEUTROPHILS NFR BLD: 44.6 % (ref 38–73)
NRBC BLD-RTO: 0 /100 WBC
PLATELET # BLD AUTO: 308 K/UL (ref 150–350)
PMV BLD AUTO: 10.5 FL (ref 9.2–12.9)
RBC # BLD AUTO: 5.72 M/UL (ref 4–5.4)
WBC # BLD AUTO: 8.77 K/UL (ref 3.9–12.7)

## 2020-11-27 PROCEDURE — 36415 COLL VENOUS BLD VENIPUNCTURE: CPT | Mod: PO

## 2020-11-27 PROCEDURE — 84080 ASSAY ALKALINE PHOSPHATASES: CPT

## 2020-11-27 PROCEDURE — 85025 COMPLETE CBC W/AUTO DIFF WBC: CPT

## 2020-11-27 PROCEDURE — 84075 ASSAY ALKALINE PHOSPHATASE: CPT

## 2020-12-01 ENCOUNTER — OFFICE VISIT (OUTPATIENT)
Dept: FAMILY MEDICINE | Facility: CLINIC | Age: 75
End: 2020-12-01
Payer: MEDICARE

## 2020-12-01 VITALS
SYSTOLIC BLOOD PRESSURE: 150 MMHG | OXYGEN SATURATION: 97 % | DIASTOLIC BLOOD PRESSURE: 70 MMHG | BODY MASS INDEX: 27.29 KG/M2 | HEART RATE: 96 BPM | HEIGHT: 63 IN | WEIGHT: 154 LBS | TEMPERATURE: 98 F

## 2020-12-01 DIAGNOSIS — R74.8 ALKALINE PHOSPHATASE ELEVATION: ICD-10-CM

## 2020-12-01 DIAGNOSIS — E78.2 MIXED HYPERLIPIDEMIA: ICD-10-CM

## 2020-12-01 DIAGNOSIS — E11.9 DIABETES MELLITUS TYPE 2 WITHOUT RETINOPATHY: Primary | ICD-10-CM

## 2020-12-01 DIAGNOSIS — G25.81 RESTLESS LEGS SYNDROME: ICD-10-CM

## 2020-12-01 DIAGNOSIS — D50.9 MICROCYTIC ANEMIA: ICD-10-CM

## 2020-12-01 DIAGNOSIS — I10 ESSENTIAL HYPERTENSION: ICD-10-CM

## 2020-12-01 DIAGNOSIS — E11.9 TYPE 2 DIABETES MELLITUS WITHOUT COMPLICATION, WITHOUT LONG-TERM CURRENT USE OF INSULIN: ICD-10-CM

## 2020-12-01 PROCEDURE — 1101F PT FALLS ASSESS-DOCD LE1/YR: CPT | Mod: CPTII,S$GLB,, | Performed by: INTERNAL MEDICINE

## 2020-12-01 PROCEDURE — 3288F PR FALLS RISK ASSESSMENT DOCUMENTED: ICD-10-PCS | Mod: CPTII,S$GLB,, | Performed by: INTERNAL MEDICINE

## 2020-12-01 PROCEDURE — 99214 PR OFFICE/OUTPT VISIT, EST, LEVL IV, 30-39 MIN: ICD-10-PCS | Mod: S$GLB,,, | Performed by: INTERNAL MEDICINE

## 2020-12-01 PROCEDURE — 3077F SYST BP >= 140 MM HG: CPT | Mod: CPTII,S$GLB,, | Performed by: INTERNAL MEDICINE

## 2020-12-01 PROCEDURE — 3288F FALL RISK ASSESSMENT DOCD: CPT | Mod: CPTII,S$GLB,, | Performed by: INTERNAL MEDICINE

## 2020-12-01 PROCEDURE — 99499 UNLISTED E&M SERVICE: CPT | Mod: S$GLB,,, | Performed by: INTERNAL MEDICINE

## 2020-12-01 PROCEDURE — 99999 PR PBB SHADOW E&M-EST. PATIENT-LVL IV: ICD-10-PCS | Mod: PBBFAC,,, | Performed by: INTERNAL MEDICINE

## 2020-12-01 PROCEDURE — 3051F PR MOST RECENT HEMOGLOBIN A1C LEVEL 7.0 - < 8.0%: ICD-10-PCS | Mod: CPTII,S$GLB,, | Performed by: INTERNAL MEDICINE

## 2020-12-01 PROCEDURE — 3051F HG A1C>EQUAL 7.0%<8.0%: CPT | Mod: CPTII,S$GLB,, | Performed by: INTERNAL MEDICINE

## 2020-12-01 PROCEDURE — 99999 PR PBB SHADOW E&M-EST. PATIENT-LVL IV: CPT | Mod: PBBFAC,,, | Performed by: INTERNAL MEDICINE

## 2020-12-01 PROCEDURE — 1101F PR PT FALLS ASSESS DOC 0-1 FALLS W/OUT INJ PAST YR: ICD-10-PCS | Mod: CPTII,S$GLB,, | Performed by: INTERNAL MEDICINE

## 2020-12-01 PROCEDURE — 1159F MED LIST DOCD IN RCRD: CPT | Mod: S$GLB,,, | Performed by: INTERNAL MEDICINE

## 2020-12-01 PROCEDURE — 99214 OFFICE O/P EST MOD 30 MIN: CPT | Mod: S$GLB,,, | Performed by: INTERNAL MEDICINE

## 2020-12-01 PROCEDURE — 3078F PR MOST RECENT DIASTOLIC BLOOD PRESSURE < 80 MM HG: ICD-10-PCS | Mod: CPTII,S$GLB,, | Performed by: INTERNAL MEDICINE

## 2020-12-01 PROCEDURE — 99499 RISK ADDL DX/OHS AUDIT: ICD-10-PCS | Mod: S$GLB,,, | Performed by: INTERNAL MEDICINE

## 2020-12-01 PROCEDURE — 3078F DIAST BP <80 MM HG: CPT | Mod: CPTII,S$GLB,, | Performed by: INTERNAL MEDICINE

## 2020-12-01 PROCEDURE — 1126F AMNT PAIN NOTED NONE PRSNT: CPT | Mod: S$GLB,,, | Performed by: INTERNAL MEDICINE

## 2020-12-01 PROCEDURE — 1159F PR MEDICATION LIST DOCUMENTED IN MEDICAL RECORD: ICD-10-PCS | Mod: S$GLB,,, | Performed by: INTERNAL MEDICINE

## 2020-12-01 PROCEDURE — 1126F PR PAIN SEVERITY QUANTIFIED, NO PAIN PRESENT: ICD-10-PCS | Mod: S$GLB,,, | Performed by: INTERNAL MEDICINE

## 2020-12-01 PROCEDURE — 3077F PR MOST RECENT SYSTOLIC BLOOD PRESSURE >= 140 MM HG: ICD-10-PCS | Mod: CPTII,S$GLB,, | Performed by: INTERNAL MEDICINE

## 2020-12-01 RX ORDER — GLIPIZIDE 10 MG/1
10 TABLET, FILM COATED, EXTENDED RELEASE ORAL
Qty: 90 TABLET | Refills: 3 | Status: SHIPPED | OUTPATIENT
Start: 2020-12-01 | End: 2022-02-17 | Stop reason: SDUPTHER

## 2020-12-01 RX ORDER — ROPINIROLE 1 MG/1
1 TABLET, FILM COATED ORAL 3 TIMES DAILY
Qty: 180 TABLET | Refills: 3 | Status: SHIPPED | OUTPATIENT
Start: 2020-12-01 | End: 2020-12-01 | Stop reason: SDUPTHER

## 2020-12-01 RX ORDER — PIOGLITAZONEHYDROCHLORIDE 15 MG/1
15 TABLET ORAL DAILY
Qty: 90 TABLET | Refills: 3 | Status: SHIPPED | OUTPATIENT
Start: 2020-12-01 | End: 2021-08-17 | Stop reason: ALTCHOICE

## 2020-12-01 RX ORDER — LOSARTAN POTASSIUM 100 MG/1
100 TABLET ORAL DAILY
Qty: 90 TABLET | Refills: 3 | Status: SHIPPED | OUTPATIENT
Start: 2020-12-01 | End: 2022-02-08

## 2020-12-01 RX ORDER — LOSARTAN POTASSIUM 100 MG/1
100 TABLET ORAL DAILY
Qty: 90 TABLET | Refills: 3 | Status: SHIPPED | OUTPATIENT
Start: 2020-12-01 | End: 2020-12-01 | Stop reason: SDUPTHER

## 2020-12-01 RX ORDER — ROPINIROLE 1 MG/1
1 TABLET, FILM COATED ORAL 3 TIMES DAILY
Qty: 180 TABLET | Refills: 3 | Status: SHIPPED | OUTPATIENT
Start: 2020-12-01 | End: 2022-02-17 | Stop reason: SDUPTHER

## 2020-12-01 RX ORDER — METOPROLOL SUCCINATE 100 MG/1
100 TABLET, EXTENDED RELEASE ORAL DAILY
Qty: 90 TABLET | Refills: 3 | Status: SHIPPED | OUTPATIENT
Start: 2020-12-01 | End: 2022-02-17 | Stop reason: SDUPTHER

## 2020-12-01 RX ORDER — AMLODIPINE BESYLATE 10 MG/1
10 TABLET ORAL DAILY
Qty: 90 TABLET | Refills: 3 | Status: SHIPPED | OUTPATIENT
Start: 2020-12-01 | End: 2020-12-04 | Stop reason: SDUPTHER

## 2020-12-01 RX ORDER — AMLODIPINE BESYLATE 10 MG/1
10 TABLET ORAL DAILY
Qty: 90 TABLET | Refills: 3 | Status: SHIPPED | OUTPATIENT
Start: 2020-12-01 | End: 2020-12-01 | Stop reason: SDUPTHER

## 2020-12-01 RX ORDER — METFORMIN HYDROCHLORIDE 500 MG/1
1000 TABLET, EXTENDED RELEASE ORAL 2 TIMES DAILY WITH MEALS
Qty: 360 TABLET | Refills: 3 | Status: SHIPPED | OUTPATIENT
Start: 2020-12-01 | End: 2022-02-17 | Stop reason: SDUPTHER

## 2020-12-01 RX ORDER — ATORVASTATIN CALCIUM 40 MG/1
40 TABLET, FILM COATED ORAL DAILY
Qty: 90 TABLET | Refills: 3 | Status: SHIPPED | OUTPATIENT
Start: 2020-12-01 | End: 2022-02-17 | Stop reason: SDUPTHER

## 2020-12-01 NOTE — PROGRESS NOTES
This note was created by combination of typed  and M-Modal dictation.  Transcription errors may be present.  If there are any questions, please contact me.    Assessment and Plan:   Diabetes mellitus type 2 without retinopathy  Type 2 diabetes mellitus without complication, without long-term current use of insulin; januvia expensive;1/2020 actos  -A1c not ideal.  For now no change work on diet and physical activity modification.  Stay on same medicines.  If still high next visit consider swapping out glipizide and Actos for rybelsus.  She does not like injections.   Foot exam normal  Asked her to arrange f/u optometry appt.  -     glipiZIDE (GLUCOTROL) 10 MG TR24; Take 1 tablet (10 mg total) by mouth daily with breakfast.  Dispense: 90 tablet; Refill: 3  -     metFORMIN (GLUCOPHAGE-XR) 500 MG ER 24hr tablet; Take 2 tablets (1,000 mg total) by mouth 2 (two) times daily with meals.  Dispense: 360 tablet; Refill: 3  -     pioglitazone (ACTOS) 15 MG tablet; Take 1 tablet (15 mg total) by mouth once daily.  Dispense: 90 tablet; Refill: 3  -     Comprehensive Metabolic Panel; Future; Expected date: 03/01/2021  -     CBC Auto Differential; Future; Expected date: 03/01/2021  -     Lipid Panel; Future; Expected date: 03/01/2021  -     Hemoglobin A1C; Future; Expected date: 03/01/2021    Mixed hyperlipidemia; 7/2018 prava to atorva  -TG high otherwise OK. No changes.  lipitor 40  -     atorvastatin (LIPITOR) 40 MG tablet; Take 1 tablet (40 mg total) by mouth once daily.  Dispense: 90 tablet; Refill: 3    Essential hypertension  -she thinks white coat syndrome. Need to prove it.  Asked her to monitor and bring in log  Discussed that if high next visit and no log of home readings or BP high, then will need to adjust meds  In which case consider hctz  -     Discontinue: amLODIPine (NORVASC) 10 MG tablet; Take 1 tablet (10 mg total) by mouth once daily.  Dispense: 90 tablet; Refill: 3  -     Discontinue: losartan  (COZAAR) 100 MG tablet; Take 1 tablet (100 mg total) by mouth once daily.  Dispense: 90 tablet; Refill: 3  -     metoprolol succinate (TOPROL-XL) 100 MG 24 hr tablet; Take 1 tablet (100 mg total) by mouth once daily.  Dispense: 90 tablet; Refill: 3  -     amLODIPine (NORVASC) 10 MG tablet; Take 1 tablet (10 mg total) by mouth once daily.  Dispense: 90 tablet; Refill: 3  -     losartan (COZAAR) 100 MG tablet; Take 1 tablet (100 mg total) by mouth once daily.  Dispense: 90 tablet; Refill: 3    Microcytic anemia mentzer index < 13, suspect this is underlying thalassemia. normal ferritin  -stable.    Restless legs syndrome  Refill requip  -     Discontinue: rOPINIRole (REQUIP) 1 MG tablet; Take 1 tablet (1 mg total) by mouth 3 (three) times daily.  Dispense: 180 tablet; Refill: 3  -     rOPINIRole (REQUIP) 1 MG tablet; Take 1 tablet (1 mg total) by mouth 3 (three) times daily.  Dispense: 180 tablet; Refill: 3    Alkaline phosphatase elevation  -await isoenzymes, further testing depending on results.    Medications Discontinued During This Encounter   Medication Reason    rOPINIRole (REQUIP) 1 MG tablet Reorder    amLODIPine (NORVASC) 10 MG tablet Reorder    metFORMIN (GLUCOPHAGE-XR) 500 MG 24 hr tablet Reorder    glipiZIDE (GLUCOTROL) 10 MG TR24 Reorder    metoprolol succinate (TOPROL-XL) 100 MG 24 hr tablet Reorder    atorvastatin (LIPITOR) 40 MG tablet Reorder    losartan (COZAAR) 100 MG tablet Reorder    pioglitazone (ACTOS) 15 MG tablet Reorder    amLODIPine (NORVASC) 10 MG tablet Reorder    losartan (COZAAR) 100 MG tablet Reorder    rOPINIRole (REQUIP) 1 MG tablet Reorder       meds sent this encounter:  Medications Ordered This Encounter   Medications    amLODIPine (NORVASC) 10 MG tablet     Sig: Take 1 tablet (10 mg total) by mouth once daily.     Dispense:  90 tablet     Refill:  3          atorvastatin (LIPITOR) 40 MG tablet     Sig: Take 1 tablet (40 mg total) by mouth once daily.     Dispense:   "90 tablet     Refill:  3    glipiZIDE (GLUCOTROL) 10 MG TR24     Sig: Take 1 tablet (10 mg total) by mouth daily with breakfast.     Dispense:  90 tablet     Refill:  3    losartan (COZAAR) 100 MG tablet     Sig: Take 1 tablet (100 mg total) by mouth once daily.     Dispense:  90 tablet     Refill:  3          metFORMIN (GLUCOPHAGE-XR) 500 MG ER 24hr tablet     Sig: Take 2 tablets (1,000 mg total) by mouth 2 (two) times daily with meals.     Dispense:  360 tablet     Refill:  3     Stop the regular release metformin    metoprolol succinate (TOPROL-XL) 100 MG 24 hr tablet     Sig: Take 1 tablet (100 mg total) by mouth once daily.     Dispense:  90 tablet     Refill:  3     .    pioglitazone (ACTOS) 15 MG tablet     Sig: Take 1 tablet (15 mg total) by mouth once daily.     Dispense:  90 tablet     Refill:  3    rOPINIRole (REQUIP) 1 MG tablet     Sig: Take 1 tablet (1 mg total) by mouth 3 (three) times daily.     Dispense:  180 tablet     Refill:  3             Follow Up: No follow-ups on file.      Subjective:     Chief Complaint   Patient presents with    Diabetes    Hypertension       HPI  Ramya is a 74 y.o. female, last appointment with this clinic was Visit date not found.    No LMP recorded. Patient has had a hysterectomy.    Last visit with me in January.  Diabetes.  Hypertension.  History of stroke.  Hyperlipidemia.  Microcytic anemia.  I suspect underlying thalassemia.  Normal ferritin.    Pre visit labs microcytic anemia seen previously  CMP with elevated alkaline phosphatase, persistent.  Lipid profile good.  A1c 7.6 from previous 7.1  Urine microalbumin negative.    History of Januvia, expensive    Rare use of trazodone.    Sees dr. Baltazar.  Is due for eye exam.     Yes home glc checks. Has not been checking of late. This AM was "high"  - 181. Is running out of metformin and was trying to space it out. For the past week.     BP high on intake, high on repeat. She thinks white coat syndrome.  Has a " home cuff but not checking.  Discussed with her she needs to start.  If next visit no BP log and high, I will start additional medication.  I wrote instructiosn for her.     No chest pain no dyspnea  No numbness/tingling    Alk phos persistently mild elevated.  Ordered isoenzymes, pending discussed findings and depending on results will direct any further workup      Patient Care Team:  Kameron Donato MD as PCP - General (Internal Medicine)  Melina Parmar MA as Care Coordinator    Patient Active Problem List    Diagnosis Date Noted    Microcytic anemia mentzer index < 13, suspect this is underlying thalassemia. normal ferritin 2019    RLS (restless legs syndrome) 2018    Flow murmur, 2018 TTE WNL 2018    Nuclear sclerosis of both eyes 2018    Refractive errors 2018    Diabetes mellitus type 2 without retinopathy 2016    Mixed hyperlipidemia; 2018 prava to atorva 2012    Essential hypertension 2012    Type 2 diabetes mellitus without complication, without long-term current use of insulin; januvia expensive;2020 actos 2012    H/O: stroke with residual effects 2012       PAST MEDICAL HISTORY:  Past Medical History:   Diagnosis Date    Diabetes mellitus type II     Hyperlipidemia     Hypertension     Nuclear sclerosis of both eyes 2018    Stroke        PAST SURGICAL HISTORY:  Past Surgical History:   Procedure Laterality Date    4 MISCARRIAGES       8 PARA 4      HYSTERECTOMY      laser right eye  Right ? yrs    pt had stroke and tried to bring vision back        SOCIAL HISTORY:  Social History     Socioeconomic History    Marital status:      Spouse name: Not on file    Number of children: Not on file    Years of education: Not on file    Highest education level: Not on file   Occupational History    Not on file   Social Needs    Financial resource strain: Not on file    Food insecurity     Worry: Not  on file     Inability: Not on file    Transportation needs     Medical: Not on file     Non-medical: Not on file   Tobacco Use    Smoking status: Never Smoker    Smokeless tobacco: Never Used   Substance and Sexual Activity    Alcohol use: No    Drug use: No    Sexual activity: Not on file   Lifestyle    Physical activity     Days per week: Not on file     Minutes per session: Not on file    Stress: Not on file   Relationships    Social connections     Talks on phone: Not on file     Gets together: Not on file     Attends Restorationism service: Not on file     Active member of club or organization: Not on file     Attends meetings of clubs or organizations: Not on file     Relationship status: Not on file   Other Topics Concern    Not on file   Social History Narrative    Lives in [place].    2014.          ALLERGIES AND MEDICATIONS: updated and reviewed.  Review of patient's allergies indicates:   Allergen Reactions    Citalopram analogues Other (See Comments)     Hallucinations       Medication List with Changes/Refills   Current Medications    AMLODIPINE (NORVASC) 10 MG TABLET    Take 1 tablet (10 mg total) by mouth once daily.    ASPIRIN 325 MG TABLET    Take 325 mg by mouth. 1 Tablet Oral Every day    ATORVASTATIN (LIPITOR) 40 MG TABLET    Take 1 tablet by mouth once daily    BLOOD SUGAR DIAGNOSTIC (TRUE METRIX GLUCOSE TEST STRIP) STRP    Daily glucose testing; whichever brand covered by insurance.    GLIPIZIDE (GLUCOTROL) 10 MG TR24    Take 1 tablet (10 mg total) by mouth daily with breakfast. Stop glyburide    LANCETS (ONETOUCH DELICA LANCETS) 33 GAUGE MISC    1 lancet by Misc.(Non-Drug; Combo Route) route once daily. ONCE DAILY BLOOD SUGAR TESTING; WHICHEVER BRAND COVERED BY INSURANCE    LOSARTAN (COZAAR) 100 MG TABLET    Take 1 tablet (100 mg total) by mouth once daily.    METFORMIN (GLUCOPHAGE-XR) 500 MG 24 HR TABLET    Take 2 tablets (1,000 mg total) by mouth 2 (two) times  "daily with meals.    METOPROLOL SUCCINATE (TOPROL-XL) 100 MG 24 HR TABLET    Take 1 tablet by mouth once daily    MULTIVIT & MINS-FERROUS GLUCON (CENTRUM) 9 MG/15 ML IRON LIQD    Take by mouth. 1 Liquid Oral Every day    PIOGLITAZONE (ACTOS) 15 MG TABLET    Take 1 tablet by mouth once daily    ROPINIROLE (REQUIP) 1 MG TABLET    Take 1 tablet (1 mg total) by mouth 3 (three) times daily.    TRAZODONE (DESYREL) 50 MG TABLET    Take 0.5 tablets (25 mg total) by mouth every evening.    TRIAMCINOLONE ACETONIDE 0.1% (KENALOG) 0.1 % CREAM    Apply topically 2 (two) times daily.       Review of Systems   Constitutional: Negative for chills and fever.   Respiratory: Negative for shortness of breath.    Cardiovascular: Negative for chest pain.   All other systems reviewed and are negative.      Objective:   Physical Exam  Vitals:    12/01/20 1405 12/01/20 1424   BP: (!) 168/72 (!) 150/70   BP Location: Right arm Left arm   Patient Position: Sitting Sitting   BP Method: Medium (Manual) Medium (Manual)   Pulse: 96    Temp: 97.7 °F (36.5 °C)    TempSrc: Temporal    SpO2: 97%    Weight: 69.9 kg (154 lb)    Height: 5' 3" (1.6 m)     Body mass index is 27.28 kg/m².  Weight: 69.9 kg (154 lb)   Height: 5' 3" (160 cm)     Physical Exam  Constitutional:       General: She is not in acute distress.     Appearance: She is well-developed.   Cardiovascular:      Rate and Rhythm: Normal rate and regular rhythm.      Pulses:           Dorsalis pedis pulses are 1+ on the right side and 3+ on the left side.        Posterior tibial pulses are 1+ on the right side and 2+ on the left side.      Heart sounds: Normal heart sounds. No murmur.   Pulmonary:      Effort: Pulmonary effort is normal.      Breath sounds: Normal breath sounds.   Musculoskeletal: Normal range of motion.      Right lower leg: No edema.      Left lower leg: No edema.      Right foot: No deformity.      Left foot: No deformity.   Feet:      Right foot:      Protective " Sensation: 5 sites tested. 5 sites sensed.      Skin integrity: No ulcer, blister, skin breakdown, erythema or warmth.      Left foot:      Protective Sensation: 5 sites tested. 5 sites sensed.      Skin integrity: No ulcer, blister, skin breakdown, erythema or warmth.   Skin:     General: Skin is warm and dry.   Neurological:      Mental Status: She is alert and oriented to person, place, and time.      Coordination: Coordination normal.   Psychiatric:         Behavior: Behavior normal.         Thought Content: Thought content normal.         Component      Latest Ref Rng & Units 11/27/2020 11/23/2020 1/10/2020   WBC      3.90 - 12.70 K/uL 8.77     RBC      4.00 - 5.40 M/uL 5.72 (H)     Hemoglobin      12.0 - 16.0 g/dL 11.7 (L)     Hematocrit      37.0 - 48.5 % 41.0     MCV      82 - 98 fL 72 (L)     MCH      27.0 - 31.0 pg 20.5 (L)     MCHC      32.0 - 36.0 g/dL 28.5 (L)     RDW      11.5 - 14.5 % 15.1 (H)     Platelets      150 - 350 K/uL 308     MPV      9.2 - 12.9 fL 10.5     Immature Granulocytes      0.0 - 0.5 % 0.2     Gran # (ANC)      1.8 - 7.7 K/uL 3.9     Immature Grans (Abs)      0.00 - 0.04 K/uL 0.02     Lymph #      1.0 - 4.8 K/uL 3.9     Mono #      0.3 - 1.0 K/uL 0.7     Eos #      0.0 - 0.5 K/uL 0.1     Baso #      0.00 - 0.20 K/uL 0.06     nRBC      0 /100 WBC 0     Gran %      38.0 - 73.0 % 44.6     Lymph %      18.0 - 48.0 % 44.9     Mono %      4.0 - 15.0 % 8.0     Eosinophil %      0.0 - 8.0 % 1.6     Basophil %      0.0 - 1.9 % 0.7     Differential Method       Automated     Sodium      136 - 145 mmol/L  140 140   Potassium      3.5 - 5.1 mmol/L  4.0 4.8   Chloride      95 - 110 mmol/L  103 104   CO2      23 - 29 mmol/L  24 25   Glucose      70 - 110 mg/dL  142 (H) 115 (H)   BUN      8 - 23 mg/dL  12 19   Creatinine      0.5 - 1.4 mg/dL  0.8 0.9   Calcium      8.7 - 10.5 mg/dL  9.6 9.9   PROTEIN TOTAL      6.0 - 8.4 g/dL  8.1 8.2   Albumin      3.5 - 5.2 g/dL  3.9 4.2   BILIRUBIN TOTAL       0.1 - 1.0 mg/dL  0.8 0.7   Alkaline Phosphatase      55 - 135 U/L  162 (H) 136 (H)   AST      10 - 40 U/L  45 (H) 39   ALT      10 - 44 U/L  50 (H) 36   Anion Gap      8 - 16 mmol/L  13 11   eGFR if African American      >60 mL/min/1.73 m:2  >60.0 >60.0   eGFR if non African American      >60 mL/min/1.73 m:2  >60.0 >60.0   Cholesterol      120 - 199 mg/dL  165    Triglycerides      30 - 150 mg/dL  189 (H)    HDL      40 - 75 mg/dL  47    LDL Cholesterol External      63.0 - 159.0 mg/dL  80.2    HDL/Cholesterol Ratio      20.0 - 50.0 %  28.5    Total Cholesterol/HDL Ratio      2.0 - 5.0  3.5    Non-HDL Cholesterol      mg/dL  118    Microalbumin, Urine      ug/mL  16.0    Creatinine, Urine      15.0 - 325.0 mg/dL  99.0    MICROALB/CREAT RATIO      0.0 - 30.0 ug/mg  16.2    Hemoglobin A1C External      4.0 - 5.6 %  7.6 (H) 7.1 (H)   Estimated Avg Glucose      68 - 131 mg/dL  171 (H) 157 (H)   Hepatitis C Ab      Negative  Negative

## 2020-12-02 LAB
ALP BONE CFR SERPL: 22 % (ref 28–66)
ALP INTEST CFR SERPL: 5 % (ref 1–24)
ALP ISOS SERPL-IMP: ABNORMAL
ALP LIVER CFR SERPL: 73 % (ref 25–69)
ALP MACROHEPATIC CFR SERPL: ABNORMAL %
ALP PLAC CFR SERPL: 0 %
ALP SERPL-CCNC: 182 U/L (ref 37–153)

## 2020-12-04 ENCOUNTER — TELEPHONE (OUTPATIENT)
Dept: FAMILY MEDICINE | Facility: CLINIC | Age: 75
End: 2020-12-04

## 2020-12-04 RX ORDER — AMLODIPINE BESYLATE 10 MG/1
10 TABLET ORAL DAILY
Qty: 90 TABLET | Refills: 3 | Status: SHIPPED | OUTPATIENT
Start: 2020-12-04 | End: 2021-08-17 | Stop reason: SDUPTHER

## 2020-12-04 NOTE — TELEPHONE ENCOUNTER
----- Message from Kameron Donato MD sent at 12/4/2020 11:59 AM CST -----  Regarding: please call in amlodipine, losartan, ropinarole to pharmacy  Tried several times to renew rx's, 3 times it failed to transmit, please call in amlodipine, ropinarole, and losartan to pharmacy

## 2021-01-15 ENCOUNTER — TELEPHONE (OUTPATIENT)
Dept: FAMILY MEDICINE | Facility: CLINIC | Age: 76
End: 2021-01-15

## 2021-01-15 DIAGNOSIS — R79.89 ELEVATED LFTS: Primary | ICD-10-CM

## 2021-01-15 NOTE — PROGRESS NOTES
Alk-phos isoenzymes show predominance of liver source.  Mild elevated AST and ALT seen previously  No hx of liver imaging.  Would check liver US

## 2021-01-22 ENCOUNTER — HOSPITAL ENCOUNTER (OUTPATIENT)
Dept: RADIOLOGY | Facility: HOSPITAL | Age: 76
Discharge: HOME | End: 2021-01-22
Attending: INTERNAL MEDICINE
Payer: MEDICARE

## 2021-01-22 DIAGNOSIS — R79.89 ELEVATED LFTS: ICD-10-CM

## 2021-01-22 PROBLEM — K80.20 CALCULUS OF GALLBLADDER WITHOUT CHOLECYSTITIS WITHOUT OBSTRUCTION: Status: ACTIVE | Noted: 2021-01-22

## 2021-01-22 PROBLEM — R74.8 ELEVATED ALKALINE PHOSPHATASE LEVEL: Status: ACTIVE | Noted: 2021-01-22

## 2021-01-22 PROCEDURE — 76705 ECHO EXAM OF ABDOMEN: CPT | Mod: TC

## 2021-01-22 PROCEDURE — 76705 ECHO EXAM OF ABDOMEN: CPT | Mod: 26,,, | Performed by: RADIOLOGY

## 2021-01-22 PROCEDURE — 76705 US ABDOMEN LIMITED: ICD-10-PCS | Mod: 26,,, | Performed by: RADIOLOGY

## 2021-02-25 ENCOUNTER — TELEPHONE (OUTPATIENT)
Dept: FAMILY MEDICINE | Facility: CLINIC | Age: 76
End: 2021-02-25

## 2021-03-30 ENCOUNTER — PATIENT OUTREACH (OUTPATIENT)
Dept: ADMINISTRATIVE | Facility: OTHER | Age: 76
End: 2021-03-30

## 2021-04-06 ENCOUNTER — OFFICE VISIT (OUTPATIENT)
Dept: OPTOMETRY | Facility: CLINIC | Age: 76
End: 2021-04-06
Payer: MEDICARE

## 2021-04-06 DIAGNOSIS — H52.7 REFRACTIVE ERRORS: ICD-10-CM

## 2021-04-06 DIAGNOSIS — I69.30 H/O: STROKE WITH RESIDUAL EFFECTS: ICD-10-CM

## 2021-04-06 DIAGNOSIS — E11.36 TYPE 2 DIABETES MELLITUS WITH DIABETIC CATARACT, WITHOUT LONG-TERM CURRENT USE OF INSULIN: Primary | ICD-10-CM

## 2021-04-06 DIAGNOSIS — H25.13 NUCLEAR SCLEROSIS OF BOTH EYES: ICD-10-CM

## 2021-04-06 PROCEDURE — 1101F PT FALLS ASSESS-DOCD LE1/YR: CPT | Mod: CPTII,S$GLB,, | Performed by: OPTOMETRIST

## 2021-04-06 PROCEDURE — 1126F AMNT PAIN NOTED NONE PRSNT: CPT | Mod: S$GLB,,, | Performed by: OPTOMETRIST

## 2021-04-06 PROCEDURE — 1126F PR PAIN SEVERITY QUANTIFIED, NO PAIN PRESENT: ICD-10-PCS | Mod: S$GLB,,, | Performed by: OPTOMETRIST

## 2021-04-06 PROCEDURE — 99499 RISK ADDL DX/OHS AUDIT: ICD-10-PCS | Mod: S$GLB,,, | Performed by: OPTOMETRIST

## 2021-04-06 PROCEDURE — 1101F PR PT FALLS ASSESS DOC 0-1 FALLS W/OUT INJ PAST YR: ICD-10-PCS | Mod: CPTII,S$GLB,, | Performed by: OPTOMETRIST

## 2021-04-06 PROCEDURE — 3288F FALL RISK ASSESSMENT DOCD: CPT | Mod: CPTII,S$GLB,, | Performed by: OPTOMETRIST

## 2021-04-06 PROCEDURE — 92014 PR EYE EXAM, EST PATIENT,COMPREHESV: ICD-10-PCS | Mod: S$GLB,,, | Performed by: OPTOMETRIST

## 2021-04-06 PROCEDURE — 99499 UNLISTED E&M SERVICE: CPT | Mod: S$GLB,,, | Performed by: OPTOMETRIST

## 2021-04-06 PROCEDURE — 99999 PR PBB SHADOW E&M-EST. PATIENT-LVL III: CPT | Mod: PBBFAC,,, | Performed by: OPTOMETRIST

## 2021-04-06 PROCEDURE — 92015 DETERMINE REFRACTIVE STATE: CPT | Mod: S$GLB,,, | Performed by: OPTOMETRIST

## 2021-04-06 PROCEDURE — 92015 PR REFRACTION: ICD-10-PCS | Mod: S$GLB,,, | Performed by: OPTOMETRIST

## 2021-04-06 PROCEDURE — 3288F PR FALLS RISK ASSESSMENT DOCUMENTED: ICD-10-PCS | Mod: CPTII,S$GLB,, | Performed by: OPTOMETRIST

## 2021-04-06 PROCEDURE — 92014 COMPRE OPH EXAM EST PT 1/>: CPT | Mod: S$GLB,,, | Performed by: OPTOMETRIST

## 2021-04-06 PROCEDURE — 99999 PR PBB SHADOW E&M-EST. PATIENT-LVL III: ICD-10-PCS | Mod: PBBFAC,,, | Performed by: OPTOMETRIST

## 2021-04-14 ENCOUNTER — LAB VISIT (OUTPATIENT)
Dept: LAB | Facility: HOSPITAL | Age: 76
End: 2021-04-14
Attending: INTERNAL MEDICINE
Payer: MEDICARE

## 2021-04-14 DIAGNOSIS — E11.9 DIABETES MELLITUS TYPE 2 WITHOUT RETINOPATHY: ICD-10-CM

## 2021-04-14 LAB
CHOLEST SERPL-MCNC: 167 MG/DL (ref 120–199)
CHOLEST/HDLC SERPL: 3.3 {RATIO} (ref 2–5)
HDLC SERPL-MCNC: 50 MG/DL (ref 40–75)
HDLC SERPL: 29.9 % (ref 20–50)
LDLC SERPL CALC-MCNC: 79.4 MG/DL (ref 63–159)
NONHDLC SERPL-MCNC: 117 MG/DL
TRIGL SERPL-MCNC: 188 MG/DL (ref 30–150)

## 2021-04-14 PROCEDURE — 80061 LIPID PANEL: CPT | Performed by: INTERNAL MEDICINE

## 2021-04-14 PROCEDURE — 36415 COLL VENOUS BLD VENIPUNCTURE: CPT | Mod: PO | Performed by: INTERNAL MEDICINE

## 2021-04-20 RX ORDER — ORAL SEMAGLUTIDE 7 MG/1
7 TABLET ORAL DAILY
Qty: 30 TABLET | Refills: 5 | Status: CANCELLED | OUTPATIENT
Start: 2021-04-20

## 2021-04-21 ENCOUNTER — OFFICE VISIT (OUTPATIENT)
Dept: FAMILY MEDICINE | Facility: CLINIC | Age: 76
End: 2021-04-21
Payer: MEDICARE

## 2021-04-21 VITALS
HEIGHT: 63 IN | OXYGEN SATURATION: 99 % | SYSTOLIC BLOOD PRESSURE: 120 MMHG | WEIGHT: 157.19 LBS | BODY MASS INDEX: 27.85 KG/M2 | DIASTOLIC BLOOD PRESSURE: 60 MMHG | TEMPERATURE: 98 F | HEART RATE: 77 BPM

## 2021-04-21 DIAGNOSIS — E11.9 DIABETES MELLITUS TYPE 2 WITHOUT RETINOPATHY: Primary | ICD-10-CM

## 2021-04-21 DIAGNOSIS — D50.9 MICROCYTIC ANEMIA: ICD-10-CM

## 2021-04-21 DIAGNOSIS — R00.2 PALPITATIONS: ICD-10-CM

## 2021-04-21 DIAGNOSIS — I10 ESSENTIAL HYPERTENSION: ICD-10-CM

## 2021-04-21 DIAGNOSIS — E78.2 MIXED HYPERLIPIDEMIA: ICD-10-CM

## 2021-04-21 DIAGNOSIS — R53.83 FATIGUE, UNSPECIFIED TYPE: ICD-10-CM

## 2021-04-21 DIAGNOSIS — E11.9 TYPE 2 DIABETES MELLITUS WITHOUT COMPLICATION, WITHOUT LONG-TERM CURRENT USE OF INSULIN: ICD-10-CM

## 2021-04-21 PROCEDURE — 99214 PR OFFICE/OUTPT VISIT, EST, LEVL IV, 30-39 MIN: ICD-10-PCS | Mod: S$GLB,,, | Performed by: INTERNAL MEDICINE

## 2021-04-21 PROCEDURE — 1159F PR MEDICATION LIST DOCUMENTED IN MEDICAL RECORD: ICD-10-PCS | Mod: S$GLB,,, | Performed by: INTERNAL MEDICINE

## 2021-04-21 PROCEDURE — 3288F PR FALLS RISK ASSESSMENT DOCUMENTED: ICD-10-PCS | Mod: CPTII,S$GLB,, | Performed by: INTERNAL MEDICINE

## 2021-04-21 PROCEDURE — 3074F PR MOST RECENT SYSTOLIC BLOOD PRESSURE < 130 MM HG: ICD-10-PCS | Mod: CPTII,S$GLB,, | Performed by: INTERNAL MEDICINE

## 2021-04-21 PROCEDURE — 99999 PR PBB SHADOW E&M-EST. PATIENT-LVL V: CPT | Mod: PBBFAC,,, | Performed by: INTERNAL MEDICINE

## 2021-04-21 PROCEDURE — 1101F PR PT FALLS ASSESS DOC 0-1 FALLS W/OUT INJ PAST YR: ICD-10-PCS | Mod: CPTII,S$GLB,, | Performed by: INTERNAL MEDICINE

## 2021-04-21 PROCEDURE — 1126F PR PAIN SEVERITY QUANTIFIED, NO PAIN PRESENT: ICD-10-PCS | Mod: S$GLB,,, | Performed by: INTERNAL MEDICINE

## 2021-04-21 PROCEDURE — 3074F SYST BP LT 130 MM HG: CPT | Mod: CPTII,S$GLB,, | Performed by: INTERNAL MEDICINE

## 2021-04-21 PROCEDURE — 1126F AMNT PAIN NOTED NONE PRSNT: CPT | Mod: S$GLB,,, | Performed by: INTERNAL MEDICINE

## 2021-04-21 PROCEDURE — 3288F FALL RISK ASSESSMENT DOCD: CPT | Mod: CPTII,S$GLB,, | Performed by: INTERNAL MEDICINE

## 2021-04-21 PROCEDURE — 3078F PR MOST RECENT DIASTOLIC BLOOD PRESSURE < 80 MM HG: ICD-10-PCS | Mod: CPTII,S$GLB,, | Performed by: INTERNAL MEDICINE

## 2021-04-21 PROCEDURE — 3052F PR MOST RECENT HEMOGLOBIN A1C LEVEL 8.0 - < 9.0%: ICD-10-PCS | Mod: CPTII,S$GLB,, | Performed by: INTERNAL MEDICINE

## 2021-04-21 PROCEDURE — 1159F MED LIST DOCD IN RCRD: CPT | Mod: S$GLB,,, | Performed by: INTERNAL MEDICINE

## 2021-04-21 PROCEDURE — 99214 OFFICE O/P EST MOD 30 MIN: CPT | Mod: S$GLB,,, | Performed by: INTERNAL MEDICINE

## 2021-04-21 PROCEDURE — 1101F PT FALLS ASSESS-DOCD LE1/YR: CPT | Mod: CPTII,S$GLB,, | Performed by: INTERNAL MEDICINE

## 2021-04-21 PROCEDURE — 3078F DIAST BP <80 MM HG: CPT | Mod: CPTII,S$GLB,, | Performed by: INTERNAL MEDICINE

## 2021-04-21 PROCEDURE — 99499 UNLISTED E&M SERVICE: CPT | Mod: S$GLB,,, | Performed by: INTERNAL MEDICINE

## 2021-04-21 PROCEDURE — 99999 PR PBB SHADOW E&M-EST. PATIENT-LVL V: ICD-10-PCS | Mod: PBBFAC,,, | Performed by: INTERNAL MEDICINE

## 2021-04-21 PROCEDURE — 3052F HG A1C>EQUAL 8.0%<EQUAL 9.0%: CPT | Mod: CPTII,S$GLB,, | Performed by: INTERNAL MEDICINE

## 2021-04-21 PROCEDURE — 99499 RISK ADDL DX/OHS AUDIT: ICD-10-PCS | Mod: S$GLB,,, | Performed by: INTERNAL MEDICINE

## 2021-04-21 RX ORDER — ORAL SEMAGLUTIDE 3 MG/1
3 TABLET ORAL DAILY
Qty: 30 TABLET | Refills: 2 | Status: SHIPPED | OUTPATIENT
Start: 2021-04-21 | End: 2021-08-17 | Stop reason: DRUGHIGH

## 2021-08-12 ENCOUNTER — LAB VISIT (OUTPATIENT)
Dept: LAB | Facility: HOSPITAL | Age: 76
End: 2021-08-12
Attending: INTERNAL MEDICINE
Payer: MEDICARE

## 2021-08-12 DIAGNOSIS — R53.83 FATIGUE, UNSPECIFIED TYPE: ICD-10-CM

## 2021-08-12 DIAGNOSIS — E11.9 TYPE 2 DIABETES MELLITUS WITHOUT COMPLICATION, WITHOUT LONG-TERM CURRENT USE OF INSULIN: ICD-10-CM

## 2021-08-12 LAB
ALBUMIN SERPL BCP-MCNC: 3.9 G/DL (ref 3.5–5.2)
ALP SERPL-CCNC: 110 U/L (ref 55–135)
ALT SERPL W/O P-5'-P-CCNC: 28 U/L (ref 10–44)
ANION GAP SERPL CALC-SCNC: 12 MMOL/L (ref 8–16)
AST SERPL-CCNC: 35 U/L (ref 10–40)
BASOPHILS # BLD AUTO: 0.07 K/UL (ref 0–0.2)
BASOPHILS NFR BLD: 0.9 % (ref 0–1.9)
BILIRUB SERPL-MCNC: 0.8 MG/DL (ref 0.1–1)
BUN SERPL-MCNC: 14 MG/DL (ref 8–23)
CALCIUM SERPL-MCNC: 9.4 MG/DL (ref 8.7–10.5)
CHLORIDE SERPL-SCNC: 107 MMOL/L (ref 95–110)
CO2 SERPL-SCNC: 22 MMOL/L (ref 23–29)
CREAT SERPL-MCNC: 0.8 MG/DL (ref 0.5–1.4)
DIFFERENTIAL METHOD: ABNORMAL
EOSINOPHIL # BLD AUTO: 0.1 K/UL (ref 0–0.5)
EOSINOPHIL NFR BLD: 1.4 % (ref 0–8)
ERYTHROCYTE [DISTWIDTH] IN BLOOD BY AUTOMATED COUNT: 15.9 % (ref 11.5–14.5)
EST. GFR  (AFRICAN AMERICAN): >60 ML/MIN/1.73 M^2
EST. GFR  (NON AFRICAN AMERICAN): >60 ML/MIN/1.73 M^2
ESTIMATED AVG GLUCOSE: 154 MG/DL (ref 68–131)
GLUCOSE SERPL-MCNC: 152 MG/DL (ref 70–110)
HBA1C MFR BLD: 7 % (ref 4–5.6)
HCT VFR BLD AUTO: 40.5 % (ref 37–48.5)
HGB BLD-MCNC: 11.7 G/DL (ref 12–16)
IMM GRANULOCYTES # BLD AUTO: 0.01 K/UL (ref 0–0.04)
IMM GRANULOCYTES NFR BLD AUTO: 0.1 % (ref 0–0.5)
LYMPHOCYTES # BLD AUTO: 3.6 K/UL (ref 1–4.8)
LYMPHOCYTES NFR BLD: 47.3 % (ref 18–48)
MCH RBC QN AUTO: 20.5 PG (ref 27–31)
MCHC RBC AUTO-ENTMCNC: 28.9 G/DL (ref 32–36)
MCV RBC AUTO: 71 FL (ref 82–98)
MONOCYTES # BLD AUTO: 0.5 K/UL (ref 0.3–1)
MONOCYTES NFR BLD: 6.6 % (ref 4–15)
NEUTROPHILS # BLD AUTO: 3.4 K/UL (ref 1.8–7.7)
NEUTROPHILS NFR BLD: 43.7 % (ref 38–73)
NRBC BLD-RTO: 0 /100 WBC
PLATELET # BLD AUTO: 297 K/UL (ref 150–450)
PMV BLD AUTO: 10.2 FL (ref 9.2–12.9)
POTASSIUM SERPL-SCNC: 4.1 MMOL/L (ref 3.5–5.1)
PROT SERPL-MCNC: 7.8 G/DL (ref 6–8.4)
RBC # BLD AUTO: 5.7 M/UL (ref 4–5.4)
SODIUM SERPL-SCNC: 141 MMOL/L (ref 136–145)
TSH SERPL DL<=0.005 MIU/L-ACNC: 1.56 UIU/ML (ref 0.4–4)
WBC # BLD AUTO: 7.68 K/UL (ref 3.9–12.7)

## 2021-08-12 PROCEDURE — 85025 COMPLETE CBC W/AUTO DIFF WBC: CPT | Performed by: INTERNAL MEDICINE

## 2021-08-12 PROCEDURE — 83036 HEMOGLOBIN GLYCOSYLATED A1C: CPT | Performed by: INTERNAL MEDICINE

## 2021-08-12 PROCEDURE — 84443 ASSAY THYROID STIM HORMONE: CPT | Performed by: INTERNAL MEDICINE

## 2021-08-12 PROCEDURE — 36415 COLL VENOUS BLD VENIPUNCTURE: CPT | Mod: PO | Performed by: INTERNAL MEDICINE

## 2021-08-12 PROCEDURE — 80053 COMPREHEN METABOLIC PANEL: CPT | Performed by: INTERNAL MEDICINE

## 2021-08-17 ENCOUNTER — OFFICE VISIT (OUTPATIENT)
Dept: FAMILY MEDICINE | Facility: CLINIC | Age: 76
End: 2021-08-17
Payer: MEDICARE

## 2021-08-17 VITALS
SYSTOLIC BLOOD PRESSURE: 118 MMHG | OXYGEN SATURATION: 98 % | DIASTOLIC BLOOD PRESSURE: 58 MMHG | HEART RATE: 71 BPM | TEMPERATURE: 98 F | WEIGHT: 153.88 LBS | HEIGHT: 63 IN | BODY MASS INDEX: 27.27 KG/M2

## 2021-08-17 DIAGNOSIS — E11.9 DIABETES MELLITUS TYPE 2 WITHOUT RETINOPATHY: ICD-10-CM

## 2021-08-17 DIAGNOSIS — D50.9 MICROCYTIC ANEMIA: ICD-10-CM

## 2021-08-17 DIAGNOSIS — J31.0 NONALLERGIC RHINITIS: ICD-10-CM

## 2021-08-17 DIAGNOSIS — E11.9 TYPE 2 DIABETES MELLITUS WITHOUT COMPLICATION, WITHOUT LONG-TERM CURRENT USE OF INSULIN: Primary | ICD-10-CM

## 2021-08-17 DIAGNOSIS — I10 ESSENTIAL HYPERTENSION: ICD-10-CM

## 2021-08-17 PROCEDURE — 99999 PR PBB SHADOW E&M-EST. PATIENT-LVL IV: ICD-10-PCS | Mod: PBBFAC,,, | Performed by: INTERNAL MEDICINE

## 2021-08-17 PROCEDURE — 99214 PR OFFICE/OUTPT VISIT, EST, LEVL IV, 30-39 MIN: ICD-10-PCS | Mod: S$GLB,,, | Performed by: INTERNAL MEDICINE

## 2021-08-17 PROCEDURE — 1126F PR PAIN SEVERITY QUANTIFIED, NO PAIN PRESENT: ICD-10-PCS | Mod: CPTII,S$GLB,, | Performed by: INTERNAL MEDICINE

## 2021-08-17 PROCEDURE — 1101F PR PT FALLS ASSESS DOC 0-1 FALLS W/OUT INJ PAST YR: ICD-10-PCS | Mod: CPTII,S$GLB,, | Performed by: INTERNAL MEDICINE

## 2021-08-17 PROCEDURE — 3051F HG A1C>EQUAL 7.0%<8.0%: CPT | Mod: CPTII,S$GLB,, | Performed by: INTERNAL MEDICINE

## 2021-08-17 PROCEDURE — 3078F DIAST BP <80 MM HG: CPT | Mod: CPTII,S$GLB,, | Performed by: INTERNAL MEDICINE

## 2021-08-17 PROCEDURE — 1159F MED LIST DOCD IN RCRD: CPT | Mod: CPTII,S$GLB,, | Performed by: INTERNAL MEDICINE

## 2021-08-17 PROCEDURE — 99214 OFFICE O/P EST MOD 30 MIN: CPT | Mod: S$GLB,,, | Performed by: INTERNAL MEDICINE

## 2021-08-17 PROCEDURE — 1126F AMNT PAIN NOTED NONE PRSNT: CPT | Mod: CPTII,S$GLB,, | Performed by: INTERNAL MEDICINE

## 2021-08-17 PROCEDURE — 1101F PT FALLS ASSESS-DOCD LE1/YR: CPT | Mod: CPTII,S$GLB,, | Performed by: INTERNAL MEDICINE

## 2021-08-17 PROCEDURE — 99999 PR PBB SHADOW E&M-EST. PATIENT-LVL IV: CPT | Mod: PBBFAC,,, | Performed by: INTERNAL MEDICINE

## 2021-08-17 PROCEDURE — 3288F PR FALLS RISK ASSESSMENT DOCUMENTED: ICD-10-PCS | Mod: CPTII,S$GLB,, | Performed by: INTERNAL MEDICINE

## 2021-08-17 PROCEDURE — 3288F FALL RISK ASSESSMENT DOCD: CPT | Mod: CPTII,S$GLB,, | Performed by: INTERNAL MEDICINE

## 2021-08-17 PROCEDURE — 1160F RVW MEDS BY RX/DR IN RCRD: CPT | Mod: CPTII,S$GLB,, | Performed by: INTERNAL MEDICINE

## 2021-08-17 PROCEDURE — 1160F PR REVIEW ALL MEDS BY PRESCRIBER/CLIN PHARMACIST DOCUMENTED: ICD-10-PCS | Mod: CPTII,S$GLB,, | Performed by: INTERNAL MEDICINE

## 2021-08-17 PROCEDURE — 99499 RISK ADDL DX/OHS AUDIT: ICD-10-PCS | Mod: S$GLB,,, | Performed by: INTERNAL MEDICINE

## 2021-08-17 PROCEDURE — 99499 UNLISTED E&M SERVICE: CPT | Mod: S$GLB,,, | Performed by: INTERNAL MEDICINE

## 2021-08-17 PROCEDURE — 1159F PR MEDICATION LIST DOCUMENTED IN MEDICAL RECORD: ICD-10-PCS | Mod: CPTII,S$GLB,, | Performed by: INTERNAL MEDICINE

## 2021-08-17 PROCEDURE — 3074F PR MOST RECENT SYSTOLIC BLOOD PRESSURE < 130 MM HG: ICD-10-PCS | Mod: CPTII,S$GLB,, | Performed by: INTERNAL MEDICINE

## 2021-08-17 PROCEDURE — 3074F SYST BP LT 130 MM HG: CPT | Mod: CPTII,S$GLB,, | Performed by: INTERNAL MEDICINE

## 2021-08-17 PROCEDURE — 3078F PR MOST RECENT DIASTOLIC BLOOD PRESSURE < 80 MM HG: ICD-10-PCS | Mod: CPTII,S$GLB,, | Performed by: INTERNAL MEDICINE

## 2021-08-17 PROCEDURE — 3051F PR MOST RECENT HEMOGLOBIN A1C LEVEL 7.0 - < 8.0%: ICD-10-PCS | Mod: CPTII,S$GLB,, | Performed by: INTERNAL MEDICINE

## 2021-08-17 RX ORDER — MONTELUKAST SODIUM 10 MG/1
10 TABLET ORAL NIGHTLY
Qty: 30 TABLET | Refills: 5 | Status: SHIPPED | OUTPATIENT
Start: 2021-08-17 | End: 2021-09-16

## 2021-08-17 RX ORDER — METHOCARBAMOL 750 MG/1
TABLET, FILM COATED ORAL
COMMUNITY
Start: 2021-06-14 | End: 2022-02-17 | Stop reason: ALTCHOICE

## 2021-08-17 RX ORDER — AMLODIPINE BESYLATE 10 MG/1
10 TABLET ORAL DAILY
Qty: 90 TABLET | Refills: 3 | Status: SHIPPED | OUTPATIENT
Start: 2021-08-17 | End: 2022-02-17 | Stop reason: SDUPTHER

## 2021-08-17 RX ORDER — ORAL SEMAGLUTIDE 7 MG/1
7 TABLET ORAL DAILY
Qty: 90 TABLET | Refills: 1 | Status: SHIPPED | OUTPATIENT
Start: 2021-08-17 | End: 2022-02-17

## 2021-12-07 ENCOUNTER — PES CALL (OUTPATIENT)
Dept: ADMINISTRATIVE | Facility: CLINIC | Age: 76
End: 2021-12-07
Payer: MEDICARE

## 2022-02-01 DIAGNOSIS — I10 ESSENTIAL HYPERTENSION: ICD-10-CM

## 2022-02-01 NOTE — TELEPHONE ENCOUNTER
Care Due:                  Date            Visit Type   Department     Provider  --------------------------------------------------------------------------------                                [facility]                              PRIMARY      MED/ INTERNAL  Last Visit: 08-      CARE ([facility])   MED/ ADYS      Kameron Donato                              [facility]                              PRIMARY      MED/ INTERNAL  Next Visit: 02-      CARE ([facility])   MED/ PEDS      Kameron Donato                                                            Last  Test          Frequency    Reason                     Performed    Due Date  --------------------------------------------------------------------------------    HBA1C.......  6 months...  semaglutide..............  08- 02-    Lipid Panel.  12 months..  atorvastatin.............  Not Found    Overdue    Powered by [employer]. Reference number: [id number].   2/01/2022 3:44:19 PM CST

## 2022-02-08 RX ORDER — LOSARTAN POTASSIUM 100 MG/1
TABLET ORAL
Qty: 90 TABLET | Refills: 2 | Status: SHIPPED | OUTPATIENT
Start: 2022-02-08 | End: 2022-02-17 | Stop reason: SDUPTHER

## 2022-02-09 NOTE — TELEPHONE ENCOUNTER
Refill Authorization Note   Ramya Mayo  is requesting a refill authorization.  Brief Assessment and Rationale for Refill:  Approve     Medication Therapy Plan:  FLOS    Medication Reconciliation Completed: No   Comments:   --->Care Gap information included below if applicable.   Orders Placed This Encounter    losartan (COZAAR) 100 MG tablet      Requested Prescriptions   Signed Prescriptions Disp Refills    losartan (COZAAR) 100 MG tablet 90 tablet 2     Sig: Take 1 tablet by mouth once daily       Cardiovascular:  Angiotensin Receptor Blockers Passed - 2/1/2022  3:43 PM        Passed - Patient is at least 18 years old        Passed - Last BP in normal range within 360 days     BP Readings from Last 1 Encounters:   08/17/21 (!) 118/58               Passed - Valid encounter within last 15 months     Recent Visits  Date Type Provider Dept   08/17/21 Office Visit [facility]   04/21/21 Office Visit [facility]   12/01/20 Office Visit [facility]   Showing recent visits within past 720 days and meeting all other requirements  Future Appointments  No visits were found meeting these conditions.  Showing future appointments within next 150 days and meeting all other requirements      Future Appointments              In 3 days [facility] [place]    In 3 days [facility], [facility]    [facility]                Passed - Cr is 1.39 or below and within 360 days     Lab Results   Component Value Date    CREATININE 0.8 08/12/2021    CREATININE 0.8 04/14/2021    CREATININE 0.8 11/23/2020              Passed - K is 5.2 or below and within 360 days     Potassium   Date Value Ref Range Status   08/12/2021 4.1 3.5 - 5.1 mmol/L Final   04/14/2021 4.7 3.5 - 5.1 mmol/L Final   11/23/2020 4.0 3.5 - 5.1 mmol/L Final               Passed - eGFR within 360 days     Lab Results   Component Value Date    EGFRNONAA >60 08/12/2021    EGFRNONAA >60.0 04/14/2021    EGFRNONAA >60.0 11/23/2020                    Appointments  past 12m or future 3m with PCP    Date Provider   Last Visit   8/17/2021 Kameron Donato MD   Next Visit   2/17/2022 Kameron Donato MD   ED visits in past 90 days: 0     Note composed:9:54 PM 02/08/2022

## 2022-02-11 ENCOUNTER — LAB VISIT (OUTPATIENT)
Dept: LAB | Facility: HOSPITAL | Age: 77
End: 2022-02-11
Attending: INTERNAL MEDICINE
Payer: MEDICARE

## 2022-02-11 DIAGNOSIS — E11.9 DIABETES MELLITUS TYPE 2 WITHOUT RETINOPATHY: ICD-10-CM

## 2022-02-11 LAB
ALBUMIN SERPL BCP-MCNC: 4 G/DL (ref 3.5–5.2)
ALP SERPL-CCNC: 129 U/L (ref 55–135)
ALT SERPL W/O P-5'-P-CCNC: 32 U/L (ref 10–44)
ANION GAP SERPL CALC-SCNC: 14 MMOL/L (ref 8–16)
AST SERPL-CCNC: 41 U/L (ref 10–40)
BASOPHILS # BLD AUTO: 0.07 K/UL (ref 0–0.2)
BASOPHILS NFR BLD: 0.7 % (ref 0–1.9)
BILIRUB SERPL-MCNC: 1 MG/DL (ref 0.1–1)
BUN SERPL-MCNC: 12 MG/DL (ref 8–23)
CALCIUM SERPL-MCNC: 9.7 MG/DL (ref 8.7–10.5)
CHLORIDE SERPL-SCNC: 101 MMOL/L (ref 95–110)
CHOLEST SERPL-MCNC: 142 MG/DL (ref 120–199)
CHOLEST/HDLC SERPL: 3.1 {RATIO} (ref 2–5)
CO2 SERPL-SCNC: 26 MMOL/L (ref 23–29)
CREAT SERPL-MCNC: 0.9 MG/DL (ref 0.5–1.4)
DIFFERENTIAL METHOD: ABNORMAL
EOSINOPHIL # BLD AUTO: 0.1 K/UL (ref 0–0.5)
EOSINOPHIL NFR BLD: 0.9 % (ref 0–8)
ERYTHROCYTE [DISTWIDTH] IN BLOOD BY AUTOMATED COUNT: 14.3 % (ref 11.5–14.5)
EST. GFR  (AFRICAN AMERICAN): >60 ML/MIN/1.73 M^2
EST. GFR  (NON AFRICAN AMERICAN): >60 ML/MIN/1.73 M^2
ESTIMATED AVG GLUCOSE: 263 MG/DL (ref 68–131)
GLUCOSE SERPL-MCNC: 287 MG/DL (ref 70–110)
HBA1C MFR BLD: 10.8 % (ref 4–5.6)
HCT VFR BLD AUTO: 41.6 % (ref 37–48.5)
HDLC SERPL-MCNC: 46 MG/DL (ref 40–75)
HDLC SERPL: 32.4 % (ref 20–50)
HGB BLD-MCNC: 12.1 G/DL (ref 12–16)
IMM GRANULOCYTES # BLD AUTO: 0.02 K/UL (ref 0–0.04)
IMM GRANULOCYTES NFR BLD AUTO: 0.2 % (ref 0–0.5)
LDLC SERPL CALC-MCNC: 64.4 MG/DL (ref 63–159)
LYMPHOCYTES # BLD AUTO: 3.9 K/UL (ref 1–4.8)
LYMPHOCYTES NFR BLD: 40 % (ref 18–48)
MCH RBC QN AUTO: 20.8 PG (ref 27–31)
MCHC RBC AUTO-ENTMCNC: 29.1 G/DL (ref 32–36)
MCV RBC AUTO: 72 FL (ref 82–98)
MONOCYTES # BLD AUTO: 0.6 K/UL (ref 0.3–1)
MONOCYTES NFR BLD: 5.8 % (ref 4–15)
NEUTROPHILS # BLD AUTO: 5.1 K/UL (ref 1.8–7.7)
NEUTROPHILS NFR BLD: 52.4 % (ref 38–73)
NONHDLC SERPL-MCNC: 96 MG/DL
NRBC BLD-RTO: 0 /100 WBC
PLATELET # BLD AUTO: 298 K/UL (ref 150–450)
PMV BLD AUTO: 10.7 FL (ref 9.2–12.9)
POTASSIUM SERPL-SCNC: 4.2 MMOL/L (ref 3.5–5.1)
PROT SERPL-MCNC: 8 G/DL (ref 6–8.4)
RBC # BLD AUTO: 5.82 M/UL (ref 4–5.4)
SODIUM SERPL-SCNC: 141 MMOL/L (ref 136–145)
TRIGL SERPL-MCNC: 158 MG/DL (ref 30–150)
WBC # BLD AUTO: 9.71 K/UL (ref 3.9–12.7)

## 2022-02-11 PROCEDURE — 85025 COMPLETE CBC W/AUTO DIFF WBC: CPT | Performed by: INTERNAL MEDICINE

## 2022-02-11 PROCEDURE — 80061 LIPID PANEL: CPT | Performed by: INTERNAL MEDICINE

## 2022-02-11 PROCEDURE — 80053 COMPREHEN METABOLIC PANEL: CPT | Performed by: INTERNAL MEDICINE

## 2022-02-11 PROCEDURE — 83036 HEMOGLOBIN GLYCOSYLATED A1C: CPT | Performed by: INTERNAL MEDICINE

## 2022-02-11 PROCEDURE — 36415 COLL VENOUS BLD VENIPUNCTURE: CPT | Mod: PO | Performed by: INTERNAL MEDICINE

## 2022-02-17 ENCOUNTER — OFFICE VISIT (OUTPATIENT)
Dept: FAMILY MEDICINE | Facility: CLINIC | Age: 77
End: 2022-02-17
Payer: MEDICARE

## 2022-02-17 VITALS
OXYGEN SATURATION: 99 % | WEIGHT: 152 LBS | SYSTOLIC BLOOD PRESSURE: 146 MMHG | TEMPERATURE: 98 F | HEART RATE: 98 BPM | BODY MASS INDEX: 26.93 KG/M2 | HEIGHT: 63 IN | DIASTOLIC BLOOD PRESSURE: 70 MMHG

## 2022-02-17 DIAGNOSIS — I69.30 H/O: STROKE WITH RESIDUAL EFFECTS: ICD-10-CM

## 2022-02-17 DIAGNOSIS — G25.81 RESTLESS LEGS SYNDROME: ICD-10-CM

## 2022-02-17 DIAGNOSIS — F43.20 ADJUSTMENT DISORDER, UNSPECIFIED TYPE: ICD-10-CM

## 2022-02-17 DIAGNOSIS — E11.9 TYPE 2 DIABETES MELLITUS WITHOUT COMPLICATION, WITHOUT LONG-TERM CURRENT USE OF INSULIN: Primary | ICD-10-CM

## 2022-02-17 DIAGNOSIS — E78.2 MIXED HYPERLIPIDEMIA: ICD-10-CM

## 2022-02-17 DIAGNOSIS — I10 ESSENTIAL HYPERTENSION: ICD-10-CM

## 2022-02-17 DIAGNOSIS — Z78.0 ASYMPTOMATIC MENOPAUSAL STATE: ICD-10-CM

## 2022-02-17 PROBLEM — I35.0 NONRHEUMATIC AORTIC VALVE STENOSIS: Status: ACTIVE | Noted: 2018-06-06

## 2022-02-17 PROCEDURE — 1126F PR PAIN SEVERITY QUANTIFIED, NO PAIN PRESENT: ICD-10-PCS | Mod: CPTII,S$GLB,, | Performed by: INTERNAL MEDICINE

## 2022-02-17 PROCEDURE — 3078F DIAST BP <80 MM HG: CPT | Mod: CPTII,S$GLB,, | Performed by: INTERNAL MEDICINE

## 2022-02-17 PROCEDURE — 1159F MED LIST DOCD IN RCRD: CPT | Mod: CPTII,S$GLB,, | Performed by: INTERNAL MEDICINE

## 2022-02-17 PROCEDURE — 99999 PR PBB SHADOW E&M-EST. PATIENT-LVL IV: CPT | Mod: PBBFAC,,, | Performed by: INTERNAL MEDICINE

## 2022-02-17 PROCEDURE — 99214 PR OFFICE/OUTPT VISIT, EST, LEVL IV, 30-39 MIN: ICD-10-PCS | Mod: S$GLB,,, | Performed by: INTERNAL MEDICINE

## 2022-02-17 PROCEDURE — 3078F PR MOST RECENT DIASTOLIC BLOOD PRESSURE < 80 MM HG: ICD-10-PCS | Mod: CPTII,S$GLB,, | Performed by: INTERNAL MEDICINE

## 2022-02-17 PROCEDURE — 3077F SYST BP >= 140 MM HG: CPT | Mod: CPTII,S$GLB,, | Performed by: INTERNAL MEDICINE

## 2022-02-17 PROCEDURE — 1126F AMNT PAIN NOTED NONE PRSNT: CPT | Mod: CPTII,S$GLB,, | Performed by: INTERNAL MEDICINE

## 2022-02-17 PROCEDURE — 3077F PR MOST RECENT SYSTOLIC BLOOD PRESSURE >= 140 MM HG: ICD-10-PCS | Mod: CPTII,S$GLB,, | Performed by: INTERNAL MEDICINE

## 2022-02-17 PROCEDURE — 3288F PR FALLS RISK ASSESSMENT DOCUMENTED: ICD-10-PCS | Mod: CPTII,S$GLB,, | Performed by: INTERNAL MEDICINE

## 2022-02-17 PROCEDURE — 1159F PR MEDICATION LIST DOCUMENTED IN MEDICAL RECORD: ICD-10-PCS | Mod: CPTII,S$GLB,, | Performed by: INTERNAL MEDICINE

## 2022-02-17 PROCEDURE — 3288F FALL RISK ASSESSMENT DOCD: CPT | Mod: CPTII,S$GLB,, | Performed by: INTERNAL MEDICINE

## 2022-02-17 PROCEDURE — 99999 PR PBB SHADOW E&M-EST. PATIENT-LVL IV: ICD-10-PCS | Mod: PBBFAC,,, | Performed by: INTERNAL MEDICINE

## 2022-02-17 PROCEDURE — 1160F PR REVIEW ALL MEDS BY PRESCRIBER/CLIN PHARMACIST DOCUMENTED: ICD-10-PCS | Mod: CPTII,S$GLB,, | Performed by: INTERNAL MEDICINE

## 2022-02-17 PROCEDURE — 1101F PR PT FALLS ASSESS DOC 0-1 FALLS W/OUT INJ PAST YR: ICD-10-PCS | Mod: CPTII,S$GLB,, | Performed by: INTERNAL MEDICINE

## 2022-02-17 PROCEDURE — 1101F PT FALLS ASSESS-DOCD LE1/YR: CPT | Mod: CPTII,S$GLB,, | Performed by: INTERNAL MEDICINE

## 2022-02-17 PROCEDURE — 99214 OFFICE O/P EST MOD 30 MIN: CPT | Mod: S$GLB,,, | Performed by: INTERNAL MEDICINE

## 2022-02-17 PROCEDURE — 1160F RVW MEDS BY RX/DR IN RCRD: CPT | Mod: CPTII,S$GLB,, | Performed by: INTERNAL MEDICINE

## 2022-02-17 RX ORDER — ROPINIROLE 1 MG/1
1 TABLET, FILM COATED ORAL 2 TIMES DAILY PRN
Qty: 180 TABLET | Refills: 3 | Status: SHIPPED | OUTPATIENT
Start: 2022-02-17 | End: 2022-05-26 | Stop reason: SDUPTHER

## 2022-02-17 RX ORDER — LOSARTAN POTASSIUM 100 MG/1
100 TABLET ORAL DAILY
Qty: 90 TABLET | Refills: 3 | Status: SHIPPED | OUTPATIENT
Start: 2022-02-17 | End: 2022-05-26 | Stop reason: SDUPTHER

## 2022-02-17 RX ORDER — ASPIRIN 81 MG/1
81 TABLET ORAL DAILY
Qty: 90 TABLET | Refills: 3 | Status: SHIPPED | OUTPATIENT
Start: 2022-02-17 | End: 2023-09-28

## 2022-02-17 RX ORDER — METFORMIN HYDROCHLORIDE 500 MG/1
1000 TABLET, EXTENDED RELEASE ORAL 2 TIMES DAILY WITH MEALS
Qty: 360 TABLET | Refills: 3 | Status: SHIPPED | OUTPATIENT
Start: 2022-02-17 | End: 2022-05-26 | Stop reason: SDUPTHER

## 2022-02-17 RX ORDER — ATORVASTATIN CALCIUM 40 MG/1
40 TABLET, FILM COATED ORAL DAILY
Qty: 90 TABLET | Refills: 3 | Status: SHIPPED | OUTPATIENT
Start: 2022-02-17 | End: 2022-05-26 | Stop reason: SDUPTHER

## 2022-02-17 RX ORDER — AMLODIPINE BESYLATE 10 MG/1
10 TABLET ORAL DAILY
Qty: 90 TABLET | Refills: 3 | Status: SHIPPED | OUTPATIENT
Start: 2022-02-17 | End: 2022-05-26 | Stop reason: SDUPTHER

## 2022-02-17 RX ORDER — METOPROLOL SUCCINATE 100 MG/1
100 TABLET, EXTENDED RELEASE ORAL DAILY
Qty: 90 TABLET | Refills: 3 | Status: SHIPPED | OUTPATIENT
Start: 2022-02-17 | End: 2022-05-26 | Stop reason: SDUPTHER

## 2022-02-17 RX ORDER — GLIPIZIDE 10 MG/1
10 TABLET, FILM COATED, EXTENDED RELEASE ORAL
Qty: 90 TABLET | Refills: 3 | Status: SHIPPED | OUTPATIENT
Start: 2022-02-17 | End: 2022-05-26 | Stop reason: SDUPTHER

## 2022-02-17 RX ORDER — ORAL SEMAGLUTIDE 3 MG/1
3 TABLET ORAL DAILY
Qty: 90 TABLET | Refills: 3 | Status: SHIPPED | OUTPATIENT
Start: 2022-02-17 | End: 2022-05-26 | Stop reason: SDUPTHER

## 2022-02-17 RX ORDER — TRAZODONE HYDROCHLORIDE 50 MG/1
25 TABLET ORAL NIGHTLY PRN
Qty: 30 TABLET | Refills: 11 | Status: SHIPPED | OUTPATIENT
Start: 2022-02-17 | End: 2022-05-26 | Stop reason: SDUPTHER

## 2022-02-17 NOTE — PROGRESS NOTES
This note was created by combination of typed  and M-Modal dictation.  Transcription errors may be present.  If there are any questions, please contact me.    Assessment and Plan:   Type 2 diabetes mellitus without complication, without long-term current use of insulin; januvia expensive;1/2020 actos  -high-dose Rybelsus with GI side effects.  Taking glipizide and metformin.  Had stopped the Actos on my instruction  Will try to keep regimen simple  Restart Rybelsus at 3.  Stay on metformin and glipizide stay off Actos  Would like to keep A1c below 8  If > 8 restart actos  -     semaglutide (RYBELSUS) 3 mg tablet; Take 1 tablet (3 mg total) by mouth once daily. Lower dose  Dispense: 90 tablet; Refill: 3  -     glipiZIDE (GLUCOTROL) 10 MG TR24; Take 1 tablet (10 mg total) by mouth daily with breakfast.  Dispense: 90 tablet; Refill: 3  -     metFORMIN (GLUCOPHAGE-XR) 500 MG ER 24hr tablet; Take 2 tablets (1,000 mg total) by mouth 2 (two) times daily with meals.  Dispense: 360 tablet; Refill: 3  -     aspirin (ECOTRIN) 81 MG EC tablet; Take 1 tablet (81 mg total) by mouth once daily.  Dispense: 90 tablet; Refill: 3  -     Comprehensive Metabolic Panel; Future; Expected date: 05/18/2022  -     Hemoglobin A1C; Future; Expected date: 05/18/2022    Mixed hyperlipidemia; 7/2018 prava to atorva  -previous labs lipid profile good on Lipitor no changes.  Taking aspirin 325, may reduce to 81.  Diabetes, history of stroke  -     atorvastatin (LIPITOR) 40 MG tablet; Take 1 tablet (40 mg total) by mouth once daily.  Dispense: 90 tablet; Refill: 3  -     aspirin (ECOTRIN) 81 MG EC tablet; Take 1 tablet (81 mg total) by mouth once daily.  Dispense: 90 tablet; Refill: 3    Essential hypertension  -reports home blood pressure readings are better than in the office.  Has some degree of white coat syndrome.  Now no changes, continue home monitoring, amlodipine, losartan, metoprolol  -     amLODIPine (NORVASC) 10 MG tablet; Take  1 tablet (10 mg total) by mouth once daily.  Dispense: 90 tablet; Refill: 3  -     metoprolol succinate (TOPROL-XL) 100 MG 24 hr tablet; Take 1 tablet (100 mg total) by mouth once daily.  Dispense: 90 tablet; Refill: 3  -     losartan (COZAAR) 100 MG tablet; Take 1 tablet (100 mg total) by mouth once daily.  Dispense: 90 tablet; Refill: 3    Adjustment disorder, unspecified type  -p.r.n. use of trazodone.  Does not use regularly.  Updated prescription  -     traZODone (DESYREL) 50 MG tablet; Take 0.5 tablets (25 mg total) by mouth nightly as needed for Insomnia.  Dispense: 30 tablet; Refill: 11    Restless legs syndrome  -.  His liver panel, does not take regularly but when she does she takes it twice a day.  Refilled/updated  -     rOPINIRole (REQUIP) 1 MG tablet; Take 1 tablet (1 mg total) by mouth 2 (two) times daily as needed (restless legs).  Dispense: 180 tablet; Refill: 3    H/O: stroke with residual effects  -no focal neurologic deficits today.  On aspirin 325 reduce to 81  -     aspirin (ECOTRIN) 81 MG EC tablet; Take 1 tablet (81 mg total) by mouth once daily.  Dispense: 90 tablet; Refill: 3    Asymptomatic menopausal state  -check DEXA scan, normal creatinine can support bisphosphonate if indicated  -     DXA Bone Density Spine And Hip; Future; Expected date: 02/17/2022    Patient declines flu shot today    Medications Discontinued During This Encounter   Medication Reason    semaglutide (RYBELSUS) 7 mg tablet     methocarbamoL (ROBAXIN) 750 MG Tab Therapy completed    aspirin 325 MG tablet Dose adjustment    traZODone (DESYREL) 50 MG tablet Reorder    atorvastatin (LIPITOR) 40 MG tablet Reorder    glipiZIDE (GLUCOTROL) 10 MG TR24 Reorder    metFORMIN (GLUCOPHAGE-XR) 500 MG ER 24hr tablet Reorder    metoprolol succinate (TOPROL-XL) 100 MG 24 hr tablet Reorder    rOPINIRole (REQUIP) 1 MG tablet Reorder    amLODIPine (NORVASC) 10 MG tablet Reorder    losartan (COZAAR) 100 MG tablet Reorder        meds sent this encounter:  Medications Ordered This Encounter   Medications    amLODIPine (NORVASC) 10 MG tablet     Sig: Take 1 tablet (10 mg total) by mouth once daily.     Dispense:  90 tablet     Refill:  3          aspirin (ECOTRIN) 81 MG EC tablet     Sig: Take 1 tablet (81 mg total) by mouth once daily.     Dispense:  90 tablet     Refill:  3    atorvastatin (LIPITOR) 40 MG tablet     Sig: Take 1 tablet (40 mg total) by mouth once daily.     Dispense:  90 tablet     Refill:  3    glipiZIDE (GLUCOTROL) 10 MG TR24     Sig: Take 1 tablet (10 mg total) by mouth daily with breakfast.     Dispense:  90 tablet     Refill:  3    losartan (COZAAR) 100 MG tablet     Sig: Take 1 tablet (100 mg total) by mouth once daily.     Dispense:  90 tablet     Refill:  3     .    metFORMIN (GLUCOPHAGE-XR) 500 MG ER 24hr tablet     Sig: Take 2 tablets (1,000 mg total) by mouth 2 (two) times daily with meals.     Dispense:  360 tablet     Refill:  3     Stop the regular release metformin    metoprolol succinate (TOPROL-XL) 100 MG 24 hr tablet     Sig: Take 1 tablet (100 mg total) by mouth once daily.     Dispense:  90 tablet     Refill:  3     .    rOPINIRole (REQUIP) 1 MG tablet     Sig: Take 1 tablet (1 mg total) by mouth 2 (two) times daily as needed (restless legs).     Dispense:  180 tablet     Refill:  3    semaglutide (RYBELSUS) 3 mg tablet     Sig: Take 1 tablet (3 mg total) by mouth once daily. Lower dose     Dispense:  90 tablet     Refill:  3    traZODone (DESYREL) 50 MG tablet     Sig: Take 0.5 tablets (25 mg total) by mouth nightly as needed for Insomnia.     Dispense:  30 tablet     Refill:  11       Follow Up: No follow-ups on file. OV 3 months.     Subjective:     Chief Complaint   Patient presents with    Follow-up    Hypertension       HPI  Ramya is a 76 y.o. female, last appointment with this clinic was Visit date not found.  Social History     Tobacco Use    Smoking status: Never Smoker     Smokeless tobacco: Never Used   Substance Use Topics    Alcohol use: No          Social History     Social History Narrative    Lives in [place].    2014.         No LMP recorded. Patient has had a hysterectomy.    Last visit with me in August  Diabetes, metformin, glipizide, increased Rybelsus, stop Actos  Allergies, prefers pills rather than sprays, trial of Singulair    Pre visit labs  CBC microcytic anemia likely underlying thalassemia  CMP mildly elevated LFTs  Lipid profile triglycerides mildly elevated non HDL less than 100  A1c 10.8 from previous 7.0    No recent fills on semaglutide  Recent fills on Actos in mid September    BP high on intake.  Reports white coat syndrome. Last check last week. No readings for review.  Taking meds as prescribed.     rybelsus at higher dose with nausea.  So can't take it. So not taking it.    Lower dose was ok.  Tolerated 3 mg.   Will restart    No complaints otherwise.  Murmur today.  History of echo in  with trivial to mild aortic stenosis.  Patient denies dizziness, lightheadedness, presyncope    Patient Care Team:  Kameron Donato MD as PCP - General (Internal Medicine)  Melina Parmar MA as Care Coordinator  Tessa Baltazar OD as Consulting Physician (Optometry)    Patient Active Problem List    Diagnosis Date Noted    Calculus of gallbladder without cholecystitis without obstruction incidental on RUQ US 2021 RUQ US: Cholelithiasis.  No gallbladder wall thickening or pericholecystic fluid.  Negative sonographic Taylor sign.      Elevated alkaline phosphatase level, liver fraction, 2021 RUQ US normal except incidental gallstones 2021    Microcytic anemia mentzer index < 13, suspect this is underlying thalassemia. 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  Review of patient's allergies indicates:   Allergen Reactions    Citalopram analogues Other (See Comments)     Hallucinations       Medication List with Changes/Refills   Current Medications    AMLODIPINE (NORVASC) 10 MG TABLET    Take 1 tablet (10 mg total) by mouth once daily.    ASPIRIN 325 MG TABLET    Take 325 mg by mouth. 1 Tablet Oral Every day    ATORVASTATIN (LIPITOR) 40 MG TABLET    Take 1 tablet (40 mg total) by mouth once daily.    BLOOD SUGAR DIAGNOSTIC (TRUE METRIX GLUCOSE TEST STRIP) STRP    Daily glucose testing; whichever brand covered by insurance.    GLIPIZIDE (GLUCOTROL) 10 MG TR24    Take 1 tablet (10 mg total) by mouth daily with breakfast.    LANCETS (ONETOUCH DELICA LANCETS) 33 GAUGE MISC    1 lancet by Misc.(Non-Drug; Combo Route) route once daily. ONCE DAILY BLOOD SUGAR TESTING; WHICHEVER BRAND COVERED BY INSURANCE    LOSARTAN (COZAAR) 100 MG TABLET    Take 1 tablet by mouth once daily    METFORMIN (GLUCOPHAGE-XR) 500 MG ER 24HR TABLET    Take 2 tablets (1,000 mg total) by mouth 2 (two) times daily with meals.    METHOCARBAMOL (ROBAXIN) 750 MG TAB    take 1 TABLET(S) BY MOUTH EVERY 6 hours AS NEEDED FOR MUSCLE SPASMS. MAY CAUSE DROWSINESS    METOPROLOL SUCCINATE (TOPROL-XL) 100 MG 24 HR TABLET    Take 1 tablet (100 mg total) by mouth once daily.    MULTIVIT-MIN-FERROUS GLUCONATE 9 MG IRON/15 ML ORAL LIQUID    Take by mouth. 1 Liquid Oral Every day     "ROPINIROLE (REQUIP) 1 MG TABLET    Take 1 tablet (1 mg total) by mouth 3 (three) times daily.    SEMAGLUTIDE (RYBELSUS) 7 MG TABLET    Take 1 tablet (7 mg total) by mouth once daily. Stop the actos.    TRAZODONE (DESYREL) 50 MG TABLET    Take 0.5 tablets (25 mg total) by mouth every evening.    TRIAMCINOLONE ACETONIDE 0.1% (KENALOG) 0.1 % CREAM    Apply topically 2 (two) times daily.        Objective:   Physical Exam   Vitals:    02/17/22 1429 02/17/22 1449   BP: (!) 152/68 (!) 146/70   BP Location:  Left arm   Patient Position:  Sitting   BP Method:  Medium (Manual)   Pulse: 98    Temp: 97.9 °F (36.6 °C)    TempSrc: Oral    SpO2: 99%    Weight: 68.9 kg (152 lb 0.1 oz)    Height: 5' 3" (1.6 m)     Body mass index is 26.93 kg/m².  Weight: 68.9 kg (152 lb 0.1 oz)   Height: 5' 3" (160 cm)     Physical Exam  Constitutional:       General: She is not in acute distress.     Appearance: She is well-developed and well-nourished.   Eyes:      Extraocular Movements: EOM normal.   Cardiovascular:      Rate and Rhythm: Normal rate and regular rhythm.      Heart sounds: Murmur heard.        Comments: 2/6 systolic murmur radiating to carotids  Pulmonary:      Effort: Pulmonary effort is normal.      Breath sounds: Normal breath sounds.   Musculoskeletal:         General: Normal range of motion.      Right lower leg: No edema.      Left lower leg: No edema.   Skin:     General: Skin is warm and dry.   Neurological:      Mental Status: She is alert and oriented to person, place, and time.      Coordination: Coordination normal.   Psychiatric:         Mood and Affect: Mood and affect normal.         Behavior: Behavior normal.         Thought Content: Thought content normal.         Component      Latest Ref Rng & Units 2/11/2022 8/12/2021   WBC      3.90 - 12.70 K/uL 9.71 7.68   RBC      4.00 - 5.40 M/uL 5.82 (H) 5.70 (H)   Hemoglobin      12.0 - 16.0 g/dL 12.1 11.7 (L)   Hematocrit      37.0 - 48.5 % 41.6 40.5   MCV      82 - 98 fL " 72 (L) 71 (L)   MCH      27.0 - 31.0 pg 20.8 (L) 20.5 (L)   MCHC      32.0 - 36.0 g/dL 29.1 (L) 28.9 (L)   RDW      11.5 - 14.5 % 14.3 15.9 (H)   Platelets      150 - 450 K/uL 298 297   MPV      9.2 - 12.9 fL 10.7 10.2   Immature Granulocytes      0.0 - 0.5 % 0.2 0.1   Gran # (ANC)      1.8 - 7.7 K/uL 5.1 3.4   Immature Grans (Abs)      0.00 - 0.04 K/uL 0.02 0.01   Lymph #      1.0 - 4.8 K/uL 3.9 3.6   Mono #      0.3 - 1.0 K/uL 0.6 0.5   Eos #      0.0 - 0.5 K/uL 0.1 0.1   Baso #      0.00 - 0.20 K/uL 0.07 0.07   nRBC      0 /100 WBC 0 0   Gran %      38.0 - 73.0 % 52.4 43.7   Lymph %      18.0 - 48.0 % 40.0 47.3   Mono %      4.0 - 15.0 % 5.8 6.6   Eosinophil %      0.0 - 8.0 % 0.9 1.4   Basophil %      0.0 - 1.9 % 0.7 0.9   Differential Method       Automated Automated   Sodium      136 - 145 mmol/L 141 141   Potassium      3.5 - 5.1 mmol/L 4.2 4.1   Chloride      95 - 110 mmol/L 101 107   CO2      23 - 29 mmol/L 26 22 (L)   Glucose      70 - 110 mg/dL 287 (H) 152 (H)   BUN      8 - 23 mg/dL 12 14   Creatinine      0.5 - 1.4 mg/dL 0.9 0.8   Calcium      8.7 - 10.5 mg/dL 9.7 9.4   PROTEIN TOTAL      6.0 - 8.4 g/dL 8.0 7.8   Albumin      3.5 - 5.2 g/dL 4.0 3.9   BILIRUBIN TOTAL      0.1 - 1.0 mg/dL 1.0 0.8   Alkaline Phosphatase      55 - 135 U/L 129 110   AST      10 - 40 U/L 41 (H) 35   ALT      10 - 44 U/L 32 28   Anion Gap      8 - 16 mmol/L 14 12   eGFR if African American      >60 mL/min/1.73 m:2 >60.0 >60   eGFR if non African American      >60 mL/min/1.73 m:2 >60.0 >60   Cholesterol      120 - 199 mg/dL 142    Triglycerides      30 - 150 mg/dL 158 (H)    HDL      40 - 75 mg/dL 46    LDL Cholesterol External      63.0 - 159.0 mg/dL 64.4    HDL/Cholesterol Ratio      20.0 - 50.0 % 32.4    Total Cholesterol/HDL Ratio      2.0 - 5.0 3.1    Non-HDL Cholesterol      mg/dL 96    Microalbumin, Urine      ug/mL 6.0    Creatinine, Urine      15.0 - 325.0 mg/dL 50.0    MICROALB/CREAT RATIO      0.0 - 30.0 ug/mg 12.0     Hemoglobin A1C External      4.0 - 5.6 % 10.8 (H) 7.0 (H)   Estimated Avg Glucose      68 - 131 mg/dL 263 (H) 154 (H)   TSH      0.400 - 4.000 uIU/mL  1.557

## 2022-03-02 ENCOUNTER — TELEPHONE (OUTPATIENT)
Dept: FAMILY MEDICINE | Facility: CLINIC | Age: 77
End: 2022-03-02
Payer: MEDICARE

## 2022-03-02 ENCOUNTER — PES CALL (OUTPATIENT)
Dept: ADMINISTRATIVE | Facility: CLINIC | Age: 77
End: 2022-03-02
Payer: MEDICARE

## 2022-03-02 NOTE — TELEPHONE ENCOUNTER
----- Message from Kala Sparks sent at 3/2/2022 12:06 PM CST -----  Type:  Patient Returning Call    Who Called: self    Who Left Message for Patient:  Celeste    Does the patient know what this is regarding?: yes    Would the patient rather a call back or a response via [facility]? call    Best Call Back Number.[phone] (home)

## 2022-03-02 NOTE — TELEPHONE ENCOUNTER
Tried calling patient to schedule a bp check with the nurse. The patient didn't answer the phone but lvm so patient can call back.

## 2022-03-08 ENCOUNTER — HOSPITAL ENCOUNTER (OUTPATIENT)
Dept: RADIOLOGY | Facility: CLINIC | Age: 77
Discharge: HOME | End: 2022-03-08
Attending: INTERNAL MEDICINE
Payer: MEDICARE

## 2022-03-08 ENCOUNTER — CLINICAL SUPPORT (OUTPATIENT)
Dept: FAMILY MEDICINE | Facility: CLINIC | Age: 77
End: 2022-03-08
Payer: MEDICARE

## 2022-03-08 VITALS — HEART RATE: 73 BPM | SYSTOLIC BLOOD PRESSURE: 140 MMHG | DIASTOLIC BLOOD PRESSURE: 68 MMHG

## 2022-03-08 DIAGNOSIS — Z78.0 ASYMPTOMATIC MENOPAUSAL STATE: ICD-10-CM

## 2022-03-08 PROCEDURE — 99999 PR PBB SHADOW E&M-EST. PATIENT-LVL II: ICD-10-PCS | Mod: PBBFAC,,,

## 2022-03-08 PROCEDURE — 77080 DXA BONE DENSITY AXIAL: CPT | Mod: TC,PO

## 2022-03-08 PROCEDURE — 77080 DEXA BONE DENSITY SPINE HIP: ICD-10-PCS | Mod: 26,,, | Performed by: INTERNAL MEDICINE

## 2022-03-08 PROCEDURE — 99999 PR PBB SHADOW E&M-EST. PATIENT-LVL II: CPT | Mod: PBBFAC,,,

## 2022-03-08 PROCEDURE — 77080 DXA BONE DENSITY AXIAL: CPT | Mod: 26,,, | Performed by: INTERNAL MEDICINE

## 2022-03-08 NOTE — PROGRESS NOTES
Ramya Mayo 76 y.o. female is here today for Blood Pressure check.   History of HTN yes.    Review of patient's allergies indicates:   Allergen Reactions    Citalopram analogues Other (See Comments)     Hallucinations     Creatinine   Date Value Ref Range Status   02/11/2022 0.9 0.5 - 1.4 mg/dL Final     Sodium   Date Value Ref Range Status   02/11/2022 141 136 - 145 mmol/L Final     Potassium   Date Value Ref Range Status   02/11/2022 4.2 3.5 - 5.1 mmol/L Final   ]  Patient verifies taking blood pressure medications on a regular basis at the same time of the day.     Current Outpatient Medications:     amLODIPine (NORVASC) 10 MG tablet, Take 1 tablet (10 mg total) by mouth once daily., Disp: 90 tablet, Rfl: 3    aspirin (ECOTRIN) 81 MG EC tablet, Take 1 tablet (81 mg total) by mouth once daily., Disp: 90 tablet, Rfl: 3    atorvastatin (LIPITOR) 40 MG tablet, Take 1 tablet (40 mg total) by mouth once daily., Disp: 90 tablet, Rfl: 3    blood sugar diagnostic (TRUE METRIX GLUCOSE TEST STRIP) Strp, Daily glucose testing; whichever brand covered by insurance., Disp: 100 strip, Rfl: 11    glipiZIDE (GLUCOTROL) 10 MG TR24, Take 1 tablet (10 mg total) by mouth daily with breakfast., Disp: 90 tablet, Rfl: 3    lancets (ONETOUCH DELICA LANCETS) 33 gauge Misc, 1 lancet by Misc.(Non-Drug; Combo Route) route once daily. ONCE DAILY BLOOD SUGAR TESTING; WHICHEVER BRAND COVERED BY INSURANCE, Disp: 30 each, Rfl: 11    losartan (COZAAR) 100 MG tablet, Take 1 tablet (100 mg total) by mouth once daily., Disp: 90 tablet, Rfl: 3    metFORMIN (GLUCOPHAGE-XR) 500 MG ER 24hr tablet, Take 2 tablets (1,000 mg total) by mouth 2 (two) times daily with meals., Disp: 360 tablet, Rfl: 3    metoprolol succinate (TOPROL-XL) 100 MG 24 hr tablet, Take 1 tablet (100 mg total) by mouth once daily., Disp: 90 tablet, Rfl: 3    multivit-min-ferrous gluconate 9 mg iron/15 mL oral liquid, Take by mouth. 1 Liquid Oral Every day, Disp: ,  Rfl:     rOPINIRole (REQUIP) 1 MG tablet, Take 1 tablet (1 mg total) by mouth 2 (two) times daily as needed (restless legs)., Disp: 180 tablet, Rfl: 3    semaglutide (RYBELSUS) 3 mg tablet, Take 1 tablet (3 mg total) by mouth once daily. Lower dose, Disp: 90 tablet, Rfl: 3    traZODone (DESYREL) 50 MG tablet, Take 0.5 tablets (25 mg total) by mouth nightly as needed for Insomnia., Disp: 30 tablet, Rfl: 11    triamcinolone acetonide 0.1% (KENALOG) 0.1 % cream, Apply topically 2 (two) times daily. (Patient not taking: Reported on 2/17/2022), Disp: 45 g, Rfl: 5  Does patient have record of home blood pressure readings yes. Readings have been averaging 120's/80's.   Last dose of blood pressure medication was taken at  12 noon.  Patient is asymptomatic.       Vitals:    03/08/22 1336   BP: (!) 148/68   BP Location: Left arm   Patient Position: Sitting   BP Method: Medium (Manual)   Pulse: 73         Dr. Donato informed of nurse visit.

## 2022-03-10 ENCOUNTER — PES CALL (OUTPATIENT)
Dept: ADMINISTRATIVE | Facility: CLINIC | Age: 77
End: 2022-03-10
Payer: MEDICARE

## 2022-03-15 NOTE — PROGRESS NOTES
03/14/2022 DEXA L-spine 0.2, total hip 0.7, femoral neck 0.1, FRAX score 0.4/3.7.  Normal BMD.    Results to pt.  No changes.

## 2022-05-17 ENCOUNTER — LAB VISIT (OUTPATIENT)
Dept: LAB | Facility: HOSPITAL | Age: 77
End: 2022-05-17
Attending: INTERNAL MEDICINE
Payer: MEDICARE

## 2022-05-17 DIAGNOSIS — E11.9 TYPE 2 DIABETES MELLITUS WITHOUT COMPLICATION, WITHOUT LONG-TERM CURRENT USE OF INSULIN: ICD-10-CM

## 2022-05-17 LAB
ALBUMIN SERPL BCP-MCNC: 4.1 G/DL (ref 3.5–5.2)
ALP SERPL-CCNC: 117 U/L (ref 55–135)
ALT SERPL W/O P-5'-P-CCNC: 32 U/L (ref 10–44)
ANION GAP SERPL CALC-SCNC: 10 MMOL/L (ref 8–16)
AST SERPL-CCNC: 34 U/L (ref 10–40)
BILIRUB SERPL-MCNC: 0.9 MG/DL (ref 0.1–1)
BUN SERPL-MCNC: 13 MG/DL (ref 8–23)
CALCIUM SERPL-MCNC: 9.4 MG/DL (ref 8.7–10.5)
CHLORIDE SERPL-SCNC: 103 MMOL/L (ref 95–110)
CO2 SERPL-SCNC: 24 MMOL/L (ref 23–29)
CREAT SERPL-MCNC: 0.8 MG/DL (ref 0.5–1.4)
EST. GFR  (AFRICAN AMERICAN): >60 ML/MIN/1.73 M^2
EST. GFR  (NON AFRICAN AMERICAN): >60 ML/MIN/1.73 M^2
ESTIMATED AVG GLUCOSE: 177 MG/DL (ref 68–131)
GLUCOSE SERPL-MCNC: 209 MG/DL (ref 70–110)
HBA1C MFR BLD: 7.8 % (ref 4–5.6)
POTASSIUM SERPL-SCNC: 4.1 MMOL/L (ref 3.5–5.1)
PROT SERPL-MCNC: 8.1 G/DL (ref 6–8.4)
SODIUM SERPL-SCNC: 137 MMOL/L (ref 136–145)

## 2022-05-17 PROCEDURE — 80053 COMPREHEN METABOLIC PANEL: CPT | Performed by: INTERNAL MEDICINE

## 2022-05-17 PROCEDURE — 83036 HEMOGLOBIN GLYCOSYLATED A1C: CPT | Performed by: INTERNAL MEDICINE

## 2022-05-17 PROCEDURE — 36415 COLL VENOUS BLD VENIPUNCTURE: CPT | Mod: PO | Performed by: INTERNAL MEDICINE

## 2022-05-26 ENCOUNTER — OFFICE VISIT (OUTPATIENT)
Dept: FAMILY MEDICINE | Facility: CLINIC | Age: 77
End: 2022-05-26
Payer: MEDICARE

## 2022-05-26 ENCOUNTER — TELEPHONE (OUTPATIENT)
Dept: FAMILY MEDICINE | Facility: CLINIC | Age: 77
End: 2022-05-26

## 2022-05-26 VITALS
BODY MASS INDEX: 26.93 KG/M2 | DIASTOLIC BLOOD PRESSURE: 62 MMHG | OXYGEN SATURATION: 98 % | SYSTOLIC BLOOD PRESSURE: 146 MMHG | HEART RATE: 83 BPM | TEMPERATURE: 98 F | HEIGHT: 63 IN

## 2022-05-26 DIAGNOSIS — F43.20 ADJUSTMENT DISORDER, UNSPECIFIED TYPE: ICD-10-CM

## 2022-05-26 DIAGNOSIS — E11.9 TYPE 2 DIABETES MELLITUS WITHOUT COMPLICATION, WITHOUT LONG-TERM CURRENT USE OF INSULIN: Primary | ICD-10-CM

## 2022-05-26 DIAGNOSIS — E78.2 MIXED HYPERLIPIDEMIA: ICD-10-CM

## 2022-05-26 DIAGNOSIS — I10 ESSENTIAL HYPERTENSION: ICD-10-CM

## 2022-05-26 DIAGNOSIS — G25.81 RESTLESS LEGS SYNDROME: ICD-10-CM

## 2022-05-26 PROCEDURE — 99499 UNLISTED E&M SERVICE: CPT | Mod: S$GLB,,, | Performed by: INTERNAL MEDICINE

## 2022-05-26 PROCEDURE — 99499 RISK ADDL DX/OHS AUDIT: ICD-10-PCS | Mod: S$GLB,,, | Performed by: INTERNAL MEDICINE

## 2022-05-26 PROCEDURE — 99999 PR PBB SHADOW E&M-EST. PATIENT-LVL IV: CPT | Mod: PBBFAC,,, | Performed by: INTERNAL MEDICINE

## 2022-05-26 PROCEDURE — 3051F PR MOST RECENT HEMOGLOBIN A1C LEVEL 7.0 - < 8.0%: ICD-10-PCS | Mod: CPTII,S$GLB,, | Performed by: INTERNAL MEDICINE

## 2022-05-26 PROCEDURE — 1126F AMNT PAIN NOTED NONE PRSNT: CPT | Mod: CPTII,S$GLB,, | Performed by: INTERNAL MEDICINE

## 2022-05-26 PROCEDURE — 1159F MED LIST DOCD IN RCRD: CPT | Mod: CPTII,S$GLB,, | Performed by: INTERNAL MEDICINE

## 2022-05-26 PROCEDURE — 99999 PR PBB SHADOW E&M-EST. PATIENT-LVL IV: ICD-10-PCS | Mod: PBBFAC,,, | Performed by: INTERNAL MEDICINE

## 2022-05-26 PROCEDURE — 1160F PR REVIEW ALL MEDS BY PRESCRIBER/CLIN PHARMACIST DOCUMENTED: ICD-10-PCS | Mod: CPTII,S$GLB,, | Performed by: INTERNAL MEDICINE

## 2022-05-26 PROCEDURE — 1159F PR MEDICATION LIST DOCUMENTED IN MEDICAL RECORD: ICD-10-PCS | Mod: CPTII,S$GLB,, | Performed by: INTERNAL MEDICINE

## 2022-05-26 PROCEDURE — 99214 PR OFFICE/OUTPT VISIT, EST, LEVL IV, 30-39 MIN: ICD-10-PCS | Mod: S$GLB,,, | Performed by: INTERNAL MEDICINE

## 2022-05-26 PROCEDURE — 3051F HG A1C>EQUAL 7.0%<8.0%: CPT | Mod: CPTII,S$GLB,, | Performed by: INTERNAL MEDICINE

## 2022-05-26 PROCEDURE — 99214 OFFICE O/P EST MOD 30 MIN: CPT | Mod: S$GLB,,, | Performed by: INTERNAL MEDICINE

## 2022-05-26 PROCEDURE — 3078F PR MOST RECENT DIASTOLIC BLOOD PRESSURE < 80 MM HG: ICD-10-PCS | Mod: CPTII,S$GLB,, | Performed by: INTERNAL MEDICINE

## 2022-05-26 PROCEDURE — 3077F SYST BP >= 140 MM HG: CPT | Mod: CPTII,S$GLB,, | Performed by: INTERNAL MEDICINE

## 2022-05-26 PROCEDURE — 3288F FALL RISK ASSESSMENT DOCD: CPT | Mod: CPTII,S$GLB,, | Performed by: INTERNAL MEDICINE

## 2022-05-26 PROCEDURE — 3078F DIAST BP <80 MM HG: CPT | Mod: CPTII,S$GLB,, | Performed by: INTERNAL MEDICINE

## 2022-05-26 PROCEDURE — 1160F RVW MEDS BY RX/DR IN RCRD: CPT | Mod: CPTII,S$GLB,, | Performed by: INTERNAL MEDICINE

## 2022-05-26 PROCEDURE — 1101F PR PT FALLS ASSESS DOC 0-1 FALLS W/OUT INJ PAST YR: ICD-10-PCS | Mod: CPTII,S$GLB,, | Performed by: INTERNAL MEDICINE

## 2022-05-26 PROCEDURE — 3288F PR FALLS RISK ASSESSMENT DOCUMENTED: ICD-10-PCS | Mod: CPTII,S$GLB,, | Performed by: INTERNAL MEDICINE

## 2022-05-26 PROCEDURE — 3077F PR MOST RECENT SYSTOLIC BLOOD PRESSURE >= 140 MM HG: ICD-10-PCS | Mod: CPTII,S$GLB,, | Performed by: INTERNAL MEDICINE

## 2022-05-26 PROCEDURE — 1101F PT FALLS ASSESS-DOCD LE1/YR: CPT | Mod: CPTII,S$GLB,, | Performed by: INTERNAL MEDICINE

## 2022-05-26 PROCEDURE — 1126F PR PAIN SEVERITY QUANTIFIED, NO PAIN PRESENT: ICD-10-PCS | Mod: CPTII,S$GLB,, | Performed by: INTERNAL MEDICINE

## 2022-05-26 RX ORDER — GLIPIZIDE 10 MG/1
10 TABLET, FILM COATED, EXTENDED RELEASE ORAL
Qty: 90 TABLET | Refills: 3 | Status: SHIPPED | OUTPATIENT
Start: 2022-05-26 | End: 2023-06-19 | Stop reason: SDUPTHER

## 2022-05-26 RX ORDER — ROPINIROLE 1 MG/1
1 TABLET, FILM COATED ORAL 2 TIMES DAILY PRN
Qty: 180 TABLET | Refills: 3 | Status: SHIPPED | OUTPATIENT
Start: 2022-05-26 | End: 2023-06-19 | Stop reason: ALTCHOICE

## 2022-05-26 RX ORDER — METOPROLOL SUCCINATE 100 MG/1
100 TABLET, EXTENDED RELEASE ORAL DAILY
Qty: 90 TABLET | Refills: 3 | Status: SHIPPED | OUTPATIENT
Start: 2022-05-26 | End: 2023-08-01

## 2022-05-26 RX ORDER — TRAZODONE HYDROCHLORIDE 50 MG/1
25 TABLET ORAL NIGHTLY PRN
Qty: 30 TABLET | Refills: 11 | Status: SHIPPED | OUTPATIENT
Start: 2022-05-26

## 2022-05-26 RX ORDER — LOSARTAN POTASSIUM 100 MG/1
100 TABLET ORAL DAILY
Qty: 90 TABLET | Refills: 3 | Status: SHIPPED | OUTPATIENT
Start: 2022-05-26 | End: 2023-07-17

## 2022-05-26 RX ORDER — AMLODIPINE BESYLATE 10 MG/1
10 TABLET ORAL DAILY
Qty: 90 TABLET | Refills: 3 | Status: SHIPPED | OUTPATIENT
Start: 2022-05-26 | End: 2022-10-05

## 2022-05-26 RX ORDER — ORAL SEMAGLUTIDE 3 MG/1
3 TABLET ORAL DAILY
Qty: 90 TABLET | Refills: 3 | Status: SHIPPED | OUTPATIENT
Start: 2022-05-26 | End: 2022-12-19

## 2022-05-26 RX ORDER — METFORMIN HYDROCHLORIDE 500 MG/1
1000 TABLET, EXTENDED RELEASE ORAL 2 TIMES DAILY WITH MEALS
Qty: 360 TABLET | Refills: 3 | Status: SHIPPED | OUTPATIENT
Start: 2022-05-26 | End: 2023-05-14

## 2022-05-26 RX ORDER — ATORVASTATIN CALCIUM 40 MG/1
40 TABLET, FILM COATED ORAL DAILY
Qty: 90 TABLET | Refills: 3 | Status: SHIPPED | OUTPATIENT
Start: 2022-05-26 | End: 2023-05-01

## 2022-05-26 NOTE — PATIENT INSTRUCTIONS
PLEASE GET YOUR SECOND COVID BOOSTER AT [employer]    PLEASE GET YOUR SHINGLES SHOTS AT [employer]    PLEASE CHECK BP.  IF BP STILL HIGH I MAY INCREASE YOUR METOPROLOL.     BP GOAL IS TO BE BELOW 140/90

## 2022-05-26 NOTE — TELEPHONE ENCOUNTER
Please call pt Friday 5/27 - she has a home BP cuff - what is her BP?    Please send back to me with reply

## 2022-05-26 NOTE — PROGRESS NOTES
This note was created by combination of typed  and M-Modal dictation.  Transcription errors may be present.  If there are any questions, please contact me.    Assessment and Plan:   Type 2 diabetes mellitus without complication, without long-term current use of insulin; januvia expensive;1/2020 actos  -pre visit labs better, not quite at 7 but goal is to remain below 8. On max tolerated dose of oral semaglutide, max dose of metformin and max dose of glipizide  No changes  Check future labs  Up-to-date microalbumin screening  -     glipiZIDE (GLUCOTROL) 10 MG TR24; Take 1 tablet (10 mg total) by mouth daily with breakfast.  Dispense: 90 tablet; Refill: 3  -     metFORMIN (GLUCOPHAGE-XR) 500 MG ER 24hr tablet; Take 2 tablets (1,000 mg total) by mouth 2 (two) times daily with meals.  Dispense: 360 tablet; Refill: 3  -     semaglutide (RYBELSUS) 3 mg tablet; Take 1 tablet (3 mg total) by mouth once daily. Lower dose  Dispense: 90 tablet; Refill: 3  -     CBC Auto Differential; Future; Expected date: 08/26/2022  -     Comprehensive Metabolic Panel; Future; Expected date: 08/26/2022  -     Hemoglobin A1C; Future; Expected date: 08/26/2022    Mixed hyperlipidemia; 7/2018 prava to atorva  -labs done in February stable on Lipitor no changes  -     atorvastatin (LIPITOR) 40 MG tablet; Take 1 tablet (40 mg total) by mouth once daily.  Dispense: 90 tablet; Refill: 3    Essential hypertension  -she thinks a component of white coat syndrome.  Refilled medications.  She lives an hour away from clinic so I do not want to bring her back in just for a nurse visit for BP check.  I have asked staff to call her tomorrow and see what her blood pressure is.  If remains above 140/80 increase the metoprolol.  We talked about carvedilol but she does not want to take twice daily medication.  -     amLODIPine (NORVASC) 10 MG tablet; Take 1 tablet (10 mg total) by mouth once daily.  Dispense: 90 tablet; Refill: 3  -     losartan  (COZAAR) 100 MG tablet; Take 1 tablet (100 mg total) by mouth once daily.  Dispense: 90 tablet; Refill: 3  -     metoprolol succinate (TOPROL-XL) 100 MG 24 hr tablet; Take 1 tablet (100 mg total) by mouth once daily.  Dispense: 90 tablet; Refill: 3    Restless legs syndrome  -refilled ropinirole  -     rOPINIRole (REQUIP) 1 MG tablet; Take 1 tablet (1 mg total) by mouth 2 (two) times daily as needed (restless legs).  Dispense: 180 tablet; Refill: 3    Adjustment disorder, unspecified type  -takes trazodone p.r.n..  Refilled  -     traZODone (DESYREL) 50 MG tablet; Take 0.5 tablets (25 mg total) by mouth nightly as needed for Insomnia.  Dispense: 30 tablet; Refill: 11    Encouraged to get shingrix series and covid 2nd booster at pharmacy      Medications Discontinued During This Encounter   Medication Reason    triamcinolone acetonide 0.1% (KENALOG) 0.1 % cream Patient no longer taking    semaglutide (RYBELSUS) 3 mg tablet Reorder    glipiZIDE (GLUCOTROL) 10 MG TR24 Reorder    metFORMIN (GLUCOPHAGE-XR) 500 MG ER 24hr tablet Reorder    traZODone (DESYREL) 50 MG tablet Reorder    amLODIPine (NORVASC) 10 MG tablet Reorder    metoprolol succinate (TOPROL-XL) 100 MG 24 hr tablet Reorder    losartan (COZAAR) 100 MG tablet Reorder    atorvastatin (LIPITOR) 40 MG tablet Reorder    rOPINIRole (REQUIP) 1 MG tablet Reorder       meds sent this encounter:  Medications Ordered This Encounter   Medications    amLODIPine (NORVASC) 10 MG tablet     Sig: Take 1 tablet (10 mg total) by mouth once daily.     Dispense:  90 tablet     Refill:  3       Keep all of these on file, not requesting refills today    atorvastatin (LIPITOR) 40 MG tablet     Sig: Take 1 tablet (40 mg total) by mouth once daily.     Dispense:  90 tablet     Refill:  3     Keep all of these on file, not requesting refills today    glipiZIDE (GLUCOTROL) 10 MG TR24     Sig: Take 1 tablet (10 mg total) by mouth daily with breakfast.     Dispense:  90  tablet     Refill:  3     Keep all of these on file, not requesting refills today    losartan (COZAAR) 100 MG tablet     Sig: Take 1 tablet (100 mg total) by mouth once daily.     Dispense:  90 tablet     Refill:  3     . Keep all of these on file, not requesting refills today    metFORMIN (GLUCOPHAGE-XR) 500 MG ER 24hr tablet     Sig: Take 2 tablets (1,000 mg total) by mouth 2 (two) times daily with meals.     Dispense:  360 tablet     Refill:  3     Stop the regular release metformin Keep all of these on file, not requesting refills today    metoprolol succinate (TOPROL-XL) 100 MG 24 hr tablet     Sig: Take 1 tablet (100 mg total) by mouth once daily.     Dispense:  90 tablet     Refill:  3     . Keep all of these on file, not requesting refills today    rOPINIRole (REQUIP) 1 MG tablet     Sig: Take 1 tablet (1 mg total) by mouth 2 (two) times daily as needed (restless legs).     Dispense:  180 tablet     Refill:  3     Keep all of these on file, not requesting refills today    semaglutide (RYBELSUS) 3 mg tablet     Sig: Take 1 tablet (3 mg total) by mouth once daily. Lower dose     Dispense:  90 tablet     Refill:  3     Keep all of these on file, not requesting refills today    traZODone (DESYREL) 50 MG tablet     Sig: Take 0.5 tablets (25 mg total) by mouth nightly as needed for Insomnia.     Dispense:  30 tablet     Refill:  11     Keep all of these on file, not requesting refills today       Follow Up: No follow-ups on file. OV 4 months with labs.    Subjective:     Chief Complaint   Patient presents with    Follow-up    Diabetes     3 Month Follow-Up       HPI  Ramya is a 76 y.o. female, last appointment with this clinic was 3/8/2022.  Social History     Tobacco Use    Smoking status: Never Smoker    Smokeless tobacco: Never Used   Substance Use Topics    Alcohol use: No          Social History     Social History Narrative    Lives in [place].    2014.         No LMP  recorded. Patient has had a hysterectomy.    Last visit with me 2/2022  DM2, high-dose Rybelsus with GI side effects.  Taking glipizide and metformin.  Had stopped the Actos on my instruction  Will try to keep regimen simple  Restart Rybelsus at 3.  Stay on metformin and glipizide stay off Actos  Would like to keep A1c below 8  If > 8 restart actos  Lipid previous labs lipid profile good on Lipitor no changes.  Taking aspirin 325, may reduce to 81.  Diabetes, history of stroke  HTN, white coat syndrome.   Adjustment d/o, trazodone PRN  RLS ropinarole prn.     3/8/22 DEXA normal BMD    Pre visit labs  CMP glc high  a1c 7.8 from 10.8    Granddaughter graduated from [employer].     Trazodone takes prn. Finds it effective.    Taking meds as rx'd.  The rybelsus no GI SE at lower dose  Glucose BID checks  By recall mornings 150s.     BP high on intake  Has a home BP cuff  Thinks she has white coat  Last time checked was last week did not write it down.   She is resistant to meds that are more than once daily.      Patient Care Team:  Kameron Donato MD as PCP - General (Internal Medicine)  Melina Parmar MA as Care Coordinator  Tessa Baltazar OD as Consulting Physician (Optometry)    Patient Active Problem List    Diagnosis Date Noted    Calculus of gallbladder without cholecystitis without obstruction incidental on RUQ US 1/2021 01/22/2021 1/22/2021 RUQ US: Cholelithiasis.  No gallbladder wall thickening or pericholecystic fluid.  Negative sonographic Taylor sign.      Elevated alkaline phosphatase level, liver fraction, 1/2021 RUQ US normal except incidental gallstones 01/22/2021    Microcytic anemia mentzer index < 13, suspect this is underlying thalassemia. normal ferritin 04/09/2019    RLS (restless legs syndrome) 07/05/2018    Nonrheumatic aortic valve stenosis 6/2018 TTE trivial to mild AoS 06/06/2018    Nuclear sclerosis of both eyes 05/11/2018    Refractive errors 05/11/2018    Diabetes mellitus type  2 without retinopathy 09/20/2016    Mixed hyperlipidemia; 7/2018 prava to atorva 09/18/2012    Essential hypertension 09/18/2012    Type 2 diabetes mellitus without complication, without long-term current use of insulin; januvia expensive;1/2020 actos 09/18/2012    H/O: stroke with residual effects 09/18/2012       PAST MEDICAL PROBLEMS, PAST SURGICAL HISTORY: please see relevant portions of the electronic medical record    ALLERGIES AND MEDICATIONS: updated and reviewed.  Review of patient's allergies indicates:   Allergen Reactions    Citalopram analogues Other (See Comments)     Hallucinations       Medication List with Changes/Refills   Current Medications    AMLODIPINE (NORVASC) 10 MG TABLET    Take 1 tablet (10 mg total) by mouth once daily.    ASPIRIN (ECOTRIN) 81 MG EC TABLET    Take 1 tablet (81 mg total) by mouth once daily.    ATORVASTATIN (LIPITOR) 40 MG TABLET    Take 1 tablet (40 mg total) by mouth once daily.    BLOOD SUGAR DIAGNOSTIC (TRUE METRIX GLUCOSE TEST STRIP) STRP    Daily glucose testing; whichever brand covered by insurance.    GLIPIZIDE (GLUCOTROL) 10 MG TR24    Take 1 tablet (10 mg total) by mouth daily with breakfast.    LANCETS (ONETOUCH DELICA LANCETS) 33 GAUGE MISC    1 lancet by Misc.(Non-Drug; Combo Route) route once daily. ONCE DAILY BLOOD SUGAR TESTING; WHICHEVER BRAND COVERED BY INSURANCE    LOSARTAN (COZAAR) 100 MG TABLET    Take 1 tablet (100 mg total) by mouth once daily.    METFORMIN (GLUCOPHAGE-XR) 500 MG ER 24HR TABLET    Take 2 tablets (1,000 mg total) by mouth 2 (two) times daily with meals.    METOPROLOL SUCCINATE (TOPROL-XL) 100 MG 24 HR TABLET    Take 1 tablet (100 mg total) by mouth once daily.    MULTIVIT-MIN-FERROUS GLUCONATE 9 MG IRON/15 ML ORAL LIQUID    Take by mouth. 1 Liquid Oral Every day    ROPINIROLE (REQUIP) 1 MG TABLET    Take 1 tablet (1 mg total) by mouth 2 (two) times daily as needed (restless legs).    SEMAGLUTIDE (RYBELSUS) 3 MG TABLET    Take 1  "tablet (3 mg total) by mouth once daily. Lower dose    TRAZODONE (DESYREL) 50 MG TABLET    Take 0.5 tablets (25 mg total) by mouth nightly as needed for Insomnia.    TRIAMCINOLONE ACETONIDE 0.1% (KENALOG) 0.1 % CREAM    Apply topically 2 (two) times daily.      Review of Systems   Constitutional: Negative for chills and fever.   Respiratory: Negative for shortness of breath.    Cardiovascular: Negative for chest pain.       Objective:   Physical Exam   Vitals:    05/26/22 1404   BP: (!) 146/62   BP Location: Left arm   Patient Position: Sitting   BP Method: Medium (Manual)   Pulse: 83   Temp: 98.4 °F (36.9 °C)   TempSrc: Oral   SpO2: 98%   Height: 5' 3" (1.6 m)    Body mass index is 26.93 kg/m².      Height: 5' 3" (160 cm)     Physical Exam  Constitutional:       General: She is not in acute distress.     Appearance: She is well-developed.   Cardiovascular:      Rate and Rhythm: Normal rate and regular rhythm.      Heart sounds: Normal heart sounds. No murmur heard.  Pulmonary:      Effort: Pulmonary effort is normal.      Breath sounds: Normal breath sounds.   Musculoskeletal:         General: Normal range of motion.      Right lower leg: No edema.      Left lower leg: No edema.   Skin:     General: Skin is warm and dry.   Neurological:      Mental Status: She is alert and oriented to person, place, and time.      Coordination: Coordination normal.   Psychiatric:         Behavior: Behavior normal.         Thought Content: Thought content normal.         Component      Latest Ref Rng & Units 5/17/2022 2/11/2022   WBC      3.90 - 12.70 K/uL  9.71   RBC      4.00 - 5.40 M/uL  5.82 (H)   Hemoglobin      12.0 - 16.0 g/dL  12.1   Hematocrit      37.0 - 48.5 %  41.6   MCV      82 - 98 fL  72 (L)   MCH      27.0 - 31.0 pg  20.8 (L)   MCHC      32.0 - 36.0 g/dL  29.1 (L)   RDW      11.5 - 14.5 %  14.3   Platelets      150 - 450 K/uL  298   MPV      9.2 - 12.9 fL  10.7   Immature Granulocytes      0.0 - 0.5 %  0.2   Gran # " (ANC)      1.8 - 7.7 K/uL  5.1   Immature Grans (Abs)      0.00 - 0.04 K/uL  0.02   Lymph #      1.0 - 4.8 K/uL  3.9   Mono #      0.3 - 1.0 K/uL  0.6   Eos #      0.0 - 0.5 K/uL  0.1   Baso #      0.00 - 0.20 K/uL  0.07   nRBC      0 /100 WBC  0   Gran %      38.0 - 73.0 %  52.4   Lymph %      18.0 - 48.0 %  40.0   Mono %      4.0 - 15.0 %  5.8   Eosinophil %      0.0 - 8.0 %  0.9   Basophil %      0.0 - 1.9 %  0.7   Differential Method        Automated   Sodium      136 - 145 mmol/L 137 141   Potassium      3.5 - 5.1 mmol/L 4.1 4.2   Chloride      95 - 110 mmol/L 103 101   CO2      23 - 29 mmol/L 24 26   Glucose      70 - 110 mg/dL 209 (H) 287 (H)   BUN      8 - 23 mg/dL 13 12   Creatinine      0.5 - 1.4 mg/dL 0.8 0.9   Calcium      8.7 - 10.5 mg/dL 9.4 9.7   PROTEIN TOTAL      6.0 - 8.4 g/dL 8.1 8.0   Albumin      3.5 - 5.2 g/dL 4.1 4.0   BILIRUBIN TOTAL      0.1 - 1.0 mg/dL 0.9 1.0   Alkaline Phosphatase      55 - 135 U/L 117 129   AST      10 - 40 U/L 34 41 (H)   ALT      10 - 44 U/L 32 32   Anion Gap      8 - 16 mmol/L 10 14   eGFR if African American      >60 mL/min/1.73 m:2 >60.0 >60.0   eGFR if non African American      >60 mL/min/1.73 m:2 >60.0 >60.0   Cholesterol      120 - 199 mg/dL  142   Triglycerides      30 - 150 mg/dL  158 (H)   HDL      40 - 75 mg/dL  46   LDL Cholesterol External      63.0 - 159.0 mg/dL  64.4   HDL/Cholesterol Ratio      20.0 - 50.0 %  32.4   Total Cholesterol/HDL Ratio      2.0 - 5.0  3.1   Non-HDL Cholesterol      mg/dL  96   Microalbumin, Urine      ug/mL  6.0   Creatinine, Urine      15.0 - 325.0 mg/dL  50.0   MICROALB/CREAT RATIO      0.0 - 30.0 ug/mg  12.0   Hemoglobin A1C External      4.0 - 5.6 % 7.8 (H) 10.8 (H)   Estimated Avg Glucose      68 - 131 mg/dL 177 (H) 263 (H)

## 2022-05-27 NOTE — TELEPHONE ENCOUNTER
----- Message from Haresh Sparks sent at 5/27/2022  9:05 AM CDT -----  Regarding: bp reading for this morning  Type: Patient Call Back    Who called:Ramya     What is the request in detail: the patient called to notify Dr. Donato that her bp was  138/71 this morning. Patient stated that she feels fine.     Can the clinic reply by [facility]?no     Would the patient rather a call back or a response via [facility]? Call back     Best call back number:[phone] (M)

## 2022-05-30 VITALS — DIASTOLIC BLOOD PRESSURE: 71 MMHG | SYSTOLIC BLOOD PRESSURE: 138 MMHG

## 2022-09-21 ENCOUNTER — LAB VISIT (OUTPATIENT)
Dept: LAB | Facility: HOSPITAL | Age: 77
End: 2022-09-21
Attending: INTERNAL MEDICINE
Payer: MEDICARE

## 2022-09-21 DIAGNOSIS — E11.9 TYPE 2 DIABETES MELLITUS WITHOUT COMPLICATION, WITHOUT LONG-TERM CURRENT USE OF INSULIN: ICD-10-CM

## 2022-09-21 LAB
ALBUMIN SERPL BCP-MCNC: 4.1 G/DL (ref 3.5–5.2)
ALP SERPL-CCNC: 116 U/L (ref 55–135)
ALT SERPL W/O P-5'-P-CCNC: 27 U/L (ref 10–44)
ANION GAP SERPL CALC-SCNC: 10 MMOL/L (ref 8–16)
AST SERPL-CCNC: 30 U/L (ref 10–40)
BASOPHILS # BLD AUTO: 0.06 K/UL (ref 0–0.2)
BASOPHILS NFR BLD: 0.8 % (ref 0–1.9)
BILIRUB SERPL-MCNC: 1 MG/DL (ref 0.1–1)
BUN SERPL-MCNC: 12 MG/DL (ref 8–23)
CALCIUM SERPL-MCNC: 9.7 MG/DL (ref 8.7–10.5)
CHLORIDE SERPL-SCNC: 101 MMOL/L (ref 95–110)
CO2 SERPL-SCNC: 24 MMOL/L (ref 23–29)
CREAT SERPL-MCNC: 0.9 MG/DL (ref 0.5–1.4)
DIFFERENTIAL METHOD: ABNORMAL
EOSINOPHIL # BLD AUTO: 0.2 K/UL (ref 0–0.5)
EOSINOPHIL NFR BLD: 2.4 % (ref 0–8)
ERYTHROCYTE [DISTWIDTH] IN BLOOD BY AUTOMATED COUNT: 15.3 % (ref 11.5–14.5)
EST. GFR  (NO RACE VARIABLE): >60 ML/MIN/1.73 M^2
ESTIMATED AVG GLUCOSE: 226 MG/DL (ref 68–131)
GLUCOSE SERPL-MCNC: 314 MG/DL (ref 70–110)
HBA1C MFR BLD: 9.5 % (ref 4–5.6)
HCT VFR BLD AUTO: 41 % (ref 37–48.5)
HGB BLD-MCNC: 11.9 G/DL (ref 12–16)
IMM GRANULOCYTES # BLD AUTO: 0.01 K/UL (ref 0–0.04)
IMM GRANULOCYTES NFR BLD AUTO: 0.1 % (ref 0–0.5)
LYMPHOCYTES # BLD AUTO: 3.2 K/UL (ref 1–4.8)
LYMPHOCYTES NFR BLD: 45.5 % (ref 18–48)
MCH RBC QN AUTO: 20.8 PG (ref 27–31)
MCHC RBC AUTO-ENTMCNC: 29 G/DL (ref 32–36)
MCV RBC AUTO: 72 FL (ref 82–98)
MONOCYTES # BLD AUTO: 0.5 K/UL (ref 0.3–1)
MONOCYTES NFR BLD: 7.6 % (ref 4–15)
NEUTROPHILS # BLD AUTO: 3.1 K/UL (ref 1.8–7.7)
NEUTROPHILS NFR BLD: 43.6 % (ref 38–73)
NRBC BLD-RTO: 0 /100 WBC
PLATELET # BLD AUTO: 274 K/UL (ref 150–450)
PMV BLD AUTO: 10.7 FL (ref 9.2–12.9)
POTASSIUM SERPL-SCNC: 4.4 MMOL/L (ref 3.5–5.1)
PROT SERPL-MCNC: 7.9 G/DL (ref 6–8.4)
RBC # BLD AUTO: 5.72 M/UL (ref 4–5.4)
SODIUM SERPL-SCNC: 135 MMOL/L (ref 136–145)
WBC # BLD AUTO: 7.06 K/UL (ref 3.9–12.7)

## 2022-09-21 PROCEDURE — 83036 HEMOGLOBIN GLYCOSYLATED A1C: CPT | Performed by: INTERNAL MEDICINE

## 2022-09-21 PROCEDURE — 36415 COLL VENOUS BLD VENIPUNCTURE: CPT | Mod: PO | Performed by: INTERNAL MEDICINE

## 2022-09-21 PROCEDURE — 80053 COMPREHEN METABOLIC PANEL: CPT | Performed by: INTERNAL MEDICINE

## 2022-09-21 PROCEDURE — 85025 COMPLETE CBC W/AUTO DIFF WBC: CPT | Performed by: INTERNAL MEDICINE

## 2022-09-23 NOTE — PROGRESS NOTES
This note was created by combination of typed  and M-Modal dictation.  Transcription errors may be present.  If there are any questions, please contact me.    Assessment and Plan:   Diabetes mellitus type 2 without retinopathy  Type 2 diabetes mellitus without complication, without long-term current use of insulin; roseanneuvia expensive;1/2020 actos  -pre visit labs A1c high, has been out of Rybelsus soon after her last visit me because of the donut hole.    Referral to pharmacy assistance.  Discussed that when she reduce her insurance, it is possible she may qualify for additional medication coverage if she is unable to afford medicines in the donut hole.  Until she is able to restart the Rybelsus, will send in Actos, she took this previously  Stay on glipizide and stay on metformin  -     pioglitazone (ACTOS) 15 MG tablet; Take 1 tablet (15 mg total) by mouth once daily.  Dispense: 90 tablet; Refill: 1  -     Ambulatory referral/consult to Pharmacy Assistance; Future; Expected date: 10/03/2022  -     Comprehensive Metabolic Panel; Future; Expected date: 12/26/2022  -     Hemoglobin A1C; Future; Expected date: 12/26/2022  -     CBC Auto Differential; Future; Expected date: 12/26/2022    Essential hypertension  -blood pressure stable.    Mixed hyperlipidemia; 7/2018 prava to atorva  -last lipid in February good.    Microcytic anemia mentzer index < 13, suspect this is underlying thalassemia. normal ferritin  -notes sometimes fatigue. Wonders if iron is low.  2018 was normal. Will re-check.  Can empirically try B12 though discussed with her that it may not help.   -     Ferritin; Future; Expected date: 12/26/2022    She declines flu shot    There are no discontinued medications.    meds sent this encounter:  Medications Ordered This Encounter   Medications    pioglitazone (ACTOS) 15 MG tablet     Sig: Take 1 tablet (15 mg total) by mouth once daily.     Dispense:  90 tablet     Refill:  1       Follow Up: No  follow-ups on file. OV 3 months with labs.    Subjective:     Chief Complaint   Patient presents with    Follow-up     4 months        HPI  Ramya is a 76 y.o. female, last appointment with this clinic was 2022.  Social History     Tobacco Use    Smoking status: Never    Smokeless tobacco: Never   Substance Use Topics    Alcohol use: No          Social History     Social History Narrative    Lives in [place].    2014.         No LMP recorded. Patient has had a hysterectomy.    Last visit with me late May  DM a1c better not at 7.0; goal to be < 8.0. on max doses of meds. No changes.  If > 8.0, actos.  Lipid/labs 2022 good; statin  HTN, white coat. Not to goal. No changes.   RLS ropinarole  Adjustment d/o, trazodone prn  3/8/22 DEXA normal BMD    Pre visit labs  CBC WNL  CMP glc high  A1c 9.5 from 7.8    Filling   Amlodipine   Lipitor   Glipizide   Losartan   Metformin   Toprol xl   Singulair   Rybelsus     In the donut hole.  $400.  She took Rybelsus for another 30 days after her last visit. And then when she went for refill, donut hole. As far as she knows she is still in the donut hole.    Asymptomatic with high blood sugars.     Denies CP, dypsnea, cough.      Notes sometimes fatigue.  Inquiring about her iron levels.  ferritin 90.  No gross blood in stool.     Patient Care Team:  Kameron Donato MD as PCP - General (Internal Medicine)  Melina Parmar MA as Care Coordinator  Tessa Baltazar OD as Consulting Physician (Optometry)    Patient Active Problem List    Diagnosis Date Noted    Calculus of gallbladder without cholecystitis without obstruction incidental on RUQ US 2021 RUQ US: Cholelithiasis.  No gallbladder wall thickening or pericholecystic fluid.  Negative sonographic Taylor sign.      Elevated alkaline phosphatase level, liver fraction, 2021 RUQ US normal except incidental gallstones 2021    Microcytic  anemia mentzer index < 13, suspect this is underlying thalassemia. normal ferritin 04/09/2019    RLS (restless legs syndrome) 07/05/2018    Nonrheumatic aortic valve stenosis 6/2018 TTE trivial to mild AoS 06/06/2018    Nuclear sclerosis of both eyes 05/11/2018    Refractive errors 05/11/2018    Diabetes mellitus type 2 without retinopathy 09/20/2016    Mixed hyperlipidemia; 7/2018 prava to atorva 09/18/2012    Essential hypertension 09/18/2012    Type 2 diabetes mellitus without complication, without long-term current use of insulin; januvia expensive;1/2020 actos 09/18/2012    H/O: stroke with residual effects 09/18/2012       PAST MEDICAL PROBLEMS, PAST SURGICAL HISTORY: please see relevant portions of the electronic medical record    ALLERGIES AND MEDICATIONS: updated and reviewed.  Review of patient's allergies indicates:   Allergen Reactions    Citalopram analogues Other (See Comments)     Hallucinations       Medication List with Changes/Refills   Current Medications    AMLODIPINE (NORVASC) 10 MG TABLET    Take 1 tablet (10 mg total) by mouth once daily.    ASPIRIN (ECOTRIN) 81 MG EC TABLET    Take 1 tablet (81 mg total) by mouth once daily.    ATORVASTATIN (LIPITOR) 40 MG TABLET    Take 1 tablet (40 mg total) by mouth once daily.    BLOOD SUGAR DIAGNOSTIC (TRUE METRIX GLUCOSE TEST STRIP) STRP    Daily glucose testing; whichever brand covered by insurance.    GLIPIZIDE (GLUCOTROL) 10 MG TR24    Take 1 tablet (10 mg total) by mouth daily with breakfast.    LANCETS (ONETOUCH DELICA LANCETS) 33 GAUGE MISC    1 lancet by Misc.(Non-Drug; Combo Route) route once daily. ONCE DAILY BLOOD SUGAR TESTING; WHICHEVER BRAND COVERED BY INSURANCE    LOSARTAN (COZAAR) 100 MG TABLET    Take 1 tablet (100 mg total) by mouth once daily.    METFORMIN (GLUCOPHAGE-XR) 500 MG ER 24HR TABLET    Take 2 tablets (1,000 mg total) by mouth 2 (two) times daily with meals.    METOPROLOL SUCCINATE (TOPROL-XL) 100 MG 24 HR TABLET    Take 1  "tablet (100 mg total) by mouth once daily.    MULTIVIT-MIN-FERROUS GLUCONATE 9 MG IRON/15 ML ORAL LIQUID    Take by mouth. 1 Liquid Oral Every day    ROPINIROLE (REQUIP) 1 MG TABLET    Take 1 tablet (1 mg total) by mouth 2 (two) times daily as needed (restless legs).    SEMAGLUTIDE (RYBELSUS) 3 MG TABLET    Take 1 tablet (3 mg total) by mouth once daily. Lower dose    TRAZODONE (DESYREL) 50 MG TABLET    Take 0.5 tablets (25 mg total) by mouth nightly as needed for Insomnia.        Objective:   Physical Exam   Vitals:    09/26/22 1423   BP: 132/68   BP Location: Left arm   Patient Position: Sitting   BP Method: Medium (Manual)   Pulse: 78   Temp: 98.7 °F (37.1 °C)   TempSrc: Oral   SpO2: 98%   Weight: 66.3 kg (146 lb 4.4 oz)   Height: 5' 3" (1.6 m)    Body mass index is 25.91 kg/m².            Physical Exam  Constitutional:       General: She is not in acute distress.     Appearance: She is well-developed.   Eyes:      Extraocular Movements: Extraocular movements intact.   Cardiovascular:      Rate and Rhythm: Normal rate and regular rhythm.      Heart sounds: Normal heart sounds. No murmur heard.  Pulmonary:      Effort: Pulmonary effort is normal.      Breath sounds: Normal breath sounds.   Musculoskeletal:         General: Normal range of motion.      Right lower leg: No edema.      Left lower leg: No edema.   Skin:     General: Skin is warm and dry.   Neurological:      Mental Status: She is alert and oriented to person, place, and time.      Coordination: Coordination normal.   Psychiatric:         Behavior: Behavior normal.         Thought Content: Thought content normal.       Component      Latest Ref Rng & Units 9/21/2022 5/17/2022 2/11/2022   WBC      3.90 - 12.70 K/uL 7.06  9.71   RBC      4.00 - 5.40 M/uL 5.72 (H)  5.82 (H)   Hemoglobin      12.0 - 16.0 g/dL 11.9 (L)  12.1   Hematocrit      37.0 - 48.5 % 41.0  41.6   MCV      82 - 98 fL 72 (L)  72 (L)   MCH      27.0 - 31.0 pg 20.8 (L)  20.8 (L)   MCHC    "   32.0 - 36.0 g/dL 29.0 (L)  29.1 (L)   RDW      11.5 - 14.5 % 15.3 (H)  14.3   Platelets      150 - 450 K/uL 274  298   MPV      9.2 - 12.9 fL 10.7  10.7   Immature Granulocytes      0.0 - 0.5 % 0.1  0.2   Gran # (ANC)      1.8 - 7.7 K/uL 3.1  5.1   Immature Grans (Abs)      0.00 - 0.04 K/uL 0.01  0.02   Lymph #      1.0 - 4.8 K/uL 3.2  3.9   Mono #      0.3 - 1.0 K/uL 0.5  0.6   Eos #      0.0 - 0.5 K/uL 0.2  0.1   Baso #      0.00 - 0.20 K/uL 0.06  0.07   nRBC      0 /100 WBC 0  0   Gran %      38.0 - 73.0 % 43.6  52.4   Lymph %      18.0 - 48.0 % 45.5  40.0   Mono %      4.0 - 15.0 % 7.6  5.8   Eosinophil %      0.0 - 8.0 % 2.4  0.9   Basophil %      0.0 - 1.9 % 0.8  0.7   Differential Method       Automated  Automated   Sodium      136 - 145 mmol/L 135 (L) 137 141   Potassium      3.5 - 5.1 mmol/L 4.4 4.1 4.2   Chloride      95 - 110 mmol/L 101 103 101   CO2      23 - 29 mmol/L 24 24 26   Glucose      70 - 110 mg/dL 314 (H) 209 (H) 287 (H)   BUN      8 - 23 mg/dL 12 13 12   Creatinine      0.5 - 1.4 mg/dL 0.9 0.8 0.9   Calcium      8.7 - 10.5 mg/dL 9.7 9.4 9.7   PROTEIN TOTAL      6.0 - 8.4 g/dL 7.9 8.1 8.0   Albumin      3.5 - 5.2 g/dL 4.1 4.1 4.0   BILIRUBIN TOTAL      0.1 - 1.0 mg/dL 1.0 0.9 1.0   Alkaline Phosphatase      55 - 135 U/L 116 117 129   AST      10 - 40 U/L 30 34 41 (H)   ALT      10 - 44 U/L 27 32 32   Anion Gap      8 - 16 mmol/L 10 10 14   eGFR if African American      >60 mL/min/1.73 m:2  >60.0 >60.0   eGFR if non African American      >60 mL/min/1.73 m:2  >60.0 >60.0   eGFR      >60 mL/min/1.73 m:2 >60.0     Cholesterol      120 - 199 mg/dL   142   Triglycerides      30 - 150 mg/dL   158 (H)   HDL      40 - 75 mg/dL   46   LDL Cholesterol External      63.0 - 159.0 mg/dL   64.4   HDL/Cholesterol Ratio      20.0 - 50.0 %   32.4   Total Cholesterol/HDL Ratio      2.0 - 5.0   3.1   Non-HDL Cholesterol      mg/dL   96   Microalbumin, Urine      ug/mL   6.0   Creatinine, Urine      15.0 - 325.0  mg/dL   50.0   MICROALB/CREAT RATIO      0.0 - 30.0 ug/mg   12.0   Hemoglobin A1C External      4.0 - 5.6 % 9.5 (H) 7.8 (H) 10.8 (H)   Estimated Avg Glucose      68 - 131 mg/dL 226 (H) 177 (H) 263 (H)

## 2022-09-26 ENCOUNTER — OFFICE VISIT (OUTPATIENT)
Dept: FAMILY MEDICINE | Facility: CLINIC | Age: 77
End: 2022-09-26
Payer: MEDICARE

## 2022-09-26 VITALS
OXYGEN SATURATION: 98 % | WEIGHT: 146.25 LBS | BODY MASS INDEX: 25.91 KG/M2 | SYSTOLIC BLOOD PRESSURE: 132 MMHG | HEART RATE: 78 BPM | DIASTOLIC BLOOD PRESSURE: 68 MMHG | HEIGHT: 63 IN | TEMPERATURE: 99 F

## 2022-09-26 DIAGNOSIS — E11.9 TYPE 2 DIABETES MELLITUS WITHOUT COMPLICATION, WITHOUT LONG-TERM CURRENT USE OF INSULIN: ICD-10-CM

## 2022-09-26 DIAGNOSIS — I10 ESSENTIAL HYPERTENSION: ICD-10-CM

## 2022-09-26 DIAGNOSIS — D50.9 MICROCYTIC ANEMIA: ICD-10-CM

## 2022-09-26 DIAGNOSIS — E11.9 DIABETES MELLITUS TYPE 2 WITHOUT RETINOPATHY: Primary | ICD-10-CM

## 2022-09-26 DIAGNOSIS — E78.2 MIXED HYPERLIPIDEMIA: ICD-10-CM

## 2022-09-26 PROCEDURE — 3288F PR FALLS RISK ASSESSMENT DOCUMENTED: ICD-10-PCS | Mod: CPTII,S$GLB,, | Performed by: INTERNAL MEDICINE

## 2022-09-26 PROCEDURE — 3075F SYST BP GE 130 - 139MM HG: CPT | Mod: CPTII,S$GLB,, | Performed by: INTERNAL MEDICINE

## 2022-09-26 PROCEDURE — 1159F MED LIST DOCD IN RCRD: CPT | Mod: CPTII,S$GLB,, | Performed by: INTERNAL MEDICINE

## 2022-09-26 PROCEDURE — 3288F FALL RISK ASSESSMENT DOCD: CPT | Mod: CPTII,S$GLB,, | Performed by: INTERNAL MEDICINE

## 2022-09-26 PROCEDURE — 1160F RVW MEDS BY RX/DR IN RCRD: CPT | Mod: CPTII,S$GLB,, | Performed by: INTERNAL MEDICINE

## 2022-09-26 PROCEDURE — 1160F PR REVIEW ALL MEDS BY PRESCRIBER/CLIN PHARMACIST DOCUMENTED: ICD-10-PCS | Mod: CPTII,S$GLB,, | Performed by: INTERNAL MEDICINE

## 2022-09-26 PROCEDURE — 99214 PR OFFICE/OUTPT VISIT, EST, LEVL IV, 30-39 MIN: ICD-10-PCS | Mod: S$GLB,,, | Performed by: INTERNAL MEDICINE

## 2022-09-26 PROCEDURE — 99214 OFFICE O/P EST MOD 30 MIN: CPT | Mod: S$GLB,,, | Performed by: INTERNAL MEDICINE

## 2022-09-26 PROCEDURE — 3075F PR MOST RECENT SYSTOLIC BLOOD PRESS GE 130-139MM HG: ICD-10-PCS | Mod: CPTII,S$GLB,, | Performed by: INTERNAL MEDICINE

## 2022-09-26 PROCEDURE — 99999 PR PBB SHADOW E&M-EST. PATIENT-LVL V: CPT | Mod: PBBFAC,,, | Performed by: INTERNAL MEDICINE

## 2022-09-26 PROCEDURE — 1101F PR PT FALLS ASSESS DOC 0-1 FALLS W/OUT INJ PAST YR: ICD-10-PCS | Mod: CPTII,S$GLB,, | Performed by: INTERNAL MEDICINE

## 2022-09-26 PROCEDURE — 3078F PR MOST RECENT DIASTOLIC BLOOD PRESSURE < 80 MM HG: ICD-10-PCS | Mod: CPTII,S$GLB,, | Performed by: INTERNAL MEDICINE

## 2022-09-26 PROCEDURE — 1159F PR MEDICATION LIST DOCUMENTED IN MEDICAL RECORD: ICD-10-PCS | Mod: CPTII,S$GLB,, | Performed by: INTERNAL MEDICINE

## 2022-09-26 PROCEDURE — 1101F PT FALLS ASSESS-DOCD LE1/YR: CPT | Mod: CPTII,S$GLB,, | Performed by: INTERNAL MEDICINE

## 2022-09-26 PROCEDURE — 1126F AMNT PAIN NOTED NONE PRSNT: CPT | Mod: CPTII,S$GLB,, | Performed by: INTERNAL MEDICINE

## 2022-09-26 PROCEDURE — 99999 PR PBB SHADOW E&M-EST. PATIENT-LVL V: ICD-10-PCS | Mod: PBBFAC,,, | Performed by: INTERNAL MEDICINE

## 2022-09-26 PROCEDURE — 3078F DIAST BP <80 MM HG: CPT | Mod: CPTII,S$GLB,, | Performed by: INTERNAL MEDICINE

## 2022-09-26 PROCEDURE — 1126F PR PAIN SEVERITY QUANTIFIED, NO PAIN PRESENT: ICD-10-PCS | Mod: CPTII,S$GLB,, | Performed by: INTERNAL MEDICINE

## 2022-09-26 RX ORDER — MONTELUKAST SODIUM 10 MG/1
10 TABLET ORAL NIGHTLY
COMMUNITY
Start: 2022-08-02

## 2022-09-26 RX ORDER — PIOGLITAZONEHYDROCHLORIDE 15 MG/1
15 TABLET ORAL DAILY
Qty: 90 TABLET | Refills: 1 | Status: SHIPPED | OUTPATIENT
Start: 2022-09-26 | End: 2023-06-19

## 2022-10-05 DIAGNOSIS — I10 ESSENTIAL HYPERTENSION: ICD-10-CM

## 2022-10-05 RX ORDER — AMLODIPINE BESYLATE 10 MG/1
TABLET ORAL
Qty: 90 TABLET | Refills: 3 | Status: SHIPPED | OUTPATIENT
Start: 2022-10-05 | End: 2023-09-28 | Stop reason: SDUPTHER

## 2022-10-05 NOTE — TELEPHONE ENCOUNTER
Refill Decision Note   Ramya Mayo  is requesting a refill authorization.  Brief Assessment and Rationale for Refill:  Approve     Medication Therapy Plan:       Medication Reconciliation Completed: No   Comments:     No Care Gaps recommended.     Note composed:2:10 PM 10/05/2022

## 2022-10-10 ENCOUNTER — TELEPHONE (OUTPATIENT)
Dept: PHARMACY | Facility: CLINIC | Age: 77
End: 2022-10-10
Payer: MEDICARE

## 2022-10-10 NOTE — TELEPHONE ENCOUNTER
I have reached out to Ramya Mayo to inform her of the  application process and whats required to apply.  Ramya Mayo did not answer. I left a voicemail and mailed a letter introducing her to the pharmacy patient assistance program. I will follow up in 5 business days.

## 2022-10-15 ENCOUNTER — PES CALL (OUTPATIENT)
Dept: ADMINISTRATIVE | Facility: CLINIC | Age: 77
End: 2022-10-15
Payer: MEDICARE

## 2022-12-12 ENCOUNTER — LAB VISIT (OUTPATIENT)
Dept: LAB | Facility: HOSPITAL | Age: 77
End: 2022-12-12
Attending: INTERNAL MEDICINE
Payer: MEDICARE

## 2022-12-12 DIAGNOSIS — D50.9 MICROCYTIC ANEMIA: ICD-10-CM

## 2022-12-12 DIAGNOSIS — E11.9 TYPE 2 DIABETES MELLITUS WITHOUT COMPLICATION, WITHOUT LONG-TERM CURRENT USE OF INSULIN: ICD-10-CM

## 2022-12-12 DIAGNOSIS — E11.9 DIABETES MELLITUS TYPE 2 WITHOUT RETINOPATHY: ICD-10-CM

## 2022-12-12 LAB
ALBUMIN SERPL BCP-MCNC: 4.1 G/DL (ref 3.5–5.2)
ALP SERPL-CCNC: 102 U/L (ref 55–135)
ALT SERPL W/O P-5'-P-CCNC: 22 U/L (ref 10–44)
ANION GAP SERPL CALC-SCNC: 13 MMOL/L (ref 8–16)
AST SERPL-CCNC: 28 U/L (ref 10–40)
BASOPHILS # BLD AUTO: 0.09 K/UL (ref 0–0.2)
BASOPHILS NFR BLD: 1 % (ref 0–1.9)
BILIRUB SERPL-MCNC: 1.2 MG/DL (ref 0.1–1)
BUN SERPL-MCNC: 15 MG/DL (ref 8–23)
CALCIUM SERPL-MCNC: 10.1 MG/DL (ref 8.7–10.5)
CHLORIDE SERPL-SCNC: 102 MMOL/L (ref 95–110)
CO2 SERPL-SCNC: 24 MMOL/L (ref 23–29)
CREAT SERPL-MCNC: 0.8 MG/DL (ref 0.5–1.4)
DIFFERENTIAL METHOD: ABNORMAL
EOSINOPHIL # BLD AUTO: 0.2 K/UL (ref 0–0.5)
EOSINOPHIL NFR BLD: 1.9 % (ref 0–8)
ERYTHROCYTE [DISTWIDTH] IN BLOOD BY AUTOMATED COUNT: 14.8 % (ref 11.5–14.5)
EST. GFR  (NO RACE VARIABLE): >60 ML/MIN/1.73 M^2
ESTIMATED AVG GLUCOSE: 160 MG/DL (ref 68–131)
FERRITIN SERPL-MCNC: 37 NG/ML (ref 20–300)
GLUCOSE SERPL-MCNC: 184 MG/DL (ref 70–110)
HBA1C MFR BLD: 7.2 % (ref 4–5.6)
HCT VFR BLD AUTO: 39.7 % (ref 37–48.5)
HGB BLD-MCNC: 11.7 G/DL (ref 12–16)
IMM GRANULOCYTES # BLD AUTO: 0.01 K/UL (ref 0–0.04)
IMM GRANULOCYTES NFR BLD AUTO: 0.1 % (ref 0–0.5)
LYMPHOCYTES # BLD AUTO: 4 K/UL (ref 1–4.8)
LYMPHOCYTES NFR BLD: 44.2 % (ref 18–48)
MCH RBC QN AUTO: 21.1 PG (ref 27–31)
MCHC RBC AUTO-ENTMCNC: 29.5 G/DL (ref 32–36)
MCV RBC AUTO: 72 FL (ref 82–98)
MONOCYTES # BLD AUTO: 0.6 K/UL (ref 0.3–1)
MONOCYTES NFR BLD: 7.1 % (ref 4–15)
NEUTROPHILS # BLD AUTO: 4.1 K/UL (ref 1.8–7.7)
NEUTROPHILS NFR BLD: 45.7 % (ref 38–73)
NRBC BLD-RTO: 0 /100 WBC
PLATELET # BLD AUTO: 303 K/UL (ref 150–450)
PMV BLD AUTO: 10.6 FL (ref 9.2–12.9)
POTASSIUM SERPL-SCNC: 4.5 MMOL/L (ref 3.5–5.1)
PROT SERPL-MCNC: 8 G/DL (ref 6–8.4)
RBC # BLD AUTO: 5.54 M/UL (ref 4–5.4)
SODIUM SERPL-SCNC: 139 MMOL/L (ref 136–145)
WBC # BLD AUTO: 9.01 K/UL (ref 3.9–12.7)

## 2022-12-12 PROCEDURE — 36415 COLL VENOUS BLD VENIPUNCTURE: CPT | Mod: PO | Performed by: INTERNAL MEDICINE

## 2022-12-12 PROCEDURE — 85025 COMPLETE CBC W/AUTO DIFF WBC: CPT | Performed by: INTERNAL MEDICINE

## 2022-12-12 PROCEDURE — 82728 ASSAY OF FERRITIN: CPT | Performed by: INTERNAL MEDICINE

## 2022-12-12 PROCEDURE — 83036 HEMOGLOBIN GLYCOSYLATED A1C: CPT | Performed by: INTERNAL MEDICINE

## 2022-12-12 PROCEDURE — 80053 COMPREHEN METABOLIC PANEL: CPT | Performed by: INTERNAL MEDICINE

## 2022-12-18 NOTE — PROGRESS NOTES
This note was created by combination of typed  and M-Modal dictation.  Transcription errors may be present.  If there are any questions, please contact me.    Assessment and Plan:   Type 2 diabetes mellitus without complication, without long-term current use of insulin; roseanneuvia expensive;1/2020 actos  -Rybelsus cost went up when she was in the donut hole.  She never completed the application for patient assistance program it sounds like.  She is skeptical that she is going to qualify.    She would rather not have to deal with this every year with going through the donut hole.  The pioglitazone seems to have been helpful in lowering her blood sugars and she denies side effects of it.    Stay on pioglitazone, metformin, glipizide  -     Comprehensive Metabolic Panel; Future; Expected date: 06/15/2023  -     Lipid Panel; Future; Expected date: 06/15/2023  -     Hemoglobin A1C; Future; Expected date: 06/15/2023  -     Microalbumin/Creatinine Ratio, Urine; Future; Expected date: 06/15/2023  -     CBC Auto Differential; Future; Expected date: 06/15/2023    Essential hypertension  -blood pressure today stable.  On Toprol XL, losartan, amlodipine    Mixed hyperlipidemia; 7/2018 prava to atorva  -check future labs on atorvastatin    Nonrheumatic aortic valve stenosis  -murmur present for the past several readings, 2018 showed mild aortic stenosis, will refer her to cardiology for monitoring/surveillance.  Denies dizziness, chest pain, presyncope, palpitations  -     Ambulatory referral/consult to Cardiology; Future; Expected date: 12/26/2022    Microcytic anemia mentzer index < 13, suspect this is underlying thalassemia. normal ferritin  -stable    Medications Discontinued During This Encounter   Medication Reason    semaglutide (RYBELSUS) 3 mg tablet Cost of medication       meds sent this encounter:       Follow Up: No follow-ups on file.  Follow-up 6 months with pre visit labs    Subjective:     Chief Complaint  "  Patient presents with    Follow-up     3 months        HPI  Ramya is a 76 y.o. female.    Social History     Tobacco Use    Smoking status: Never    Smokeless tobacco: Never   Substance Use Topics    Alcohol use: No          Social History     Social History Narrative    Lives in [place].    2014.         No LMP recorded. Patient has had a hysterectomy.    Last appointment with this clinic was 2022. Last visit with me 2022   DM2 without complication pre visit labs a1c high had been out of rybelsus due to cost/donut hole. Pharnacy assistance. Until sh ecan get it, actos. Glipizide and metformin.  HTN, lipid  Microcytic anemia check iron  RLS ropinarole  Adjustment d/o, trazodone prn  3/8/22 DEXA normal BMD    Pre visit labs  CBC mild anemia, microcytic  Ferritin 37  CMP hyperglycemia  A1c 7.2 from 9.5    Still not taking rybelsus.  She never completed the patient assistance program application.  She thinks that she does not meet income criteria.  But can not afford the co-pay in the donut hole currently.      Tolerating the pioglitazone without issues.  Denies weight gain, denies pedal edema.  Notes pedal edema only with standing a lot  No hypoglycemia    Taking statin and ASA    Taking toprol, losartan, amlodipine  BP today is good  Periodic home readings "good" but cannot recall specific readings.      Periodic home glucose readings.  Daily to BID.  By recall lastnight 115 at bedtime  This morning by recall 135    Murmur on exam today.  Have heard this in the past.  Echo in 2018 showed mild aortic stenosis.  Denies dizziness, palpitations, lightheadedness.  I am going to have her see Cardiology for monitoring.          Patient Care Team:  Kameron Donato MD as PCP - General (Internal Medicine)  Melina Paramr MA as Care Coordinator  Tessa Baltazar OD as Consulting Physician (Optometry)    Patient Active Problem List    Diagnosis Date Noted    Calculus of gallbladder without " cholecystitis without obstruction incidental on RUQ US 1/2021 01/22/2021 1/22/2021 RUQ US: Cholelithiasis.  No gallbladder wall thickening or pericholecystic fluid.  Negative sonographic Taylor sign.      Elevated alkaline phosphatase level, liver fraction, 1/2021 RUQ US normal except incidental gallstones 01/22/2021    Microcytic anemia mentzer index < 13, suspect this is underlying thalassemia. normal ferritin 04/09/2019    RLS (restless legs syndrome) 07/05/2018    Nonrheumatic aortic valve stenosis 6/2018 TTE trivial to mild AoS 06/06/2018    Nuclear sclerosis of both eyes 05/11/2018    Refractive errors 05/11/2018    Diabetes mellitus type 2 without retinopathy 09/20/2016    Mixed hyperlipidemia; 7/2018 prava to atorva 09/18/2012    Essential hypertension 09/18/2012    Type 2 diabetes mellitus without complication, without long-term current use of insulin; januvia expensive;1/2020 actos 09/18/2012    H/O: stroke with residual effects 09/18/2012       PAST MEDICAL PROBLEMS, PAST SURGICAL HISTORY: please see relevant portions of the electronic medical record    ALLERGIES AND MEDICATIONS: updated and reviewed.  Review of patient's allergies indicates:   Allergen Reactions    Citalopram analogues Other (See Comments)     Hallucinations       Medication List with Changes/Refills   Current Medications    AMLODIPINE (NORVASC) 10 MG TABLET    Take 1 tablet by mouth once daily    ASPIRIN (ECOTRIN) 81 MG EC TABLET    Take 1 tablet (81 mg total) by mouth once daily.    ATORVASTATIN (LIPITOR) 40 MG TABLET    Take 1 tablet (40 mg total) by mouth once daily.    BLOOD SUGAR DIAGNOSTIC (TRUE METRIX GLUCOSE TEST STRIP) STRP    Daily glucose testing; whichever brand covered by insurance.    GLIPIZIDE (GLUCOTROL) 10 MG TR24    Take 1 tablet (10 mg total) by mouth daily with breakfast.    LANCETS (ONETOUCH DELICA LANCETS) 33 GAUGE MISC    1 lancet by Misc.(Non-Drug; Combo Route) route once daily. ONCE DAILY BLOOD SUGAR  "TESTING; WHICHEVER BRAND COVERED BY INSURANCE    LOSARTAN (COZAAR) 100 MG TABLET    Take 1 tablet (100 mg total) by mouth once daily.    METFORMIN (GLUCOPHAGE-XR) 500 MG ER 24HR TABLET    Take 2 tablets (1,000 mg total) by mouth 2 (two) times daily with meals.    METOPROLOL SUCCINATE (TOPROL-XL) 100 MG 24 HR TABLET    Take 1 tablet (100 mg total) by mouth once daily.    MONTELUKAST (SINGULAIR) 10 MG TABLET    Take 10 mg by mouth every evening.    MULTIVIT-MIN-FERROUS GLUCONATE 9 MG IRON/15 ML ORAL LIQUID    Take by mouth. 1 Liquid Oral Every day    PIOGLITAZONE (ACTOS) 15 MG TABLET    Take 1 tablet (15 mg total) by mouth once daily.    ROPINIROLE (REQUIP) 1 MG TABLET    Take 1 tablet (1 mg total) by mouth 2 (two) times daily as needed (restless legs).    SEMAGLUTIDE (RYBELSUS) 3 MG TABLET    Take 1 tablet (3 mg total) by mouth once daily. Lower dose    TRAZODONE (DESYREL) 50 MG TABLET    Take 0.5 tablets (25 mg total) by mouth nightly as needed for Insomnia.        Objective:   Physical Exam   Vitals:    12/19/22 1437   BP: 138/64   BP Location: Left arm   Patient Position: Sitting   BP Method: Medium (Manual)   Pulse: 81   Temp: 97.6 °F (36.4 °C)   TempSrc: Oral   SpO2: 99%   Weight: 68.9 kg (151 lb 12.6 oz)   Height: 5' 3" (1.6 m)    Body mass index is 26.89 kg/m².  Weight: 68.9 kg (151 lb 12.6 oz)   Height: 5' 3" (160 cm)     Physical Exam  Constitutional:       General: She is not in acute distress.     Appearance: She is well-developed.   Eyes:      Extraocular Movements: Extraocular movements intact.   Cardiovascular:      Rate and Rhythm: Normal rate and regular rhythm.      Heart sounds: Murmur heard.      Comments: 3/6 systolic murmur radiating to carotids  Pulmonary:      Effort: Pulmonary effort is normal.      Breath sounds: Normal breath sounds.   Musculoskeletal:         General: Normal range of motion.      Right lower leg: No edema.      Left lower leg: No edema.   Skin:     General: Skin is warm and " dry.   Neurological:      Mental Status: She is alert and oriented to person, place, and time.      Coordination: Coordination normal.   Psychiatric:         Behavior: Behavior normal.         Thought Content: Thought content normal.       Component      Latest Ref Rng & Units 12/12/2022 9/21/2022   WBC      3.90 - 12.70 K/uL 9.01 7.06   RBC      4.00 - 5.40 M/uL 5.54 (H) 5.72 (H)   HEMOGLOBIN      12.0 - 16.0 g/dL 11.7 (L) 11.9 (L)   HEMATOCRIT      37.0 - 48.5 % 39.7 41.0   MCV      82 - 98 fL 72 (L) 72 (L)   MCH      27.0 - 31.0 pg 21.1 (L) 20.8 (L)   MCHC      32.0 - 36.0 g/dL 29.5 (L) 29.0 (L)   RDW      11.5 - 14.5 % 14.8 (H) 15.3 (H)   Platelets      150 - 450 K/uL 303 274   MPV      9.2 - 12.9 fL 10.6 10.7   Immature Granulocytes      0.0 - 0.5 % 0.1 0.1   Gran # (ANC)      1.8 - 7.7 K/uL 4.1 3.1   Immature Grans (Abs)      0.00 - 0.04 K/uL 0.01 0.01   Lymph #      1.0 - 4.8 K/uL 4.0 3.2   Mono #      0.3 - 1.0 K/uL 0.6 0.5   Eos #      0.0 - 0.5 K/uL 0.2 0.2   Baso #      0.00 - 0.20 K/uL 0.09 0.06   nRBC      0 /100 WBC 0 0   Gran %      38.0 - 73.0 % 45.7 43.6   Lymph %      18.0 - 48.0 % 44.2 45.5   Mono %      4.0 - 15.0 % 7.1 7.6   Eosinophil %      0.0 - 8.0 % 1.9 2.4   Basophil %      0.0 - 1.9 % 1.0 0.8   Differential Method       Automated Automated   Sodium      136 - 145 mmol/L 139 135 (L)   Potassium      3.5 - 5.1 mmol/L 4.5 4.4   Chloride      95 - 110 mmol/L 102 101   CO2      23 - 29 mmol/L 24 24   Glucose      70 - 110 mg/dL 184 (H) 314 (H)   BUN      8 - 23 mg/dL 15 12   Creatinine      0.5 - 1.4 mg/dL 0.8 0.9   Calcium      8.7 - 10.5 mg/dL 10.1 9.7   PROTEIN TOTAL      6.0 - 8.4 g/dL 8.0 7.9   Albumin      3.5 - 5.2 g/dL 4.1 4.1   BILIRUBIN TOTAL      0.1 - 1.0 mg/dL 1.2 (H) 1.0   Alkaline Phosphatase      55 - 135 U/L 102 116   AST      10 - 40 U/L 28 30   ALT      10 - 44 U/L 22 27   ANION GAP      8 - 16 mmol/L 13 10   eGFR      >60 mL/min/1.73 m:2 >60.0 >60.0   Hemoglobin A1C  External      4.0 - 5.6 % 7.2 (H) 9.5 (H)   Estimated Avg Glucose      68 - 131 mg/dL 160 (H) 226 (H)   Ferritin      20.0 - 300.0 ng/mL 37

## 2022-12-19 ENCOUNTER — OFFICE VISIT (OUTPATIENT)
Dept: FAMILY MEDICINE | Facility: CLINIC | Age: 77
End: 2022-12-19
Payer: MEDICARE

## 2022-12-19 VITALS
TEMPERATURE: 98 F | OXYGEN SATURATION: 99 % | BODY MASS INDEX: 26.9 KG/M2 | DIASTOLIC BLOOD PRESSURE: 64 MMHG | HEART RATE: 81 BPM | HEIGHT: 63 IN | SYSTOLIC BLOOD PRESSURE: 138 MMHG | WEIGHT: 151.81 LBS

## 2022-12-19 DIAGNOSIS — D50.9 MICROCYTIC ANEMIA: ICD-10-CM

## 2022-12-19 DIAGNOSIS — E78.2 MIXED HYPERLIPIDEMIA: ICD-10-CM

## 2022-12-19 DIAGNOSIS — I35.0 NONRHEUMATIC AORTIC VALVE STENOSIS: ICD-10-CM

## 2022-12-19 DIAGNOSIS — I10 ESSENTIAL HYPERTENSION: ICD-10-CM

## 2022-12-19 DIAGNOSIS — E11.9 TYPE 2 DIABETES MELLITUS WITHOUT COMPLICATION, WITHOUT LONG-TERM CURRENT USE OF INSULIN: Primary | ICD-10-CM

## 2022-12-19 PROCEDURE — 99214 OFFICE O/P EST MOD 30 MIN: CPT | Mod: S$GLB,,, | Performed by: INTERNAL MEDICINE

## 2022-12-19 PROCEDURE — 1159F PR MEDICATION LIST DOCUMENTED IN MEDICAL RECORD: ICD-10-PCS | Mod: CPTII,S$GLB,, | Performed by: INTERNAL MEDICINE

## 2022-12-19 PROCEDURE — 99999 PR PBB SHADOW E&M-EST. PATIENT-LVL V: ICD-10-PCS | Mod: PBBFAC,,, | Performed by: INTERNAL MEDICINE

## 2022-12-19 PROCEDURE — 3078F PR MOST RECENT DIASTOLIC BLOOD PRESSURE < 80 MM HG: ICD-10-PCS | Mod: CPTII,S$GLB,, | Performed by: INTERNAL MEDICINE

## 2022-12-19 PROCEDURE — 3078F DIAST BP <80 MM HG: CPT | Mod: CPTII,S$GLB,, | Performed by: INTERNAL MEDICINE

## 2022-12-19 PROCEDURE — 3075F SYST BP GE 130 - 139MM HG: CPT | Mod: CPTII,S$GLB,, | Performed by: INTERNAL MEDICINE

## 2022-12-19 PROCEDURE — 1159F MED LIST DOCD IN RCRD: CPT | Mod: CPTII,S$GLB,, | Performed by: INTERNAL MEDICINE

## 2022-12-19 PROCEDURE — 1160F PR REVIEW ALL MEDS BY PRESCRIBER/CLIN PHARMACIST DOCUMENTED: ICD-10-PCS | Mod: CPTII,S$GLB,, | Performed by: INTERNAL MEDICINE

## 2022-12-19 PROCEDURE — 1160F RVW MEDS BY RX/DR IN RCRD: CPT | Mod: CPTII,S$GLB,, | Performed by: INTERNAL MEDICINE

## 2022-12-19 PROCEDURE — 1126F PR PAIN SEVERITY QUANTIFIED, NO PAIN PRESENT: ICD-10-PCS | Mod: CPTII,S$GLB,, | Performed by: INTERNAL MEDICINE

## 2022-12-19 PROCEDURE — 1126F AMNT PAIN NOTED NONE PRSNT: CPT | Mod: CPTII,S$GLB,, | Performed by: INTERNAL MEDICINE

## 2022-12-19 PROCEDURE — 3075F PR MOST RECENT SYSTOLIC BLOOD PRESS GE 130-139MM HG: ICD-10-PCS | Mod: CPTII,S$GLB,, | Performed by: INTERNAL MEDICINE

## 2022-12-19 PROCEDURE — 99214 PR OFFICE/OUTPT VISIT, EST, LEVL IV, 30-39 MIN: ICD-10-PCS | Mod: S$GLB,,, | Performed by: INTERNAL MEDICINE

## 2022-12-19 PROCEDURE — 99999 PR PBB SHADOW E&M-EST. PATIENT-LVL V: CPT | Mod: PBBFAC,,, | Performed by: INTERNAL MEDICINE

## 2022-12-29 ENCOUNTER — OFFICE VISIT (OUTPATIENT)
Dept: CARDIOLOGY | Facility: CLINIC | Age: 77
End: 2022-12-29
Payer: MEDICARE

## 2022-12-29 VITALS
RESPIRATION RATE: 15 BRPM | WEIGHT: 152.13 LBS | HEART RATE: 83 BPM | HEIGHT: 63 IN | SYSTOLIC BLOOD PRESSURE: 132 MMHG | BODY MASS INDEX: 26.95 KG/M2 | OXYGEN SATURATION: 99 % | DIASTOLIC BLOOD PRESSURE: 68 MMHG

## 2022-12-29 DIAGNOSIS — I35.0 NONRHEUMATIC AORTIC VALVE STENOSIS: ICD-10-CM

## 2022-12-29 DIAGNOSIS — I10 ESSENTIAL HYPERTENSION: Primary | ICD-10-CM

## 2022-12-29 DIAGNOSIS — R06.02 SOB (SHORTNESS OF BREATH): ICD-10-CM

## 2022-12-29 PROCEDURE — 1160F PR REVIEW ALL MEDS BY PRESCRIBER/CLIN PHARMACIST DOCUMENTED: ICD-10-PCS | Mod: CPTII,S$GLB,, | Performed by: INTERNAL MEDICINE

## 2022-12-29 PROCEDURE — 1101F PT FALLS ASSESS-DOCD LE1/YR: CPT | Mod: CPTII,S$GLB,, | Performed by: INTERNAL MEDICINE

## 2022-12-29 PROCEDURE — 1160F RVW MEDS BY RX/DR IN RCRD: CPT | Mod: CPTII,S$GLB,, | Performed by: INTERNAL MEDICINE

## 2022-12-29 PROCEDURE — 93000 ELECTROCARDIOGRAM COMPLETE: CPT | Mod: S$GLB,,, | Performed by: INTERNAL MEDICINE

## 2022-12-29 PROCEDURE — 1101F PR PT FALLS ASSESS DOC 0-1 FALLS W/OUT INJ PAST YR: ICD-10-PCS | Mod: CPTII,S$GLB,, | Performed by: INTERNAL MEDICINE

## 2022-12-29 PROCEDURE — 3288F PR FALLS RISK ASSESSMENT DOCUMENTED: ICD-10-PCS | Mod: CPTII,S$GLB,, | Performed by: INTERNAL MEDICINE

## 2022-12-29 PROCEDURE — 1126F AMNT PAIN NOTED NONE PRSNT: CPT | Mod: CPTII,S$GLB,, | Performed by: INTERNAL MEDICINE

## 2022-12-29 PROCEDURE — 3075F PR MOST RECENT SYSTOLIC BLOOD PRESS GE 130-139MM HG: ICD-10-PCS | Mod: CPTII,S$GLB,, | Performed by: INTERNAL MEDICINE

## 2022-12-29 PROCEDURE — 3288F FALL RISK ASSESSMENT DOCD: CPT | Mod: CPTII,S$GLB,, | Performed by: INTERNAL MEDICINE

## 2022-12-29 PROCEDURE — 3075F SYST BP GE 130 - 139MM HG: CPT | Mod: CPTII,S$GLB,, | Performed by: INTERNAL MEDICINE

## 2022-12-29 PROCEDURE — 1159F MED LIST DOCD IN RCRD: CPT | Mod: CPTII,S$GLB,, | Performed by: INTERNAL MEDICINE

## 2022-12-29 PROCEDURE — 93000 EKG 12-LEAD: ICD-10-PCS | Mod: S$GLB,,, | Performed by: INTERNAL MEDICINE

## 2022-12-29 PROCEDURE — 99999 PR PBB SHADOW E&M-EST. PATIENT-LVL V: CPT | Mod: PBBFAC,,, | Performed by: INTERNAL MEDICINE

## 2022-12-29 PROCEDURE — 99204 OFFICE O/P NEW MOD 45 MIN: CPT | Mod: S$GLB,,, | Performed by: INTERNAL MEDICINE

## 2022-12-29 PROCEDURE — 99999 PR PBB SHADOW E&M-EST. PATIENT-LVL V: ICD-10-PCS | Mod: PBBFAC,,, | Performed by: INTERNAL MEDICINE

## 2022-12-29 PROCEDURE — 99204 PR OFFICE/OUTPT VISIT, NEW, LEVL IV, 45-59 MIN: ICD-10-PCS | Mod: S$GLB,,, | Performed by: INTERNAL MEDICINE

## 2022-12-29 PROCEDURE — 1126F PR PAIN SEVERITY QUANTIFIED, NO PAIN PRESENT: ICD-10-PCS | Mod: CPTII,S$GLB,, | Performed by: INTERNAL MEDICINE

## 2022-12-29 PROCEDURE — 1159F PR MEDICATION LIST DOCUMENTED IN MEDICAL RECORD: ICD-10-PCS | Mod: CPTII,S$GLB,, | Performed by: INTERNAL MEDICINE

## 2022-12-29 PROCEDURE — 3078F DIAST BP <80 MM HG: CPT | Mod: CPTII,S$GLB,, | Performed by: INTERNAL MEDICINE

## 2022-12-29 PROCEDURE — 3078F PR MOST RECENT DIASTOLIC BLOOD PRESSURE < 80 MM HG: ICD-10-PCS | Mod: CPTII,S$GLB,, | Performed by: INTERNAL MEDICINE

## 2022-12-29 NOTE — PROGRESS NOTES
CARDIOVASCULAR CONSULTATION    REASON FOR CONSULT:   Ramya Mayo is a 77 y.o. female who presents for aortic stenosis    HISTORY OF PRESENT ILLNESS:     Patient is a pleasant 77-year-old female who is here for follow-up of her aortic stenosis.  Main complaint is dyspnea on exertion which she started noticing a few months ago.  Denies any chest pains or tightness.  Per echo done in 2018 had mild aortic stenosis.      CONCLUSIONS     1 - Hyperdynamic left ventricular systolic function (EF >70%).     2 - Trivial to mild aortic stenosis, LUISANA = 1.7 cm2, AVAi = 0.98 cm2/m2, peak velocity = 2.42 m/s, mean gradient = 12 mmHg.     3 - Trivial mitral regurgitation.     4 - Trivial tricuspid regurgitation.     5 - Trivial pulmonic regurgitation.        PAST MEDICAL HISTORY:     Past Medical History:   Diagnosis Date    Diabetes mellitus type II     Hyperlipidemia     Hypertension     Nuclear sclerosis of both eyes 2018    Stroke        PAST SURGICAL HISTORY:     Past Surgical History:   Procedure Laterality Date    4 MISCARRIAGES       8 PARA 4      HYSTERECTOMY      laser right eye  Right ? yrs    pt had stroke and tried to bring vision back        ALLERGIES AND MEDICATION:     Review of patient's allergies indicates:   Allergen Reactions    Citalopram analogues Other (See Comments)     Hallucinations        Medication List            Accurate as of 2022  9:25 AM. If you have any questions, ask your nurse or doctor.                CONTINUE taking these medications      amLODIPine 10 MG tablet  Commonly known as: NORVASC  Take 1 tablet by mouth once daily     aspirin 81 MG EC tablet  Commonly known as: ECOTRIN  Take 1 tablet (81 mg total) by mouth once daily.     atorvastatin 40 MG tablet  Commonly known as: LIPITOR  Take 1 tablet (40 mg total) by mouth once daily.     glipiZIDE 10 MG Tr24  Commonly known as: GLUCOTROL  Take 1 tablet (10 mg total) by mouth daily with breakfast.     lancets 33  gauge Misc  Commonly known as: ONETOUCH DELICA LANCETS  1 lancet by Misc.(Non-Drug; Combo Route) route once daily. ONCE DAILY BLOOD SUGAR TESTING; WHICHEVER BRAND COVERED BY INSURANCE     losartan 100 MG tablet  Commonly known as: COZAAR  Take 1 tablet (100 mg total) by mouth once daily.     metFORMIN 500 MG ER 24hr tablet  Commonly known as: GLUCOPHAGE-XR  Take 2 tablets (1,000 mg total) by mouth 2 (two) times daily with meals.     metoprolol succinate 100 MG 24 hr tablet  Commonly known as: TOPROL-XL  Take 1 tablet (100 mg total) by mouth once daily.     montelukast 10 mg tablet  Commonly known as: SINGULAIR     multivit-min-ferrous gluconate 9 mg iron/15 mL oral liquid     pioglitazone 15 MG tablet  Commonly known as: ACTOS  Take 1 tablet (15 mg total) by mouth once daily.     rOPINIRole 1 MG tablet  Commonly known as: REQUIP  Take 1 tablet (1 mg total) by mouth 2 (two) times daily as needed (restless legs).     traZODone 50 MG tablet  Commonly known as: DESYREL  Take 0.5 tablets (25 mg total) by mouth nightly as needed for Insomnia.     TRUE METRIX GLUCOSE TEST STRIP Strp  Generic drug: blood sugar diagnostic  Daily glucose testing; whichever brand covered by insurance.              SOCIAL HISTORY:     Social History     Socioeconomic History    Marital status:    Tobacco Use    Smoking status: Never    Smokeless tobacco: Never   Substance and Sexual Activity    Alcohol use: No    Drug use: No   Social History Narrative    Lives in [place].    2014.         FAMILY HISTORY:     Family History   Problem Relation Age of Onset    Diabetes Mother     Hypertension Mother     Kidney disease Mother     Vision loss Father     Cancer Father     Glaucoma Father     Heart disease Son     Hyperlipidemia Son     No Known Problems Sister     Prostate cancer Brother     No Known Problems Maternal Aunt     No Known Problems Maternal Uncle     No Known Problems Paternal Aunt     No Known Problems  "Paternal Uncle     No Known Problems Maternal Grandmother     No Known Problems Maternal Grandfather     No Known Problems Paternal Grandmother     No Known Problems Paternal Grandfather     Amblyopia Neg Hx     Blindness Neg Hx     Cataracts Neg Hx     Macular degeneration Neg Hx     Retinal detachment Neg Hx     Strabismus Neg Hx     Stroke Neg Hx     Thyroid disease Neg Hx        REVIEW OF SYSTEMS:   Review of Systems   Constitutional: Negative.   HENT: Negative.     Eyes: Negative.    Cardiovascular:  Positive for dyspnea on exertion.   Respiratory: Negative.     Endocrine: Negative.    Hematologic/Lymphatic: Negative.    Skin: Negative.    Musculoskeletal: Negative.    Gastrointestinal: Negative.    Genitourinary: Negative.    Neurological: Negative.    Psychiatric/Behavioral: Negative.     Allergic/Immunologic: Negative.      A 10 point review of systems was performed and all the pertinent positives have been mentioned. Rest of review of systems was negative.        PHYSICAL EXAM:     Vitals:    12/29/22 0830   BP: 132/68   Pulse: 83   Resp: 15    Body mass index is 26.95 kg/m².  Weight: 69 kg (152 lb 2.2 oz)   Height: 5' 3" (160 cm)     Physical Exam  Constitutional:       Appearance: Normal appearance. She is well-developed.   HENT:      Head: Normocephalic.   Eyes:      Pupils: Pupils are equal, round, and reactive to light.   Cardiovascular:      Rate and Rhythm: Normal rate and regular rhythm.      Heart sounds: Murmur heard.   Pulmonary:      Effort: Pulmonary effort is normal.      Breath sounds: Normal breath sounds.   Abdominal:      General: Bowel sounds are normal.      Palpations: Abdomen is soft.      Tenderness: There is no abdominal tenderness.   Musculoskeletal:         General: Normal range of motion.      Cervical back: Normal range of motion and neck supple.   Skin:     General: Skin is warm.   Neurological:      Mental Status: She is alert and oriented to person, place, and time. "         DATA:     Laboratory:  CBC:  Recent Labs   Lab 02/11/22  0913 09/21/22  0925 12/12/22  0856   WBC 9.71 7.06 9.01   Hemoglobin 12.1 11.9 L 11.7 L   Hematocrit 41.6 41.0 39.7   Platelets 298 274 303       CHEMISTRIES:  Recent Labs   Lab 08/12/21  1010 02/11/22  0913 05/17/22  0755 09/21/22  0925 12/12/22  0856   Glucose 152 H 287 H 209 H 314 H 184 H   Sodium 141 141 137 135 L 139   Potassium 4.1 4.2 4.1 4.4 4.5   BUN 14 12 13 12 15   Creatinine 0.8 0.9 0.8 0.9 0.8   eGFR if African American >60 >60.0 >60.0  --   --    eGFR if non African American >60 >60.0 >60.0  --   --    Calcium 9.4 9.7 9.4 9.7 10.1       CARDIAC BIOMARKERS:        COAGS:        LIPIDS/LFTS:  Recent Labs   Lab 11/23/20  1150 04/14/21  1000 04/14/21  1026 02/11/22  0913 05/17/22  0755 09/21/22  0925 12/12/22  0856   Cholesterol 165 167  --  142  --   --   --    Triglycerides 189 H 188 H  --  158 H  --   --   --    HDL 47 50  --  46  --   --   --    LDL Cholesterol 80.2 79.4  --  64.4  --   --   --    Non-HDL Cholesterol 118 117  --  96  --   --   --    AST 45 H  --    < > 41 H 34 30 28   ALT 50 H  --    < > 32 32 27 22    < > = values in this interval not displayed.       Hemoglobin A1C   Date Value Ref Range Status   12/12/2022 7.2 (H) 4.0 - 5.6 % Final     Comment:     ADA Screening Guidelines:  5.7-6.4%  Consistent with prediabetes  >or=6.5%  Consistent with diabetes    High levels of fetal hemoglobin interfere with the HbA1C  assay. Heterozygous hemoglobin variants (HbS, HgC, etc)do  not significantly interfere with this assay.   However, presence of multiple variants may affect accuracy.     09/21/2022 9.5 (H) 4.0 - 5.6 % Final     Comment:     ADA Screening Guidelines:  5.7-6.4%  Consistent with prediabetes  >or=6.5%  Consistent with diabetes    High levels of fetal hemoglobin interfere with the HbA1C  assay. Heterozygous hemoglobin variants (HbS, HgC, etc)do  not significantly interfere with this assay.   However, presence of multiple  variants may affect accuracy.     05/17/2022 7.8 (H) 4.0 - 5.6 % Final     Comment:     ADA Screening Guidelines:  5.7-6.4%  Consistent with prediabetes  >or=6.5%  Consistent with diabetes    High levels of fetal hemoglobin interfere with the HbA1C  assay. Heterozygous hemoglobin variants (HbS, HgC, etc)do  not significantly interfere with this assay.   However, presence of multiple variants may affect accuracy.         TSH  Recent Labs   Lab 08/12/21  1010   TSH 1.557       The 10-year ASCVD risk score (Johnna WELCH, et al., 2019) is: 53.2%    Values used to calculate the score:      Age: 77 years      Sex: Female      Is Non- : No      Diabetic: Yes      Tobacco smoker: No      Systolic Blood Pressure: 132 mmHg      Is BP treated: Yes      HDL Cholesterol: 46 mg/dL      Total Cholesterol: 142 mg/dL             ASSESSMENT AND PLAN     Patient Active Problem List   Diagnosis    Mixed hyperlipidemia; 7/2018 prava to atorva    Essential hypertension    Type 2 diabetes mellitus without complication, without long-term current use of insulin; januvia expensive;1/2020 actos    H/O: stroke with residual effects    Diabetes mellitus type 2 without retinopathy    Nuclear sclerosis of both eyes    Refractive errors    Nonrheumatic aortic valve stenosis 6/2018 TTE trivial to mild AoS    RLS (restless legs syndrome)    Microcytic anemia mentzer index < 13, suspect this is underlying thalassemia. normal ferritin    Calculus of gallbladder without cholecystitis without obstruction incidental on RUQ US 1/2021    Elevated alkaline phosphatase level, liver fraction, 1/2021 RUQ US normal except incidental gallstones     Aortic stenosis, mild on prior echo in 2018.  Recheck 2D echocardiogram     Dyspnea on exertion:  Multiple risk factors for coronary artery disease.  Check echo and stress test     Follow-up after testing            Thank you very much for involving me in the care of your patient.  Please do not  hesitate to contact me if there are any questions.      Roxanna Mcpherson MD, FAC, [facility]  Interventional Cardiologist, [facility].           This note was dictated with the help of speech recognition software.  There might be un-intended errors and/or substitutions.

## 2023-01-11 ENCOUNTER — HOSPITAL ENCOUNTER (OUTPATIENT)
Dept: RADIOLOGY | Facility: HOSPITAL | Age: 78
Discharge: HOME | End: 2023-01-11
Attending: INTERNAL MEDICINE
Payer: MEDICARE

## 2023-01-11 ENCOUNTER — HOSPITAL ENCOUNTER (OUTPATIENT)
Dept: CARDIOLOGY | Facility: HOSPITAL | Age: 78
Discharge: HOME | End: 2023-01-11
Attending: INTERNAL MEDICINE
Payer: MEDICARE

## 2023-01-11 DIAGNOSIS — I10 ESSENTIAL HYPERTENSION: ICD-10-CM

## 2023-01-11 DIAGNOSIS — I35.0 NONRHEUMATIC AORTIC VALVE STENOSIS: ICD-10-CM

## 2023-01-11 DIAGNOSIS — R06.02 SOB (SHORTNESS OF BREATH): ICD-10-CM

## 2023-01-11 LAB
ASCENDING AORTA: 2.57 CM
AV INDEX (PROSTH): 0.34
AV MEAN GRADIENT: 30 MMHG
AV PEAK GRADIENT: 50 MMHG
AV VALVE AREA: 1.04 CM2
AV VELOCITY RATIO: 0.3
CV ECHO LV RWT: 0.6 CM
CV STRESS BASE HR: 78 BPM
DIASTOLIC BLOOD PRESSURE: 72 MMHG
DOP CALC AO PEAK VEL: 3.53 M/S
DOP CALC AO VTI: 82 CM
DOP CALC LVOT AREA: 3.1 CM2
DOP CALC LVOT DIAMETER: 1.99 CM
DOP CALC LVOT PEAK VEL: 1.07 M/S
DOP CALC LVOT STROKE VOLUME: 85.49 CM3
DOP CALCLVOT PEAK VEL VTI: 27.5 CM
E WAVE DECELERATION TIME: 249.08 MSEC
E/A RATIO: 0.78
E/E' RATIO: 14.71 M/S
ECHO LV POSTERIOR WALL: 1.15 CM (ref 0.6–1.1)
EJECTION FRACTION: 65 %
FRACTIONAL SHORTENING: 27 % (ref 28–44)
INTERVENTRICULAR SEPTUM: 1.04 CM (ref 0.6–1.1)
IVC DIAMETER: 1.7 CM
IVRT: 76.12 MSEC
LA MAJOR: 5.25 CM
LA MINOR: 5.24 CM
LA WIDTH: 4.8 CM
LEFT ATRIUM SIZE: 3.84 CM
LEFT ATRIUM VOLUME: 82.17 CM3
LEFT INTERNAL DIMENSION IN SYSTOLE: 2.79 CM (ref 2.1–4)
LEFT VENTRICLE DIASTOLIC VOLUME: 62.56 ML
LEFT VENTRICLE SYSTOLIC VOLUME: 29.18 ML
LEFT VENTRICULAR INTERNAL DIMENSION IN DIASTOLE: 3.82 CM (ref 3.5–6)
LEFT VENTRICULAR MASS: 134.83 G
LV LATERAL E/E' RATIO: 12.88 M/S
LV SEPTAL E/E' RATIO: 17.17 M/S
LVOT MG: 2.79 MMHG
LVOT MV: 0.8 CM/S
MV PEAK A VEL: 1.32 M/S
MV PEAK E VEL: 1.03 M/S
MV STENOSIS PRESSURE HALF TIME: 72.23 MS
MV VALVE AREA P 1/2 METHOD: 3.05 CM2
NUC REST EJECTION FRACTION: 92
OHS CV CPX 85 PERCENT MAX PREDICTED HEART RATE MALE: 116
OHS CV CPX MAX PREDICTED HEART RATE: 136
OHS CV CPX PATIENT IS FEMALE: 1
OHS CV CPX PATIENT IS MALE: 0
OHS CV CPX PEAK DIASTOLIC BLOOD PRESSURE: 68 MMHG
OHS CV CPX PEAK HEAR RATE: 95 BPM
OHS CV CPX PEAK RATE PRESSURE PRODUCT: NORMAL
OHS CV CPX PEAK SYSTOLIC BLOOD PRESSURE: 161 MMHG
OHS CV CPX PERCENT MAX PREDICTED HEART RATE ACHIEVED: 70
OHS CV CPX RATE PRESSURE PRODUCT PRESENTING: NORMAL
PISA TR MAX VEL: 2.62 M/S
PV PEAK VELOCITY: 1.01 CM/S
RA MAJOR: 4.34 CM
RA PRESSURE: 3 MMHG
RA WIDTH: 3.5 CM
RIGHT VENTRICULAR END-DIASTOLIC DIMENSION: 3.26 CM
SINUS: 3.1 CM
STJ: 2.07 CM
SYSTOLIC BLOOD PRESSURE: 168 MMHG
TDI LATERAL: 0.08 M/S
TDI SEPTAL: 0.06 M/S
TDI: 0.07 M/S
TR MAX PG: 27 MMHG
TRICUSPID ANNULAR PLANE SYSTOLIC EXCURSION: 2.3 CM
TV REST PULMONARY ARTERY PRESSURE: 30 MMHG

## 2023-01-11 PROCEDURE — 93306 TTE W/DOPPLER COMPLETE: CPT

## 2023-01-11 PROCEDURE — 93016 CV STRESS TEST SUPVJ ONLY: CPT | Mod: ,,, | Performed by: INTERNAL MEDICINE

## 2023-01-11 PROCEDURE — 93017 CV STRESS TEST TRACING ONLY: CPT

## 2023-01-11 PROCEDURE — 63600175 PHARM REV CODE 636 W HCPCS: Performed by: INTERNAL MEDICINE

## 2023-01-11 PROCEDURE — A9502 TC99M TETROFOSMIN: HCPCS

## 2023-01-11 PROCEDURE — 78452 NUCLEAR STRESS - CARDIOLOGY INTERPRETED (CUPID ONLY): ICD-10-PCS | Mod: 26,,, | Performed by: INTERNAL MEDICINE

## 2023-01-11 PROCEDURE — 93306 ECHO (CUPID ONLY): ICD-10-PCS | Mod: 26,,, | Performed by: INTERNAL MEDICINE

## 2023-01-11 PROCEDURE — 93306 TTE W/DOPPLER COMPLETE: CPT | Mod: 26,,, | Performed by: INTERNAL MEDICINE

## 2023-01-11 PROCEDURE — 93018 NUCLEAR STRESS - CARDIOLOGY INTERPRETED (CUPID ONLY): ICD-10-PCS | Mod: ,,, | Performed by: INTERNAL MEDICINE

## 2023-01-11 PROCEDURE — 78452 HT MUSCLE IMAGE SPECT MULT: CPT | Mod: 26,,, | Performed by: INTERNAL MEDICINE

## 2023-01-11 PROCEDURE — 93018 CV STRESS TEST I&R ONLY: CPT | Mod: ,,, | Performed by: INTERNAL MEDICINE

## 2023-01-11 PROCEDURE — 93016 NUCLEAR STRESS - CARDIOLOGY INTERPRETED (CUPID ONLY): ICD-10-PCS | Mod: ,,, | Performed by: INTERNAL MEDICINE

## 2023-01-11 PROCEDURE — 78452 HT MUSCLE IMAGE SPECT MULT: CPT

## 2023-01-11 RX ORDER — REGADENOSON 0.08 MG/ML
0.4 INJECTION, SOLUTION INTRAVENOUS ONCE
Status: COMPLETED | OUTPATIENT
Start: 2023-01-11 | End: 2023-01-11

## 2023-01-11 RX ADMIN — REGADENOSON 0.4 MG: 0.08 INJECTION, SOLUTION INTRAVENOUS at 08:01

## 2023-03-01 ENCOUNTER — OFFICE VISIT (OUTPATIENT)
Dept: CARDIOLOGY | Facility: CLINIC | Age: 78
End: 2023-03-01
Payer: MEDICARE

## 2023-03-01 VITALS
HEIGHT: 63 IN | SYSTOLIC BLOOD PRESSURE: 138 MMHG | WEIGHT: 151 LBS | HEART RATE: 76 BPM | OXYGEN SATURATION: 98 % | DIASTOLIC BLOOD PRESSURE: 64 MMHG | BODY MASS INDEX: 26.75 KG/M2 | RESPIRATION RATE: 15 BRPM

## 2023-03-01 DIAGNOSIS — I35.0 NONRHEUMATIC AORTIC VALVE STENOSIS: Primary | ICD-10-CM

## 2023-03-01 DIAGNOSIS — I10 ESSENTIAL HYPERTENSION: ICD-10-CM

## 2023-03-01 DIAGNOSIS — E78.5 DYSLIPIDEMIA: ICD-10-CM

## 2023-03-01 DIAGNOSIS — E11.51 TYPE 2 DIABETES MELLITUS WITH DIABETIC PERIPHERAL ANGIOPATHY WITHOUT GANGRENE, UNSPECIFIED WHETHER LONG TERM INSULIN USE: ICD-10-CM

## 2023-03-01 PROCEDURE — 1126F AMNT PAIN NOTED NONE PRSNT: CPT | Mod: CPTII,S$GLB,, | Performed by: INTERNAL MEDICINE

## 2023-03-01 PROCEDURE — 99214 PR OFFICE/OUTPT VISIT, EST, LEVL IV, 30-39 MIN: ICD-10-PCS | Mod: S$GLB,,, | Performed by: INTERNAL MEDICINE

## 2023-03-01 PROCEDURE — 1159F MED LIST DOCD IN RCRD: CPT | Mod: CPTII,S$GLB,, | Performed by: INTERNAL MEDICINE

## 2023-03-01 PROCEDURE — 1160F RVW MEDS BY RX/DR IN RCRD: CPT | Mod: CPTII,S$GLB,, | Performed by: INTERNAL MEDICINE

## 2023-03-01 PROCEDURE — 99999 PR PBB SHADOW E&M-EST. PATIENT-LVL IV: ICD-10-PCS | Mod: PBBFAC,,, | Performed by: INTERNAL MEDICINE

## 2023-03-01 PROCEDURE — 3075F SYST BP GE 130 - 139MM HG: CPT | Mod: CPTII,S$GLB,, | Performed by: INTERNAL MEDICINE

## 2023-03-01 PROCEDURE — 3288F PR FALLS RISK ASSESSMENT DOCUMENTED: ICD-10-PCS | Mod: CPTII,S$GLB,, | Performed by: INTERNAL MEDICINE

## 2023-03-01 PROCEDURE — 1101F PR PT FALLS ASSESS DOC 0-1 FALLS W/OUT INJ PAST YR: ICD-10-PCS | Mod: CPTII,S$GLB,, | Performed by: INTERNAL MEDICINE

## 2023-03-01 PROCEDURE — 1126F PR PAIN SEVERITY QUANTIFIED, NO PAIN PRESENT: ICD-10-PCS | Mod: CPTII,S$GLB,, | Performed by: INTERNAL MEDICINE

## 2023-03-01 PROCEDURE — 3288F FALL RISK ASSESSMENT DOCD: CPT | Mod: CPTII,S$GLB,, | Performed by: INTERNAL MEDICINE

## 2023-03-01 PROCEDURE — 3075F PR MOST RECENT SYSTOLIC BLOOD PRESS GE 130-139MM HG: ICD-10-PCS | Mod: CPTII,S$GLB,, | Performed by: INTERNAL MEDICINE

## 2023-03-01 PROCEDURE — 3078F PR MOST RECENT DIASTOLIC BLOOD PRESSURE < 80 MM HG: ICD-10-PCS | Mod: CPTII,S$GLB,, | Performed by: INTERNAL MEDICINE

## 2023-03-01 PROCEDURE — 3078F DIAST BP <80 MM HG: CPT | Mod: CPTII,S$GLB,, | Performed by: INTERNAL MEDICINE

## 2023-03-01 PROCEDURE — 99214 OFFICE O/P EST MOD 30 MIN: CPT | Mod: S$GLB,,, | Performed by: INTERNAL MEDICINE

## 2023-03-01 PROCEDURE — 99999 PR PBB SHADOW E&M-EST. PATIENT-LVL IV: CPT | Mod: PBBFAC,,, | Performed by: INTERNAL MEDICINE

## 2023-03-01 PROCEDURE — 1159F PR MEDICATION LIST DOCUMENTED IN MEDICAL RECORD: ICD-10-PCS | Mod: CPTII,S$GLB,, | Performed by: INTERNAL MEDICINE

## 2023-03-01 PROCEDURE — 1160F PR REVIEW ALL MEDS BY PRESCRIBER/CLIN PHARMACIST DOCUMENTED: ICD-10-PCS | Mod: CPTII,S$GLB,, | Performed by: INTERNAL MEDICINE

## 2023-03-01 PROCEDURE — 1101F PT FALLS ASSESS-DOCD LE1/YR: CPT | Mod: CPTII,S$GLB,, | Performed by: INTERNAL MEDICINE

## 2023-03-01 NOTE — PROGRESS NOTES
CARDIOVASCULAR CONSULTATION    REASON FOR CONSULT:   Ramya Mayo is a 77 y.o. female who presents for aortic stenosis    HISTORY OF PRESENT ILLNESS:     Patient is a pleasant 77-year-old female who is here for follow-up of her aortic stenosis.  Main complaint is dyspnea on exertion which she started noticing a few months ago.  Denies any chest pains or tightness.  Per echo done in 2018 had mild aortic stenosis.      CONCLUSIONS     1 - Hyperdynamic left ventricular systolic function (EF >70%).     2 - Trivial to mild aortic stenosis, LUISANA = 1.7 cm2, AVAi = 0.98 cm2/m2, peak velocity = 2.42 m/s, mean gradient = 12 mmHg.     3 - Trivial mitral regurgitation.     4 - Trivial tricuspid regurgitation.     5 - Trivial pulmonic regurgitation.        Notes from March 2023: Patient states that she has been doing fine.  Denies any chest pains at rest on exertion, orthopnea, PND.  Can walk about half a mi without any shortness of breath.  Echo showed moderate to severe aortic stenosis.  Stress test did not show any significant ischemia.      The left ventricle is normal in size with normal systolic function.  The estimated ejection fraction is 65%.  Grade I left ventricular diastolic dysfunction.  There is moderate-to-severe aortic valve stenosis.  Aortic valve area is 1.04 cm2; peak velocity is 3.53 m/s; mean gradient is 30 mmHg.  Severe left atrial enlargement.  Mild mitral regurgitation.  Normal right ventricular size with normal right ventricular systolic function.  Mild right atrial enlargement.  Normal central venous pressure (3 mmHg).  The estimated PA systolic pressure is 30 mmHg.        Normal myocardial perfusion scan. There is no evidence of myocardial ischemia or infarction.    There is a mild intensity perfusion abnormality in the anterior wall of the left ventricle, secondary to breast attenuation.    The gated perfusion images showed an ejection fraction of 92% at rest.    There is normal wall motion at  rest and post stress.    The ECG portion of the study is negative for ischemia.    The patient reported no chest pain during the stress test.    There were no arrhythmias during stress.        PAST MEDICAL HISTORY:     Past Medical History:   Diagnosis Date    Diabetes mellitus type II     Hyperlipidemia     Hypertension     Nuclear sclerosis of both eyes 2018    Stroke        PAST SURGICAL HISTORY:     Past Surgical History:   Procedure Laterality Date    4 MISCARRIAGES       8 PARA 4      HYSTERECTOMY      laser right eye  Right ? yrs    pt had stroke and tried to bring vision back        ALLERGIES AND MEDICATION:     Review of patient's allergies indicates:   Allergen Reactions    Citalopram analogues Other (See Comments)     Hallucinations        Medication List            Accurate as of 2023  2:56 PM. If you have any questions, ask your nurse or doctor.                CONTINUE taking these medications      amLODIPine 10 MG tablet  Commonly known as: NORVASC  Take 1 tablet by mouth once daily     aspirin 81 MG EC tablet  Commonly known as: ECOTRIN  Take 1 tablet (81 mg total) by mouth once daily.     atorvastatin 40 MG tablet  Commonly known as: LIPITOR  Take 1 tablet (40 mg total) by mouth once daily.     glipiZIDE 10 MG Tr24  Commonly known as: GLUCOTROL  Take 1 tablet (10 mg total) by mouth daily with breakfast.     lancets 33 gauge Misc  Commonly known as: ONETOUCH DELICA LANCETS  1 lancet by Misc.(Non-Drug; Combo Route) route once daily. ONCE DAILY BLOOD SUGAR TESTING; WHICHEVER BRAND COVERED BY INSURANCE     losartan 100 MG tablet  Commonly known as: COZAAR  Take 1 tablet (100 mg total) by mouth once daily.     metFORMIN 500 MG ER 24hr tablet  Commonly known as: GLUCOPHAGE-XR  Take 2 tablets (1,000 mg total) by mouth 2 (two) times daily with meals.     metoprolol succinate 100 MG 24 hr tablet  Commonly known as: TOPROL-XL  Take 1 tablet (100 mg total) by mouth once daily.      montelukast 10 mg tablet  Commonly known as: SINGULAIR     multivit-min-ferrous gluconate 9 mg iron/15 mL oral liquid     pioglitazone 15 MG tablet  Commonly known as: ACTOS  Take 1 tablet (15 mg total) by mouth once daily.     rOPINIRole 1 MG tablet  Commonly known as: REQUIP  Take 1 tablet (1 mg total) by mouth 2 (two) times daily as needed (restless legs).     traZODone 50 MG tablet  Commonly known as: DESYREL  Take 0.5 tablets (25 mg total) by mouth nightly as needed for Insomnia.     TRUE METRIX GLUCOSE TEST STRIP Strp  Generic drug: blood sugar diagnostic  Daily glucose testing; whichever brand covered by insurance.              SOCIAL HISTORY:     Social History     Socioeconomic History    Marital status:    Tobacco Use    Smoking status: Never    Smokeless tobacco: Never   Substance and Sexual Activity    Alcohol use: No    Drug use: No   Social History Narrative    Lives in [place].    2014.         FAMILY HISTORY:     Family History   Problem Relation Age of Onset    Diabetes Mother     Hypertension Mother     Kidney disease Mother     Vision loss Father     Cancer Father     Glaucoma Father     Heart disease Son     Hyperlipidemia Son     No Known Problems Sister     Prostate cancer Brother     No Known Problems Maternal Aunt     No Known Problems Maternal Uncle     No Known Problems Paternal Aunt     No Known Problems Paternal Uncle     No Known Problems Maternal Grandmother     No Known Problems Maternal Grandfather     No Known Problems Paternal Grandmother     No Known Problems Paternal Grandfather     Amblyopia Neg Hx     Blindness Neg Hx     Cataracts Neg Hx     Macular degeneration Neg Hx     Retinal detachment Neg Hx     Strabismus Neg Hx     Stroke Neg Hx     Thyroid disease Neg Hx        REVIEW OF SYSTEMS:   Review of Systems   Constitutional: Negative.   HENT: Negative.     Eyes: Negative.    Cardiovascular:  Positive for dyspnea on exertion.   Respiratory:  "Negative.     Endocrine: Negative.    Hematologic/Lymphatic: Negative.    Skin: Negative.    Musculoskeletal: Negative.    Gastrointestinal: Negative.    Genitourinary: Negative.    Neurological: Negative.    Psychiatric/Behavioral: Negative.     Allergic/Immunologic: Negative.      A 10 point review of systems was performed and all the pertinent positives have been mentioned. Rest of review of systems was negative.        PHYSICAL EXAM:     Vitals:    03/01/23 1436   BP: 138/64   Pulse: 76   Resp: 15    Body mass index is 26.75 kg/m².  Weight: 68.5 kg (151 lb 0.2 oz)   Height: 5' 3" (160 cm)     Physical Exam  Constitutional:       Appearance: Normal appearance. She is well-developed.   HENT:      Head: Normocephalic.   Eyes:      Pupils: Pupils are equal, round, and reactive to light.   Cardiovascular:      Rate and Rhythm: Normal rate and regular rhythm.      Heart sounds: Murmur heard.   Pulmonary:      Effort: Pulmonary effort is normal.      Breath sounds: Normal breath sounds.   Abdominal:      General: Bowel sounds are normal.      Palpations: Abdomen is soft.      Tenderness: There is no abdominal tenderness.   Musculoskeletal:         General: Normal range of motion.      Cervical back: Normal range of motion and neck supple.   Skin:     General: Skin is warm.   Neurological:      Mental Status: She is alert and oriented to person, place, and time.         DATA:     Laboratory:  CBC:  Recent Labs   Lab 02/11/22  0913 09/21/22  0925 12/12/22  0856   WBC 9.71 7.06 9.01   Hemoglobin 12.1 11.9 L 11.7 L   Hematocrit 41.6 41.0 39.7   Platelets 298 274 303         CHEMISTRIES:  Recent Labs   Lab 08/12/21  1010 02/11/22  0913 05/17/22  0755 09/21/22  0925 12/12/22  0856   Glucose 152 H 287 H 209 H 314 H 184 H   Sodium 141 141 137 135 L 139   Potassium 4.1 4.2 4.1 4.4 4.5   BUN 14 12 13 12 15   Creatinine 0.8 0.9 0.8 0.9 0.8   eGFR if African American >60 >60.0 >60.0  --   --    eGFR if non  >60 " >60.0 >60.0  --   --    Calcium 9.4 9.7 9.4 9.7 10.1         CARDIAC BIOMARKERS:        COAGS:        LIPIDS/LFTS:  Recent Labs   Lab 11/23/20  1150 04/14/21  1000 04/14/21  1026 02/11/22  0913 05/17/22  0755 09/21/22  0925 12/12/22  0856   Cholesterol 165 167  --  142  --   --   --    Triglycerides 189 H 188 H  --  158 H  --   --   --    HDL 47 50  --  46  --   --   --    LDL Cholesterol 80.2 79.4  --  64.4  --   --   --    Non-HDL Cholesterol 118 117  --  96  --   --   --    AST 45 H  --    < > 41 H 34 30 28   ALT 50 H  --    < > 32 32 27 22    < > = values in this interval not displayed.         Hemoglobin A1C   Date Value Ref Range Status   12/12/2022 7.2 (H) 4.0 - 5.6 % Final     Comment:     ADA Screening Guidelines:  5.7-6.4%  Consistent with prediabetes  >or=6.5%  Consistent with diabetes    High levels of fetal hemoglobin interfere with the HbA1C  assay. Heterozygous hemoglobin variants (HbS, HgC, etc)do  not significantly interfere with this assay.   However, presence of multiple variants may affect accuracy.     09/21/2022 9.5 (H) 4.0 - 5.6 % Final     Comment:     ADA Screening Guidelines:  5.7-6.4%  Consistent with prediabetes  >or=6.5%  Consistent with diabetes    High levels of fetal hemoglobin interfere with the HbA1C  assay. Heterozygous hemoglobin variants (HbS, HgC, etc)do  not significantly interfere with this assay.   However, presence of multiple variants may affect accuracy.     05/17/2022 7.8 (H) 4.0 - 5.6 % Final     Comment:     ADA Screening Guidelines:  5.7-6.4%  Consistent with prediabetes  >or=6.5%  Consistent with diabetes    High levels of fetal hemoglobin interfere with the HbA1C  assay. Heterozygous hemoglobin variants (HbS, HgC, etc)do  not significantly interfere with this assay.   However, presence of multiple variants may affect accuracy.         TSH  Recent Labs   Lab 08/12/21  1010   TSH 1.557         The 10-year ASCVD risk score (Johnna WELCH, et al., 2019) is: 56.5%    Values  used to calculate the score:      Age: 77 years      Sex: Female      Is Non- : No      Diabetic: Yes      Tobacco smoker: No      Systolic Blood Pressure: 138 mmHg      Is BP treated: Yes      HDL Cholesterol: 46 mg/dL      Total Cholesterol: 142 mg/dL             ASSESSMENT AND PLAN     Patient Active Problem List   Diagnosis    Mixed hyperlipidemia; 7/2018 prava to atorva    Essential hypertension    Type 2 diabetes mellitus without complication, without long-term current use of insulin; januvia expensive;1/2020 actos    H/O: stroke with residual effects    Diabetes mellitus type 2 without retinopathy    Nuclear sclerosis of both eyes    Refractive errors    Nonrheumatic aortic valve stenosis 6/2018 TTE trivial to mild AoS    RLS (restless legs syndrome)    Microcytic anemia mentzer index < 13, suspect this is underlying thalassemia. normal ferritin    Calculus of gallbladder without cholecystitis without obstruction incidental on RUQ US 1/2021    Elevated alkaline phosphatase level, liver fraction, 1/2021 RUQ US normal except incidental gallstones     Aortic stenosis, moderate to severe.  Asymptomatic at the current time.  Recheck echo in 6 months followed by clinic visit.    Stress test did not show any significant ischemia    Follow-up after 6 months            Thank you very much for involving me in the care of your patient.  Please do not hesitate to contact me if there are any questions.      Roxanna Mcpherson MD, FACC, [facility]  Interventional Cardiologist, [facility].           This note was dictated with the help of speech recognition software.  There might be un-intended errors and/or substitutions.

## 2023-04-04 ENCOUNTER — PES CALL (OUTPATIENT)
Dept: ADMINISTRATIVE | Facility: CLINIC | Age: 78
End: 2023-04-04
Payer: MEDICARE

## 2023-04-27 ENCOUNTER — PES CALL (OUTPATIENT)
Dept: ADMINISTRATIVE | Facility: CLINIC | Age: 78
End: 2023-04-27
Payer: MEDICARE

## 2023-05-14 DIAGNOSIS — E11.9 TYPE 2 DIABETES MELLITUS WITHOUT COMPLICATION, WITHOUT LONG-TERM CURRENT USE OF INSULIN: ICD-10-CM

## 2023-05-14 RX ORDER — METFORMIN HYDROCHLORIDE 500 MG/1
TABLET, EXTENDED RELEASE ORAL
Qty: 360 TABLET | Refills: 0 | Status: SHIPPED | OUTPATIENT
Start: 2023-05-14 | End: 2023-09-28 | Stop reason: SDUPTHER

## 2023-05-14 NOTE — TELEPHONE ENCOUNTER
No care due was identified.  [employer] Embedded Care Due Messages. Reference number: [id number].   5/14/2023 2:38:52 PM CDT

## 2023-05-15 NOTE — TELEPHONE ENCOUNTER
Refill Decision Note   Ramya Mayo  is requesting a refill authorization.  Brief Assessment and Rationale for Refill:  Approve     Medication Therapy Plan:         Comments:     Note composed:9:51 PM 05/14/2023             Appointments     Last Visit   12/19/2022 Kameron Donato MD   Next Visit   6/19/2023 Kameron Donato MD

## 2023-05-24 ENCOUNTER — OFFICE VISIT (OUTPATIENT)
Dept: OPTOMETRY | Facility: CLINIC | Age: 78
End: 2023-05-24
Payer: MEDICARE

## 2023-05-24 DIAGNOSIS — E11.36 TYPE 2 DIABETES MELLITUS WITH DIABETIC CATARACT, WITHOUT LONG-TERM CURRENT USE OF INSULIN: Primary | ICD-10-CM

## 2023-05-24 DIAGNOSIS — H25.13 NUCLEAR SCLEROSIS OF BOTH EYES: ICD-10-CM

## 2023-05-24 DIAGNOSIS — H52.7 REFRACTIVE ERROR: ICD-10-CM

## 2023-05-24 DIAGNOSIS — I69.30 H/O: STROKE WITH RESIDUAL EFFECTS: ICD-10-CM

## 2023-05-24 PROCEDURE — 92014 COMPRE OPH EXAM EST PT 1/>: CPT | Mod: S$GLB,,, | Performed by: OPTOMETRIST

## 2023-05-24 PROCEDURE — 1160F PR REVIEW ALL MEDS BY PRESCRIBER/CLIN PHARMACIST DOCUMENTED: ICD-10-PCS | Mod: CPTII,S$GLB,, | Performed by: OPTOMETRIST

## 2023-05-24 PROCEDURE — 3288F FALL RISK ASSESSMENT DOCD: CPT | Mod: CPTII,S$GLB,, | Performed by: OPTOMETRIST

## 2023-05-24 PROCEDURE — 92015 DETERMINE REFRACTIVE STATE: CPT | Mod: S$GLB,,, | Performed by: OPTOMETRIST

## 2023-05-24 PROCEDURE — 1159F PR MEDICATION LIST DOCUMENTED IN MEDICAL RECORD: ICD-10-PCS | Mod: CPTII,S$GLB,, | Performed by: OPTOMETRIST

## 2023-05-24 PROCEDURE — 1126F PR PAIN SEVERITY QUANTIFIED, NO PAIN PRESENT: ICD-10-PCS | Mod: CPTII,S$GLB,, | Performed by: OPTOMETRIST

## 2023-05-24 PROCEDURE — 92015 PR REFRACTION: ICD-10-PCS | Mod: S$GLB,,, | Performed by: OPTOMETRIST

## 2023-05-24 PROCEDURE — 1126F AMNT PAIN NOTED NONE PRSNT: CPT | Mod: CPTII,S$GLB,, | Performed by: OPTOMETRIST

## 2023-05-24 PROCEDURE — 3288F PR FALLS RISK ASSESSMENT DOCUMENTED: ICD-10-PCS | Mod: CPTII,S$GLB,, | Performed by: OPTOMETRIST

## 2023-05-24 PROCEDURE — 99999 PR PBB SHADOW E&M-EST. PATIENT-LVL III: CPT | Mod: PBBFAC,,, | Performed by: OPTOMETRIST

## 2023-05-24 PROCEDURE — 2023F DILAT RTA XM W/O RTNOPTHY: CPT | Mod: CPTII,S$GLB,, | Performed by: OPTOMETRIST

## 2023-05-24 PROCEDURE — 1101F PR PT FALLS ASSESS DOC 0-1 FALLS W/OUT INJ PAST YR: ICD-10-PCS | Mod: CPTII,S$GLB,, | Performed by: OPTOMETRIST

## 2023-05-24 PROCEDURE — 1160F RVW MEDS BY RX/DR IN RCRD: CPT | Mod: CPTII,S$GLB,, | Performed by: OPTOMETRIST

## 2023-05-24 PROCEDURE — 1101F PT FALLS ASSESS-DOCD LE1/YR: CPT | Mod: CPTII,S$GLB,, | Performed by: OPTOMETRIST

## 2023-05-24 PROCEDURE — 99999 PR PBB SHADOW E&M-EST. PATIENT-LVL III: ICD-10-PCS | Mod: PBBFAC,,, | Performed by: OPTOMETRIST

## 2023-05-24 PROCEDURE — 92014 PR EYE EXAM, EST PATIENT,COMPREHESV: ICD-10-PCS | Mod: S$GLB,,, | Performed by: OPTOMETRIST

## 2023-05-24 PROCEDURE — 1159F MED LIST DOCD IN RCRD: CPT | Mod: CPTII,S$GLB,, | Performed by: OPTOMETRIST

## 2023-05-24 PROCEDURE — 2023F PR DILATED RETINAL EXAM W/O EVID OF RETINOPATHY: ICD-10-PCS | Mod: CPTII,S$GLB,, | Performed by: OPTOMETRIST

## 2023-05-24 NOTE — PROGRESS NOTES
Subjective:       Patient ID: Ramya Mayo is a 77 y.o. female      Chief Complaint   Patient presents with    Concerns About Ocular Health    Diabetic Eye Exam     History of Present Illness  Dls: 4/6/21 Dr. Baltazar     78 y/o female presents today for diabetic eye exam.   Pt states no changes in vision. Pt wears bifocal glasses.      today     + ou tearing  + ou itching  No burning  No pain  No ha's  No floaters  No flashes    Eye meds  Otc gtts ou prn     Hemoglobin A1C       Date                     Value               Ref Range             Status                12/12/2022               7.2 (H)             4.0 - 5.6 %           Final                  09/21/2022               9.5 (H)             4.0 - 5.6 %           Final                  05/17/2022               7.8 (H)             4.0 - 5.6 %           Final                Assessment/Plan:     1. Type 2 diabetes mellitus with diabetic cataract, without long-term current use of insulin  No diabetic retinopathy. Discussed with pt the effects of diabetes on vision, importance of good blood sugar control, compliance with meds, and follow up care with PCP. Return in 1 year for dilated eye exam, sooner PRN.    2. Nuclear sclerosis of both eyes  NVS per pt. Educated pt on presence of cataracts and effects on vision. No surgery at this time. Recheck in one year, sooner PRN.      3. RH/O: stroke with residual effects  With VA loss OS. Stable. Monocular precautions.    4. Refractive errors  Educated patient on refractive error and discussed lens options. Dispensed updated spectacle Rx. Educated about adaptation period to new specs.    Eyeglass Final Rx       Eyeglass Final Rx         Sphere Cylinder Axis Add    Right Balance       Left -3.00 +1.00 180 +2.75      Expiration Date: 5/24/2024                      Follow up in about 1 year (around 5/24/2024).

## 2023-06-12 ENCOUNTER — LAB VISIT (OUTPATIENT)
Dept: LAB | Facility: HOSPITAL | Age: 78
End: 2023-06-12
Attending: INTERNAL MEDICINE
Payer: MEDICARE

## 2023-06-12 DIAGNOSIS — E11.9 TYPE 2 DIABETES MELLITUS WITHOUT COMPLICATION, WITHOUT LONG-TERM CURRENT USE OF INSULIN: ICD-10-CM

## 2023-06-12 LAB
ALBUMIN SERPL BCP-MCNC: 4.2 G/DL (ref 3.5–5.2)
ALP SERPL-CCNC: 115 U/L (ref 55–135)
ALT SERPL W/O P-5'-P-CCNC: 27 U/L (ref 10–44)
ANION GAP SERPL CALC-SCNC: 11 MMOL/L (ref 8–16)
AST SERPL-CCNC: 30 U/L (ref 10–40)
BASOPHILS # BLD AUTO: 0.09 K/UL (ref 0–0.2)
BASOPHILS NFR BLD: 0.9 % (ref 0–1.9)
BILIRUB SERPL-MCNC: 1 MG/DL (ref 0.1–1)
BUN SERPL-MCNC: 10 MG/DL (ref 8–23)
CALCIUM SERPL-MCNC: 10.1 MG/DL (ref 8.7–10.5)
CHLORIDE SERPL-SCNC: 103 MMOL/L (ref 95–110)
CHOLEST SERPL-MCNC: 153 MG/DL (ref 120–199)
CHOLEST/HDLC SERPL: 3.1 {RATIO} (ref 2–5)
CO2 SERPL-SCNC: 26 MMOL/L (ref 23–29)
CREAT SERPL-MCNC: 0.9 MG/DL (ref 0.5–1.4)
DIFFERENTIAL METHOD: ABNORMAL
EOSINOPHIL # BLD AUTO: 0.2 K/UL (ref 0–0.5)
EOSINOPHIL NFR BLD: 1.9 % (ref 0–8)
ERYTHROCYTE [DISTWIDTH] IN BLOOD BY AUTOMATED COUNT: 16.2 % (ref 11.5–14.5)
EST. GFR  (NO RACE VARIABLE): >60 ML/MIN/1.73 M^2
ESTIMATED AVG GLUCOSE: 194 MG/DL (ref 68–131)
GLUCOSE SERPL-MCNC: 200 MG/DL (ref 70–110)
HBA1C MFR BLD: 8.4 % (ref 4–5.6)
HCT VFR BLD AUTO: 41.9 % (ref 37–48.5)
HDLC SERPL-MCNC: 50 MG/DL (ref 40–75)
HDLC SERPL: 32.7 % (ref 20–50)
HGB BLD-MCNC: 12.1 G/DL (ref 12–16)
IMM GRANULOCYTES # BLD AUTO: 0.02 K/UL (ref 0–0.04)
IMM GRANULOCYTES NFR BLD AUTO: 0.2 % (ref 0–0.5)
LDLC SERPL CALC-MCNC: 58 MG/DL (ref 63–159)
LYMPHOCYTES # BLD AUTO: 4 K/UL (ref 1–4.8)
LYMPHOCYTES NFR BLD: 40 % (ref 18–48)
MCH RBC QN AUTO: 20.1 PG (ref 27–31)
MCHC RBC AUTO-ENTMCNC: 28.9 G/DL (ref 32–36)
MCV RBC AUTO: 70 FL (ref 82–98)
MONOCYTES # BLD AUTO: 0.7 K/UL (ref 0.3–1)
MONOCYTES NFR BLD: 6.8 % (ref 4–15)
NEUTROPHILS # BLD AUTO: 5 K/UL (ref 1.8–7.7)
NEUTROPHILS NFR BLD: 50.2 % (ref 38–73)
NONHDLC SERPL-MCNC: 103 MG/DL
NRBC BLD-RTO: 0 /100 WBC
PLATELET # BLD AUTO: 298 K/UL (ref 150–450)
PMV BLD AUTO: 10.8 FL (ref 9.2–12.9)
POTASSIUM SERPL-SCNC: 4.3 MMOL/L (ref 3.5–5.1)
PROT SERPL-MCNC: 8.2 G/DL (ref 6–8.4)
RBC # BLD AUTO: 6.03 M/UL (ref 4–5.4)
SODIUM SERPL-SCNC: 140 MMOL/L (ref 136–145)
TRIGL SERPL-MCNC: 225 MG/DL (ref 30–150)
WBC # BLD AUTO: 9.9 K/UL (ref 3.9–12.7)

## 2023-06-12 PROCEDURE — 36415 COLL VENOUS BLD VENIPUNCTURE: CPT | Mod: PO | Performed by: INTERNAL MEDICINE

## 2023-06-12 PROCEDURE — 80061 LIPID PANEL: CPT | Performed by: INTERNAL MEDICINE

## 2023-06-12 PROCEDURE — 85025 COMPLETE CBC W/AUTO DIFF WBC: CPT | Performed by: INTERNAL MEDICINE

## 2023-06-12 PROCEDURE — 80053 COMPREHEN METABOLIC PANEL: CPT | Performed by: INTERNAL MEDICINE

## 2023-06-12 PROCEDURE — 83036 HEMOGLOBIN GLYCOSYLATED A1C: CPT | Performed by: INTERNAL MEDICINE

## 2023-06-18 NOTE — PROGRESS NOTES
This note was created by combination of typed  and M-Modal dictation.  Transcription errors may be present.  If there are any questions, please contact me.    Assessment and Plan:   Assessment and Plan    Diabetes mellitus type 2 without retinopathy  Type 2 diabetes mellitus with diabetic peripheral angiopathy without gangrene, unspecified whether long term insulin use  Type 2 diabetes mellitus without complication, without long-term current use of insulin; roseanneuvia expensive;1/2020 actos  -  A1c went up.  History of Rybelsus but was not able to dose consistently because of issues with the donut hole.  So I started her on pioglitazone.    Pre visit labs A1c high.    Cost is an issue   Stay on glipizide stay on metformin and increase pioglitazone to 30  -     Comprehensive Metabolic Panel; Future; Expected date: 09/18/2023  -     Hemoglobin A1C; Future; Expected date: 09/18/2023  -     pioglitazone (ACTOS) 30 MG tablet; Take 1 tablet (30 mg total) by mouth once daily.  Dispense: 90 tablet; Refill: 1  -     glipiZIDE (GLUCOTROL) 10 MG TR24; Take 1 tablet (10 mg total) by mouth daily with breakfast.  Dispense: 90 tablet; Refill: 3  -     pioglitazone (ACTOS) 30 MG tablet; Take 1 tablet (30 mg total) by mouth once daily.  Dispense: 90 tablet; Refill: 1    Essential hypertension  - blood pressure today borderline.  Notes salt intake, advised to cut down.    Dyslipidemia  -  Pre visit labs triglycerides high but non HDL is good.  No change   In statin dose    Nonrheumatic aortic valve stenosis mod severe monitor q6 months  -  seen by Cardiology,  moderate to severe aortic stenosis.  Asymptomatic.  Plan is routine follow-up   Monitoring with echo.    Hair loss  Iron deficiency  - RLS, last ferritin check less than 50.  Is not taking dedicated iron supplement.  I will send in a prescription.  Take it for 3 months.  Check future ferritin and CBC.  Last TSH 2021 was normal.  Hold on repeat.  Denies any recent  illness.  No longer taking ropinirole.  Takes aleve instead.  -     Ferritin; Future; Expected date: 2023  -     ferrous sulfate 325 (65 FE) MG EC tablet; Take 1 tablet (325 mg total) by mouth once daily.  Dispense: 90 tablet; Refill: 0  -     CBC Without Differential; Future; Expected date: 2023       Medications Discontinued During This Encounter   Medication Reason    rOPINIRole (REQUIP) 1 MG tablet Alternate therapy    glipiZIDE (GLUCOTROL) 10 MG TR24 Reorder    pioglitazone (ACTOS) 15 MG tablet     multivit-min-ferrous gluconate 9 mg iron/15 mL oral liquid Therapy completed       meds sent this encounter:  Medications Ordered This Encounter   Medications    ferrous sulfate 325 (65 FE) MG EC tablet     Sig: Take 1 tablet (325 mg total) by mouth once daily.     Dispense:  90 tablet     Refill:  0    glipiZIDE (GLUCOTROL) 10 MG TR24     Sig: Take 1 tablet (10 mg total) by mouth daily with breakfast.     Dispense:  90 tablet     Refill:  3     Keep this one on file, not requesting refills today    pioglitazone (ACTOS) 30 MG tablet     Sig: Take 1 tablet (30 mg total) by mouth once daily.     Dispense:  90 tablet     Refill:  1     Higher dose         Follow Up: OV 3 months on higher dose pioglitazone  Future Appointments   Date Time Provider Department Center   2023  2:20 PM Kameron Donato MD [facility] FAM MED [place]   2023 10:30 AM ECHO, [facility] WB EKG [facility]   2023 10:40 AM Roxanna Mcpherson MD [facility] CARDIO [place]         Subjective:   Subjective   Chief Complaint   Patient presents with    Follow-up    discuss labs        HPI  Ramya is a 77 y.o. female.    Social History     Tobacco Use    Smoking status: Never    Smokeless tobacco: Never   Substance Use Topics    Alcohol use: No          Social History     Social History Narrative    Lives in [place].    2014.         No LMP recorded. Patient has had a hysterectomy.    Last appointment with this clinic  was Visit date not found. Last visit with me 12/19/2022   To summarize last visit and events leading up to today:  Diabetes.  Rybelsus unaffordable with donut hole.  She did not want to have to deal with this every year so we ended up stopping it and kept her on metformin glipizide and pioglitazone.  Hypertension, hyperlipidemia  Aortic valve stenosis, 2018 echo mild.  Referred to Cardiology.    Subsequently seen by Cardiology   01/11/2023 TTE LV normal size and systolic function LVEF 65%.  Grade 1 diastolic dysfunction.  Moderate to severe aortic stenosis.  01/11/2023 nuclear stress test negative for ischemia  Seen in follow-up.  Asymptomatic and plan is repeat echo in 6 months  Microcytic anemia suspicious for thalassemia.  Normal ferritin  RLS, ropinirole  History of adjustment disorder with trazodone.    03/08/2022 DEXA normal BMD.    Pre visit labs  CBC microcytosis stable   CMP hyperglycemia  Lipid triglycerides high non    A1c 8.4 from 7.2 from 9.5  Urine microalbumin normal    Increase Actos to 30    Today's visit:  Please note: Chronic medical problems that are stable but are being addressed at this visit may not be listed/documented here, but may be addressed in more detail in the Assessment and Plan section.   Below are salient features of chronic medical conditions, or new/acute conditions and their details.    Notes hair is falling out.    Seemed to notice around the time she had her echo. So that was around January.    Makes her feel bad  Denies significant stress. No recent illness.   Just the top of her head.      Stopped the requip. Instead she is taking aleve and that seems to work fine.      She sees an rx on med list for liquid MVI with iron.  Looks like it has been on her med list longstanding but she thought it was rx'd and notes that no pharmacy will fill her request.  Discussed that this was listed as med history and she does not have an rx for this. Recommend iron daily x 3 months to  get ferritin > 50 and see if that helps with RLS and also possibly with hair loss.     Denies CP, no dyspnea  Home BP readings - estimates weekly. By recall 130/60s. Today was borderline.    Adds salt to her foods.   BP high on intake, borderline on repeat.      Not interested in covid vaccine  Is equivocal about shingrix    Patient Care Team:  Kameron Donato MD as PCP - General (Internal Medicine)  Melina Parmar MA as Care Coordinator  Tessa Baltazar OD as Consulting Physician (Optometry)      Patient Active Problem List    Diagnosis Date Noted    Type 2 diabetes mellitus with diabetic peripheral angiopathy without gangrene, unspecified whether long term insulin use 03/01/2023    Calculus of gallbladder without cholecystitis without obstruction incidental on RUQ US 1/2021 01/22/2021 1/22/2021 RUQ US: Cholelithiasis.  No gallbladder wall thickening or pericholecystic fluid.  Negative sonographic Taylor sign.      Elevated alkaline phosphatase level, liver fraction, 1/2021 RUQ US normal except incidental gallstones 01/22/2021    Microcytic anemia mentzer index < 13, suspect this is underlying thalassemia. normal ferritin 04/09/2019    RLS (restless legs syndrome) 07/05/2018    Nonrheumatic aortic valve stenosis mod severe monitor q6 months 06/06/2018 01/11/2023 TTE LV normal size and systolic function LVEF 65%.  Grade 1 diastolic dysfunction.  Moderate to severe aortic stenosis.  01/11/2023 nuclear stress test negative for ischemia      Nuclear sclerosis of both eyes 05/11/2018    Refractive error 05/11/2018    Diabetes mellitus type 2 without retinopathy 09/20/2016    Dyslipidemia 7/2018 changed prava to atorva 09/18/2012    Essential hypertension 09/18/2012    Type 2 diabetes mellitus without complication, without long-term current use of insulin; januvia expensive;1/2020 actos 09/18/2012    H/O: stroke with residual effects 09/18/2012       PAST MEDICAL PROBLEMS, PAST SURGICAL HISTORY: please see  "relevant portions of the electronic medical record    ALLERGIES AND MEDICATIONS: updated and reviewed.  Medication List with Changes/Refills   Current Medications    AMLODIPINE (NORVASC) 10 MG TABLET    Take 1 tablet by mouth once daily    ASPIRIN (ECOTRIN) 81 MG EC TABLET    Take 1 tablet (81 mg total) by mouth once daily.    ATORVASTATIN (LIPITOR) 40 MG TABLET    Take 1 tablet by mouth once daily    BLOOD SUGAR DIAGNOSTIC (TRUE METRIX GLUCOSE TEST STRIP) STRP    Daily glucose testing; whichever brand covered by insurance.    GLIPIZIDE (GLUCOTROL) 10 MG TR24    Take 1 tablet (10 mg total) by mouth daily with breakfast.    LANCETS (ONETOUCH DELICA LANCETS) 33 GAUGE MISC    1 lancet by Misc.(Non-Drug; Combo Route) route once daily. ONCE DAILY BLOOD SUGAR TESTING; WHICHEVER BRAND COVERED BY INSURANCE    LOSARTAN (COZAAR) 100 MG TABLET    Take 1 tablet (100 mg total) by mouth once daily.    METFORMIN (GLUCOPHAGE-XR) 500 MG ER 24HR TABLET    TAKE 2 TABLETS BY MOUTH TWICE DAILY WITH  MEALS    METOPROLOL SUCCINATE (TOPROL-XL) 100 MG 24 HR TABLET    Take 1 tablet (100 mg total) by mouth once daily.    MONTELUKAST (SINGULAIR) 10 MG TABLET    Take 10 mg by mouth every evening.    MULTIVIT-MIN-FERROUS GLUCONATE 9 MG IRON/15 ML ORAL LIQUID    Take by mouth. 1 Liquid Oral Every day    PIOGLITAZONE (ACTOS) 15 MG TABLET    Take 1 tablet (15 mg total) by mouth once daily.    ROPINIROLE (REQUIP) 1 MG TABLET    Take 1 tablet (1 mg total) by mouth 2 (two) times daily as needed (restless legs).    TRAZODONE (DESYREL) 50 MG TABLET    Take 0.5 tablets (25 mg total) by mouth nightly as needed for Insomnia.         Objective:   Objective   Physical Exam   Vitals:    06/19/23 1353 06/19/23 1431   BP: (!) 144/60 138/60   BP Location:  Left arm   Patient Position:  Sitting   BP Method:  Medium (Manual)   Pulse: 85    Temp: 98 °F (36.7 °C)    TempSrc: Oral    SpO2: 97%    Height: 5' 3" (1.6 m)     Body mass index is 26.75 kg/m².      Height: " "5' 3" (160 cm)     Physical Exam  Constitutional:       General: She is not in acute distress.     Appearance: She is well-developed.   Eyes:      Extraocular Movements: Extraocular movements intact.   Cardiovascular:      Rate and Rhythm: Normal rate and regular rhythm.      Heart sounds: Normal heart sounds. No murmur heard.  Pulmonary:      Effort: Pulmonary effort is normal.      Breath sounds: Normal breath sounds.   Musculoskeletal:         General: Normal range of motion.      Right lower leg: No edema.      Left lower leg: No edema.   Skin:     General: Skin is warm and dry.   Neurological:      Mental Status: She is alert and oriented to person, place, and time.      Coordination: Coordination normal.   Psychiatric:         Behavior: Behavior normal.         Thought Content: Thought content normal.              "

## 2023-06-19 ENCOUNTER — OFFICE VISIT (OUTPATIENT)
Dept: FAMILY MEDICINE | Facility: CLINIC | Age: 78
End: 2023-06-19
Payer: MEDICARE

## 2023-06-19 VITALS
TEMPERATURE: 98 F | BODY MASS INDEX: 26.75 KG/M2 | HEIGHT: 63 IN | HEART RATE: 85 BPM | SYSTOLIC BLOOD PRESSURE: 138 MMHG | OXYGEN SATURATION: 97 % | DIASTOLIC BLOOD PRESSURE: 60 MMHG

## 2023-06-19 DIAGNOSIS — E11.9 DIABETES MELLITUS TYPE 2 WITHOUT RETINOPATHY: Primary | ICD-10-CM

## 2023-06-19 DIAGNOSIS — E11.51 TYPE 2 DIABETES MELLITUS WITH DIABETIC PERIPHERAL ANGIOPATHY WITHOUT GANGRENE, UNSPECIFIED WHETHER LONG TERM INSULIN USE: ICD-10-CM

## 2023-06-19 DIAGNOSIS — L65.9 HAIR LOSS: ICD-10-CM

## 2023-06-19 DIAGNOSIS — I35.0 NONRHEUMATIC AORTIC VALVE STENOSIS: ICD-10-CM

## 2023-06-19 DIAGNOSIS — E61.1 IRON DEFICIENCY: ICD-10-CM

## 2023-06-19 DIAGNOSIS — I10 ESSENTIAL HYPERTENSION: ICD-10-CM

## 2023-06-19 DIAGNOSIS — E11.9 TYPE 2 DIABETES MELLITUS WITHOUT COMPLICATION, WITHOUT LONG-TERM CURRENT USE OF INSULIN: ICD-10-CM

## 2023-06-19 DIAGNOSIS — E78.5 DYSLIPIDEMIA: ICD-10-CM

## 2023-06-19 PROCEDURE — 3075F SYST BP GE 130 - 139MM HG: CPT | Mod: CPTII,S$GLB,, | Performed by: INTERNAL MEDICINE

## 2023-06-19 PROCEDURE — 1126F AMNT PAIN NOTED NONE PRSNT: CPT | Mod: CPTII,S$GLB,, | Performed by: INTERNAL MEDICINE

## 2023-06-19 PROCEDURE — 99999 PR PBB SHADOW E&M-EST. PATIENT-LVL III: CPT | Mod: PBBFAC,,, | Performed by: INTERNAL MEDICINE

## 2023-06-19 PROCEDURE — 1101F PR PT FALLS ASSESS DOC 0-1 FALLS W/OUT INJ PAST YR: ICD-10-PCS | Mod: CPTII,S$GLB,, | Performed by: INTERNAL MEDICINE

## 2023-06-19 PROCEDURE — 3288F FALL RISK ASSESSMENT DOCD: CPT | Mod: CPTII,S$GLB,, | Performed by: INTERNAL MEDICINE

## 2023-06-19 PROCEDURE — 1101F PT FALLS ASSESS-DOCD LE1/YR: CPT | Mod: CPTII,S$GLB,, | Performed by: INTERNAL MEDICINE

## 2023-06-19 PROCEDURE — 3078F PR MOST RECENT DIASTOLIC BLOOD PRESSURE < 80 MM HG: ICD-10-PCS | Mod: CPTII,S$GLB,, | Performed by: INTERNAL MEDICINE

## 2023-06-19 PROCEDURE — 3288F PR FALLS RISK ASSESSMENT DOCUMENTED: ICD-10-PCS | Mod: CPTII,S$GLB,, | Performed by: INTERNAL MEDICINE

## 2023-06-19 PROCEDURE — 1159F MED LIST DOCD IN RCRD: CPT | Mod: CPTII,S$GLB,, | Performed by: INTERNAL MEDICINE

## 2023-06-19 PROCEDURE — 1160F RVW MEDS BY RX/DR IN RCRD: CPT | Mod: CPTII,S$GLB,, | Performed by: INTERNAL MEDICINE

## 2023-06-19 PROCEDURE — 3075F PR MOST RECENT SYSTOLIC BLOOD PRESS GE 130-139MM HG: ICD-10-PCS | Mod: CPTII,S$GLB,, | Performed by: INTERNAL MEDICINE

## 2023-06-19 PROCEDURE — 3078F DIAST BP <80 MM HG: CPT | Mod: CPTII,S$GLB,, | Performed by: INTERNAL MEDICINE

## 2023-06-19 PROCEDURE — 99214 PR OFFICE/OUTPT VISIT, EST, LEVL IV, 30-39 MIN: ICD-10-PCS | Mod: S$GLB,,, | Performed by: INTERNAL MEDICINE

## 2023-06-19 PROCEDURE — 1159F PR MEDICATION LIST DOCUMENTED IN MEDICAL RECORD: ICD-10-PCS | Mod: CPTII,S$GLB,, | Performed by: INTERNAL MEDICINE

## 2023-06-19 PROCEDURE — 99999 PR PBB SHADOW E&M-EST. PATIENT-LVL III: ICD-10-PCS | Mod: PBBFAC,,, | Performed by: INTERNAL MEDICINE

## 2023-06-19 PROCEDURE — 1126F PR PAIN SEVERITY QUANTIFIED, NO PAIN PRESENT: ICD-10-PCS | Mod: CPTII,S$GLB,, | Performed by: INTERNAL MEDICINE

## 2023-06-19 PROCEDURE — 1160F PR REVIEW ALL MEDS BY PRESCRIBER/CLIN PHARMACIST DOCUMENTED: ICD-10-PCS | Mod: CPTII,S$GLB,, | Performed by: INTERNAL MEDICINE

## 2023-06-19 PROCEDURE — 99214 OFFICE O/P EST MOD 30 MIN: CPT | Mod: S$GLB,,, | Performed by: INTERNAL MEDICINE

## 2023-06-19 RX ORDER — PIOGLITAZONEHYDROCHLORIDE 30 MG/1
30 TABLET ORAL DAILY
Qty: 90 TABLET | Refills: 1 | Status: SHIPPED | OUTPATIENT
Start: 2023-06-19 | End: 2024-01-04

## 2023-06-19 RX ORDER — FERROUS SULFATE 325(65) MG
325 TABLET, DELAYED RELEASE (ENTERIC COATED) ORAL DAILY
Qty: 90 TABLET | Refills: 0 | Status: SHIPPED | OUTPATIENT
Start: 2023-06-19 | End: 2023-09-28 | Stop reason: ALTCHOICE

## 2023-06-19 RX ORDER — GLIPIZIDE 10 MG/1
10 TABLET, FILM COATED, EXTENDED RELEASE ORAL
Qty: 90 TABLET | Refills: 3 | Status: SHIPPED | OUTPATIENT
Start: 2023-06-19 | End: 2024-06-18

## 2023-06-22 ENCOUNTER — PATIENT OUTREACH (OUTPATIENT)
Dept: ADMINISTRATIVE | Facility: HOSPITAL | Age: 78
End: 2023-06-22
Payer: MEDICARE

## 2023-06-29 ENCOUNTER — PES CALL (OUTPATIENT)
Dept: ADMINISTRATIVE | Facility: CLINIC | Age: 78
End: 2023-06-29
Payer: MEDICARE

## 2023-07-17 DIAGNOSIS — I10 ESSENTIAL HYPERTENSION: ICD-10-CM

## 2023-07-17 RX ORDER — LOSARTAN POTASSIUM 100 MG/1
TABLET ORAL
Qty: 90 TABLET | Refills: 3 | Status: SHIPPED | OUTPATIENT
Start: 2023-07-17

## 2023-07-17 NOTE — TELEPHONE ENCOUNTER
No care due was identified.  [facility] Embedded Care Due Messages. Reference number: [id number].   7/17/2023 6:20:42 PM CDT

## 2023-07-18 NOTE — TELEPHONE ENCOUNTER
Refill Decision Note   Ramya Mayo  is requesting a refill authorization.  Brief Assessment and Rationale for Refill:  Approve     Medication Therapy Plan:         Comments:     Note composed:7:11 PM 07/17/2023

## 2023-07-31 DIAGNOSIS — E78.2 MIXED HYPERLIPIDEMIA: ICD-10-CM

## 2023-07-31 DIAGNOSIS — I10 ESSENTIAL HYPERTENSION: ICD-10-CM

## 2023-07-31 RX ORDER — ATORVASTATIN CALCIUM 40 MG/1
TABLET, FILM COATED ORAL
Qty: 90 TABLET | Refills: 3 | Status: SHIPPED | OUTPATIENT
Start: 2023-07-31

## 2023-07-31 NOTE — TELEPHONE ENCOUNTER
Refill Decision Note   Ramya Mayo  is requesting a refill authorization.  Brief Assessment and Rationale for Refill:  Approve     Medication Therapy Plan:         Comments:     Note composed:1:14 PM 07/31/2023

## 2023-07-31 NOTE — TELEPHONE ENCOUNTER
No care due was identified.  [facility] Embedded Care Due Messages. Reference number: [id number].   7/31/2023 1:01:56 PM CDT

## 2023-07-31 NOTE — TELEPHONE ENCOUNTER
No care due was identified.  [facility] Embedded Care Due Messages. Reference number: [id number].   7/31/2023 1:05:31 PM CDT

## 2023-08-01 RX ORDER — METOPROLOL SUCCINATE 100 MG/1
100 TABLET, EXTENDED RELEASE ORAL
Qty: 90 TABLET | Refills: 3 | Status: SHIPPED | OUTPATIENT
Start: 2023-08-01

## 2023-08-01 NOTE — TELEPHONE ENCOUNTER
Refill Routing Note   Medication(s) are not appropriate for processing by [facility] for the following reason(s):      Drug-disease interaction    [facility] action(s):  Defer None identified   Medication Therapy Plan: Drug-Disease: metoprolol succinate and Type 2 diabetes mellitus with diabetic peripheral angiopathy without gangrene, unspecified whether long term insulin use      Appointments  past 12m or future 3m with PCP    Date Provider   Last Visit   6/19/2023 Kameron Donato MD   Next Visit   9/28/2023 Kameron Donato MD   ED visits in past 90 days: 0        Note composed:9:15 PM 07/31/2023

## 2023-09-21 ENCOUNTER — LAB VISIT (OUTPATIENT)
Dept: LAB | Facility: HOSPITAL | Age: 78
End: 2023-09-21
Attending: INTERNAL MEDICINE
Payer: MEDICARE

## 2023-09-21 DIAGNOSIS — E11.9 DIABETES MELLITUS TYPE 2 WITHOUT RETINOPATHY: ICD-10-CM

## 2023-09-21 DIAGNOSIS — E61.1 IRON DEFICIENCY: ICD-10-CM

## 2023-09-21 DIAGNOSIS — E11.51 TYPE 2 DIABETES MELLITUS WITH DIABETIC PERIPHERAL ANGIOPATHY WITHOUT GANGRENE, UNSPECIFIED WHETHER LONG TERM INSULIN USE: ICD-10-CM

## 2023-09-21 LAB
ALBUMIN SERPL BCP-MCNC: 4 G/DL (ref 3.5–5.2)
ALP SERPL-CCNC: 93 U/L (ref 55–135)
ALT SERPL W/O P-5'-P-CCNC: 16 U/L (ref 10–44)
ANION GAP SERPL CALC-SCNC: 11 MMOL/L (ref 8–16)
AST SERPL-CCNC: 25 U/L (ref 10–40)
BILIRUB SERPL-MCNC: 0.8 MG/DL (ref 0.1–1)
BUN SERPL-MCNC: 15 MG/DL (ref 8–23)
CALCIUM SERPL-MCNC: 10 MG/DL (ref 8.7–10.5)
CHLORIDE SERPL-SCNC: 105 MMOL/L (ref 95–110)
CO2 SERPL-SCNC: 24 MMOL/L (ref 23–29)
CREAT SERPL-MCNC: 0.8 MG/DL (ref 0.5–1.4)
ERYTHROCYTE [DISTWIDTH] IN BLOOD BY AUTOMATED COUNT: 16.3 % (ref 11.5–14.5)
EST. GFR  (NO RACE VARIABLE): >60 ML/MIN/1.73 M^2
ESTIMATED AVG GLUCOSE: 166 MG/DL (ref 68–131)
FERRITIN SERPL-MCNC: 55 NG/ML (ref 20–300)
GLUCOSE SERPL-MCNC: 153 MG/DL (ref 70–110)
HBA1C MFR BLD: 7.4 % (ref 4–5.6)
HCT VFR BLD AUTO: 40.2 % (ref 37–48.5)
HGB BLD-MCNC: 12 G/DL (ref 12–16)
MCH RBC QN AUTO: 21.2 PG (ref 27–31)
MCHC RBC AUTO-ENTMCNC: 29.9 G/DL (ref 32–36)
MCV RBC AUTO: 71 FL (ref 82–98)
PLATELET # BLD AUTO: 282 K/UL (ref 150–450)
PMV BLD AUTO: 10.6 FL (ref 9.2–12.9)
POTASSIUM SERPL-SCNC: 4.7 MMOL/L (ref 3.5–5.1)
PROT SERPL-MCNC: 7.9 G/DL (ref 6–8.4)
RBC # BLD AUTO: 5.67 M/UL (ref 4–5.4)
SODIUM SERPL-SCNC: 140 MMOL/L (ref 136–145)
WBC # BLD AUTO: 7.85 K/UL (ref 3.9–12.7)

## 2023-09-21 PROCEDURE — 85027 COMPLETE CBC AUTOMATED: CPT | Performed by: INTERNAL MEDICINE

## 2023-09-21 PROCEDURE — 80053 COMPREHEN METABOLIC PANEL: CPT | Performed by: INTERNAL MEDICINE

## 2023-09-21 PROCEDURE — 36415 COLL VENOUS BLD VENIPUNCTURE: CPT | Mod: PO | Performed by: INTERNAL MEDICINE

## 2023-09-21 PROCEDURE — 83036 HEMOGLOBIN GLYCOSYLATED A1C: CPT | Performed by: INTERNAL MEDICINE

## 2023-09-21 PROCEDURE — 82728 ASSAY OF FERRITIN: CPT | Performed by: INTERNAL MEDICINE

## 2023-09-27 NOTE — PROGRESS NOTES
This note was created by combination of typed  and M-Modal dictation.  Transcription errors may be present.  If there are any questions, please contact me.    Assessment and Plan:   Assessment and Plan    Type 2 diabetes mellitus without complication, without long-term current use of insulin; roseanneuvia expensive;1/2020 actos  -pre visit labs A1c much better.  On metformin, glipizide, Actos.  History of Rybelsus which was expensive when she got in the donut hole.  No changes  -     Comprehensive Metabolic Panel; Future; Expected date: 03/25/2024  -     Lipid Panel; Future; Expected date: 03/25/2024  -     Hemoglobin A1C; Future; Expected date: 03/25/2024  -     Microalbumin/Creatinine Ratio, Urine; Future; Expected date: 03/25/2024  -     CBC Without Differential; Future; Expected date: 03/27/2024  -     metFORMIN (GLUCOPHAGE-XR) 500 MG ER 24hr tablet; Take 2 tablets (1,000 mg total) by mouth 2 (two) times daily with meals.  Dispense: 360 tablet; Refill: 3    Essential hypertension  -blood pressure is stable.  On amlodipine, losartan, metoprolol  -     amLODIPine (NORVASC) 10 MG tablet; Take 1 tablet (10 mg total) by mouth once daily.  Dispense: 90 tablet; Refill: 3    Dyslipidemia 7/2018 changed prava to atorva  -last lipid profile good on statin no changes    RLS (restless legs syndrome)  Iron deficiency  -had started her on iron to see if this would help with hair loss.  She has not really notice no improvement.  Ferritin greater than 50.  Could still be lag time between iron supplementation and improvement in hair but I would stop the iron.  -     Ferritin; Future; Expected date: 03/28/2024    Aortic stenosis   -canceled her appointment with Cardiology and canceled her echo and she needs to reschedule these.    She defers influenza vaccine today.  Encouraged to get at her local pharmacy.    Medications Discontinued During This Encounter   Medication Reason    ferrous sulfate 325 (65 FE) MG EC tablet  Therapy completed    amLODIPine (NORVASC) 10 MG tablet Reorder    metFORMIN (GLUCOPHAGE-XR) 500 MG ER 24hr tablet Reorder       meds sent this encounter:  Medications Ordered This Encounter   Medications    amLODIPine (NORVASC) 10 MG tablet     Sig: Take 1 tablet (10 mg total) by mouth once daily.     Dispense:  90 tablet     Refill:  3     Please inactivate all prior scripts with same name and strength including any scripts on hold.    metFORMIN (GLUCOPHAGE-XR) 500 MG ER 24hr tablet     Sig: Take 2 tablets (1,000 mg total) by mouth 2 (two) times daily with meals.     Dispense:  360 tablet     Refill:  3         Follow Up: OV 6 months with labs. In the interim, needs to follow up with cardiology.  Future Appointments   Date Time Provider Department Center   3/25/2024  9:00 AM LAB, [facility] [place]   2024  3:40 PM Kameron Donato MD [facility]           Subjective:   Subjective   Chief Complaint   Patient presents with    Diabetes     Follow up        HPI  Ramya is a 77 y.o. female.    Social History     Tobacco Use    Smoking status: Never    Smokeless tobacco: Never   Substance Use Topics    Alcohol use: No          Social History     Social History Narrative    Lives in [place].    2014.         No LMP recorded. Patient has had a hysterectomy.    Last appointment with this clinic was 2023. Last visit with me 2023   To summarize last visit and events leading up to today:  Diabetes with peripheral artery disease.  A1c went up.  History of Rybelsus but not able to use consistent because of issues with the donut hole.  I started her on pioglitazone.  Stay on glipizide, metformin, increase pioglitazone  Hypertension borderline readings last visit   Hyperlipidemia triglycerides high, non HDL good.  Statin.    Aortic valve stenosis moderate severe, followed by Cardiology.  Plan is routine follow-up monitoring with echo  Hair loss, iron deficiency, restless legs,  last ferritin check was less than 50.  Last visit not taking dedicated iron supplement.  I sent in a prescription.  No longer taking ropinirole.  Microcytic anemia suspicious for thalassemia.      Cardiology appointment and Cardiology echo canceled    Pre visit labs  CBC microcytosis  CMP normal   A1c 7.4 from 8.4  Ferritin 55      Today's visit:    No change in her diet  She thinks that her a1c improvement is based solely on the higher dose med  Sometimes puffiness with standing long hours, improves by the following day.     Iron supplement taking   Notes no improvement in hair loss/brittle hair    Needs to reschedule her echo and cardiology appointment.  She didn't look at her AVS and didn't see that she had echo scheduled.     But overall feeling good.  No complaints.    Patient Care Team:  Kameron Donato MD as PCP - General (Internal Medicine)  Tessa Baltazar OD as Consulting Physician (Optometry)  Maximino Ye MA as Care Coordinator      Patient Active Problem List    Diagnosis Date Noted    Diabetes mellitus type 2 with peripheral artery disease 03/01/2023    Calculus of gallbladder without cholecystitis without obstruction incidental on RUQ US 1/2021 01/22/2021 1/22/2021 RUQ US: Cholelithiasis.  No gallbladder wall thickening or pericholecystic fluid.  Negative sonographic Taylor sign.      Elevated alkaline phosphatase level, liver fraction, 1/2021 RUQ US normal except incidental gallstones 01/22/2021    Microcytic anemia mentzer index < 13, suspect this is underlying thalassemia. normal ferritin 04/09/2019    RLS (restless legs syndrome) 07/05/2018    Nonrheumatic aortic valve stenosis mod severe monitor q6 months 06/06/2018 01/11/2023 TTE LV normal size and systolic function LVEF 65%.  Grade 1 diastolic dysfunction.  Moderate to severe aortic stenosis.  01/11/2023 nuclear stress test negative for ischemia      Nuclear sclerosis of both eyes 05/11/2018    Refractive error 05/11/2018    Diabetes  mellitus type 2 without retinopathy 09/20/2016    Dyslipidemia 7/2018 changed prava to atorva 09/18/2012    Essential hypertension 09/18/2012    Type 2 diabetes mellitus without complication, without long-term current use of insulin; januvia expensive;1/2020 actos 09/18/2012    H/O: stroke with residual effects 09/18/2012       PAST MEDICAL PROBLEMS, PAST SURGICAL HISTORY: please see relevant portions of the electronic medical record    ALLERGIES AND MEDICATIONS: updated and reviewed.  Medication List with Changes/Refills   Current Medications    AMLODIPINE (NORVASC) 10 MG TABLET    Take 1 tablet by mouth once daily    ASPIRIN (ECOTRIN) 81 MG EC TABLET    Take 1 tablet (81 mg total) by mouth once daily.    ATORVASTATIN (LIPITOR) 40 MG TABLET    Take 1 tablet by mouth once daily    BLOOD SUGAR DIAGNOSTIC (TRUE METRIX GLUCOSE TEST STRIP) STRP    Daily glucose testing; whichever brand covered by insurance.    FERROUS SULFATE 325 (65 FE) MG EC TABLET    Take 1 tablet (325 mg total) by mouth once daily.    GLIPIZIDE (GLUCOTROL) 10 MG TR24    Take 1 tablet (10 mg total) by mouth daily with breakfast.    LANCETS (ONETOUCH DELICA LANCETS) 33 GAUGE MISC    1 lancet by Misc.(Non-Drug; Combo Route) route once daily. ONCE DAILY BLOOD SUGAR TESTING; WHICHEVER BRAND COVERED BY INSURANCE    LOSARTAN (COZAAR) 100 MG TABLET    Take 1 tablet by mouth once daily    METFORMIN (GLUCOPHAGE-XR) 500 MG ER 24HR TABLET    TAKE 2 TABLETS BY MOUTH TWICE DAILY WITH  MEALS    METOPROLOL SUCCINATE (TOPROL-XL) 100 MG 24 HR TABLET    Take 1 tablet by mouth once daily    MONTELUKAST (SINGULAIR) 10 MG TABLET    Take 10 mg by mouth every evening.    PIOGLITAZONE (ACTOS) 30 MG TABLET    Take 1 tablet (30 mg total) by mouth once daily.    TRAZODONE (DESYREL) 50 MG TABLET    Take 0.5 tablets (25 mg total) by mouth nightly as needed for Insomnia.         Objective:   Objective   Physical Exam   Vitals:    09/28/23 1419   BP: (!) 128/58   Pulse: 73   Temp:  "97.6 °F (36.4 °C)   TempSrc: Oral   SpO2: 97%   Weight: 69.3 kg (152 lb 14.2 oz)   Height: 5' 3" (1.6 m)    Body mass index is 27.08 kg/m².  Weight: 69.3 kg (152 lb 14.2 oz)   Height: 5' 3" (160 cm)     Physical Exam  Constitutional:       Appearance: She is well-developed.   HENT:      Right Ear: Tympanic membrane, ear canal and external ear normal.      Left Ear: Tympanic membrane, ear canal and external ear normal.   Eyes:      General: No scleral icterus.     Extraocular Movements: Extraocular movements intact.      Pupils: Pupils are equal, round, and reactive to light.   Neck:      Thyroid: No thyromegaly.   Cardiovascular:      Rate and Rhythm: Normal rate and regular rhythm.      Heart sounds: Murmur heard.      Comments: 3/6 systolic murmur  Pulmonary:      Effort: Pulmonary effort is normal.      Breath sounds: Normal breath sounds. No wheezing.   Abdominal:      Palpations: Abdomen is soft. There is no hepatomegaly, splenomegaly or mass.      Tenderness: There is no abdominal tenderness.   Musculoskeletal:         General: No deformity. Normal range of motion.      Cervical back: Neck supple.   Lymphadenopathy:      Cervical: No cervical adenopathy.   Skin:     General: Skin is warm and dry.      Findings: No rash.      Comments: On exposed skin   Neurological:      Mental Status: She is alert and oriented to person, place, and time.      Deep Tendon Reflexes: Reflexes are normal and symmetric.   Psychiatric:         Behavior: Behavior normal.         Thought Content: Thought content normal.         Judgment: Judgment normal.         Component      Latest Ref Rn 6/12/2023 9/21/2023   WBC      3.90 - 12.70 K/uL 9.90  7.85    RBC      4.00 - 5.40 M/uL 6.03 (H)  5.67 (H)    Hemoglobin      12.0 - 16.0 g/dL 12.1  12.0    Hematocrit      37.0 - 48.5 % 41.9  40.2    MCV      82 - 98 fL 70 (L)  71 (L)    MCH      27.0 - 31.0 pg 20.1 (L)  21.2 (L)    MCHC      32.0 - 36.0 g/dL 28.9 (L)  29.9 (L)    RDW      11.5 " - 14.5 % 16.2 (H)  16.3 (H)    Platelets      150 - 450 K/uL 298  282    MPV      9.2 - 12.9 fL 10.8  10.6    Immature Granulocytes      0.0 - 0.5 % 0.2     Gran # (ANC)      1.8 - 7.7 K/uL 5.0     Immature Grans (Abs)      0.00 - 0.04 K/uL 0.02     Lymph #      1.0 - 4.8 K/uL 4.0     Mono #      0.3 - 1.0 K/uL 0.7     Eos #      0.0 - 0.5 K/uL 0.2     Baso #      0.00 - 0.20 K/uL 0.09     nRBC      0 /100 WBC 0     Gran %      38.0 - 73.0 % 50.2     Lymph %      18.0 - 48.0 % 40.0     Mono %      4.0 - 15.0 % 6.8     Eosinophil %      0.0 - 8.0 % 1.9     Basophil %      0.0 - 1.9 % 0.9     Differential Method Automated     Sodium      136 - 145 mmol/L 140  140    Potassium      3.5 - 5.1 mmol/L 4.3  4.7    Chloride      95 - 110 mmol/L 103  105    CO2      23 - 29 mmol/L 26  24    Glucose      70 - 110 mg/dL 200 (H)  153 (H)    BUN      8 - 23 mg/dL 10  15    Creatinine      0.5 - 1.4 mg/dL 0.9  0.8    Calcium      8.7 - 10.5 mg/dL 10.1  10.0    PROTEIN TOTAL      6.0 - 8.4 g/dL 8.2  7.9    Albumin      3.5 - 5.2 g/dL 4.2  4.0    BILIRUBIN TOTAL      0.1 - 1.0 mg/dL 1.0  0.8    Alkaline Phosphatase      55 - 135 U/L 115  93    AST      10 - 40 U/L 30  25    ALT      10 - 44 U/L 27  16    Anion Gap      8 - 16 mmol/L 11  11    eGFR      >60 mL/min/1.73 m^2 >60.0  >60.0    Cholesterol      120 - 199 mg/dL 153     Triglycerides      30 - 150 mg/dL 225 (H)     HDL      40 - 75 mg/dL 50     LDL Cholesterol External      63.0 - 159.0 mg/dL 58.0 (L)     HDL/Cholesterol Ratio      20.0 - 50.0 % 32.7     Total Cholesterol/HDL Ratio      2.0 - 5.0  3.1     Non-HDL Cholesterol      mg/dL 103     Urine Microalbumin      ug/mL 10.0     Creatinine, Urine      15.0 - 325.0 mg/dL 45.0     MICROALB/CREAT RATIO      0.0 - 30.0 ug/mg 22.2     Hemoglobin A1C External      4.0 - 5.6 % 8.4 (H)  7.4 (H)    Estimated Avg Glucose      68 - 131 mg/dL 194 (H)  166 (H)    Ferritin      20.0 - 300.0 ng/mL  55       Legend:  (H) High  (L) Low

## 2023-09-28 ENCOUNTER — OFFICE VISIT (OUTPATIENT)
Dept: FAMILY MEDICINE | Facility: CLINIC | Age: 78
End: 2023-09-28
Payer: MEDICARE

## 2023-09-28 VITALS
TEMPERATURE: 98 F | BODY MASS INDEX: 27.09 KG/M2 | HEART RATE: 73 BPM | SYSTOLIC BLOOD PRESSURE: 128 MMHG | HEIGHT: 63 IN | DIASTOLIC BLOOD PRESSURE: 58 MMHG | OXYGEN SATURATION: 97 % | WEIGHT: 152.88 LBS

## 2023-09-28 DIAGNOSIS — E78.5 DYSLIPIDEMIA: ICD-10-CM

## 2023-09-28 DIAGNOSIS — G25.81 RLS (RESTLESS LEGS SYNDROME): ICD-10-CM

## 2023-09-28 DIAGNOSIS — I10 ESSENTIAL HYPERTENSION: ICD-10-CM

## 2023-09-28 DIAGNOSIS — E11.9 TYPE 2 DIABETES MELLITUS WITHOUT COMPLICATION, WITHOUT LONG-TERM CURRENT USE OF INSULIN: Primary | ICD-10-CM

## 2023-09-28 DIAGNOSIS — E61.1 IRON DEFICIENCY: ICD-10-CM

## 2023-09-28 PROCEDURE — 1159F PR MEDICATION LIST DOCUMENTED IN MEDICAL RECORD: ICD-10-PCS | Mod: CPTII,S$GLB,, | Performed by: INTERNAL MEDICINE

## 2023-09-28 PROCEDURE — 3078F DIAST BP <80 MM HG: CPT | Mod: CPTII,S$GLB,, | Performed by: INTERNAL MEDICINE

## 2023-09-28 PROCEDURE — 1101F PR PT FALLS ASSESS DOC 0-1 FALLS W/OUT INJ PAST YR: ICD-10-PCS | Mod: CPTII,S$GLB,, | Performed by: INTERNAL MEDICINE

## 2023-09-28 PROCEDURE — 1160F PR REVIEW ALL MEDS BY PRESCRIBER/CLIN PHARMACIST DOCUMENTED: ICD-10-PCS | Mod: CPTII,S$GLB,, | Performed by: INTERNAL MEDICINE

## 2023-09-28 PROCEDURE — 3074F PR MOST RECENT SYSTOLIC BLOOD PRESSURE < 130 MM HG: ICD-10-PCS | Mod: CPTII,S$GLB,, | Performed by: INTERNAL MEDICINE

## 2023-09-28 PROCEDURE — 3078F PR MOST RECENT DIASTOLIC BLOOD PRESSURE < 80 MM HG: ICD-10-PCS | Mod: CPTII,S$GLB,, | Performed by: INTERNAL MEDICINE

## 2023-09-28 PROCEDURE — 1126F PR PAIN SEVERITY QUANTIFIED, NO PAIN PRESENT: ICD-10-PCS | Mod: CPTII,S$GLB,, | Performed by: INTERNAL MEDICINE

## 2023-09-28 PROCEDURE — 3074F SYST BP LT 130 MM HG: CPT | Mod: CPTII,S$GLB,, | Performed by: INTERNAL MEDICINE

## 2023-09-28 PROCEDURE — 99999 PR PBB SHADOW E&M-EST. PATIENT-LVL IV: CPT | Mod: PBBFAC,,, | Performed by: INTERNAL MEDICINE

## 2023-09-28 PROCEDURE — 1101F PT FALLS ASSESS-DOCD LE1/YR: CPT | Mod: CPTII,S$GLB,, | Performed by: INTERNAL MEDICINE

## 2023-09-28 PROCEDURE — 1160F RVW MEDS BY RX/DR IN RCRD: CPT | Mod: CPTII,S$GLB,, | Performed by: INTERNAL MEDICINE

## 2023-09-28 PROCEDURE — 99999 PR PBB SHADOW E&M-EST. PATIENT-LVL IV: ICD-10-PCS | Mod: PBBFAC,,, | Performed by: INTERNAL MEDICINE

## 2023-09-28 PROCEDURE — 1159F MED LIST DOCD IN RCRD: CPT | Mod: CPTII,S$GLB,, | Performed by: INTERNAL MEDICINE

## 2023-09-28 PROCEDURE — 3288F PR FALLS RISK ASSESSMENT DOCUMENTED: ICD-10-PCS | Mod: CPTII,S$GLB,, | Performed by: INTERNAL MEDICINE

## 2023-09-28 PROCEDURE — 99214 PR OFFICE/OUTPT VISIT, EST, LEVL IV, 30-39 MIN: ICD-10-PCS | Mod: S$GLB,,, | Performed by: INTERNAL MEDICINE

## 2023-09-28 PROCEDURE — 99214 OFFICE O/P EST MOD 30 MIN: CPT | Mod: S$GLB,,, | Performed by: INTERNAL MEDICINE

## 2023-09-28 PROCEDURE — 1126F AMNT PAIN NOTED NONE PRSNT: CPT | Mod: CPTII,S$GLB,, | Performed by: INTERNAL MEDICINE

## 2023-09-28 PROCEDURE — 3288F FALL RISK ASSESSMENT DOCD: CPT | Mod: CPTII,S$GLB,, | Performed by: INTERNAL MEDICINE

## 2023-09-28 RX ORDER — METFORMIN HYDROCHLORIDE 500 MG/1
1000 TABLET, EXTENDED RELEASE ORAL 2 TIMES DAILY WITH MEALS
Qty: 360 TABLET | Refills: 3 | Status: SHIPPED | OUTPATIENT
Start: 2023-09-28

## 2023-09-28 RX ORDER — AMLODIPINE BESYLATE 10 MG/1
10 TABLET ORAL DAILY
Qty: 90 TABLET | Refills: 3 | Status: SHIPPED | OUTPATIENT
Start: 2023-09-28

## 2023-10-06 DIAGNOSIS — E11.9 DIABETES MELLITUS TYPE 2 WITHOUT RETINOPATHY: ICD-10-CM

## 2023-10-06 NOTE — TELEPHONE ENCOUNTER
No care due was identified.  [facility] Embedded Care Due Messages. Reference number: [id number].   10/06/2023 10:08:07 AM CDT

## 2023-10-09 ENCOUNTER — TELEPHONE (OUTPATIENT)
Dept: FAMILY MEDICINE | Facility: CLINIC | Age: 78
End: 2023-10-09
Payer: MEDICARE

## 2023-10-09 RX ORDER — LANCETS 33 GAUGE
1 EACH MISCELLANEOUS DAILY
Qty: 30 EACH | Refills: 11 | Status: SHIPPED | OUTPATIENT
Start: 2023-10-09 | End: 2023-11-08

## 2024-01-03 DIAGNOSIS — E11.9 TYPE 2 DIABETES MELLITUS WITHOUT COMPLICATION, WITHOUT LONG-TERM CURRENT USE OF INSULIN: ICD-10-CM

## 2024-01-03 DIAGNOSIS — E11.9 DIABETES MELLITUS TYPE 2 WITHOUT RETINOPATHY: ICD-10-CM

## 2024-01-03 NOTE — TELEPHONE ENCOUNTER
Care Due:                  Date            Visit Type   Department     Provider  --------------------------------------------------------------------------------                                [facility]                              PRIMARY      MED/ INTERNAL  Last Visit: 09-      CARE ([facility])   MED/ GONZALO Donato                              [facility]                              PRIMARY      MED/ INTERNAL  Next Visit: 04-      CARE ([facility])   MED/ GONZALO Donato                                                            Last  Test          Frequency    Reason                     Performed    Due Date  --------------------------------------------------------------------------------    HBA1C.......  6 months...  glipiZIDE, metFORMIN,      09- 03-                             pioglitazone.............    [employer] Embedded Care Due Messages. Reference number: [id number].   1/03/2024 10:15:53 AM CST

## 2024-01-04 RX ORDER — PIOGLITAZONEHYDROCHLORIDE 30 MG/1
30 TABLET ORAL
Qty: 90 TABLET | Refills: 0 | Status: SHIPPED | OUTPATIENT
Start: 2024-01-04

## 2024-01-05 NOTE — TELEPHONE ENCOUNTER
Provider Staff:  Action required for this patient    Requires labs      Please see care gap opportunities below in Care Due Message.    Thanks!  [facility]     Appointments      Date Provider   Last Visit   9/28/2023 Kameron Donato MD   Next Visit   4/1/2024 Kameron Donato MD     Refill Decision Note   Ramya Mayo  is requesting a refill authorization.  Brief Assessment and Rationale for Refill:  Approve     Medication Therapy Plan:         Comments:     Note composed:11:18 PM 01/04/2024

## 2024-03-25 ENCOUNTER — LAB VISIT (OUTPATIENT)
Dept: LAB | Facility: HOSPITAL | Age: 79
End: 2024-03-25
Attending: INTERNAL MEDICINE
Payer: MEDICARE

## 2024-03-25 DIAGNOSIS — E11.9 TYPE 2 DIABETES MELLITUS WITHOUT COMPLICATION, WITHOUT LONG-TERM CURRENT USE OF INSULIN: ICD-10-CM

## 2024-03-25 DIAGNOSIS — E61.1 IRON DEFICIENCY: ICD-10-CM

## 2024-03-25 LAB
ALBUMIN SERPL BCP-MCNC: 3.9 G/DL (ref 3.5–5.2)
ALP SERPL-CCNC: 103 U/L (ref 55–135)
ALT SERPL W/O P-5'-P-CCNC: 14 U/L (ref 10–44)
ANION GAP SERPL CALC-SCNC: 9 MMOL/L (ref 8–16)
AST SERPL-CCNC: 22 U/L (ref 10–40)
BILIRUB SERPL-MCNC: 0.7 MG/DL (ref 0.1–1)
BUN SERPL-MCNC: 18 MG/DL (ref 8–23)
CALCIUM SERPL-MCNC: 10.1 MG/DL (ref 8.7–10.5)
CHLORIDE SERPL-SCNC: 107 MMOL/L (ref 95–110)
CHOLEST SERPL-MCNC: 156 MG/DL (ref 120–199)
CHOLEST/HDLC SERPL: 2.9 {RATIO} (ref 2–5)
CO2 SERPL-SCNC: 25 MMOL/L (ref 23–29)
CREAT SERPL-MCNC: 0.9 MG/DL (ref 0.5–1.4)
ERYTHROCYTE [DISTWIDTH] IN BLOOD BY AUTOMATED COUNT: 14.8 % (ref 11.5–14.5)
EST. GFR  (NO RACE VARIABLE): >60 ML/MIN/1.73 M^2
ESTIMATED AVG GLUCOSE: 151 MG/DL (ref 68–131)
FERRITIN SERPL-MCNC: 12 NG/ML (ref 20–300)
GLUCOSE SERPL-MCNC: 149 MG/DL (ref 70–110)
HBA1C MFR BLD: 6.9 % (ref 4–5.6)
HCT VFR BLD AUTO: 35.6 % (ref 37–48.5)
HDLC SERPL-MCNC: 54 MG/DL (ref 40–75)
HDLC SERPL: 34.6 % (ref 20–50)
HGB BLD-MCNC: 10.2 G/DL (ref 12–16)
LDLC SERPL CALC-MCNC: 79 MG/DL (ref 63–159)
MCH RBC QN AUTO: 19.5 PG (ref 27–31)
MCHC RBC AUTO-ENTMCNC: 28.7 G/DL (ref 32–36)
MCV RBC AUTO: 68 FL (ref 82–98)
NONHDLC SERPL-MCNC: 102 MG/DL
PLATELET # BLD AUTO: 328 K/UL (ref 150–450)
PMV BLD AUTO: 10.8 FL (ref 9.2–12.9)
POTASSIUM SERPL-SCNC: 4.5 MMOL/L (ref 3.5–5.1)
PROT SERPL-MCNC: 7.5 G/DL (ref 6–8.4)
RBC # BLD AUTO: 5.22 M/UL (ref 4–5.4)
SODIUM SERPL-SCNC: 141 MMOL/L (ref 136–145)
TRIGL SERPL-MCNC: 115 MG/DL (ref 30–150)
WBC # BLD AUTO: 7.95 K/UL (ref 3.9–12.7)

## 2024-03-25 PROCEDURE — 85027 COMPLETE CBC AUTOMATED: CPT | Performed by: INTERNAL MEDICINE

## 2024-03-25 PROCEDURE — 80053 COMPREHEN METABOLIC PANEL: CPT | Performed by: INTERNAL MEDICINE

## 2024-03-25 PROCEDURE — 36415 COLL VENOUS BLD VENIPUNCTURE: CPT | Mod: PO | Performed by: INTERNAL MEDICINE

## 2024-03-25 PROCEDURE — 82728 ASSAY OF FERRITIN: CPT | Performed by: INTERNAL MEDICINE

## 2024-03-25 PROCEDURE — 83036 HEMOGLOBIN GLYCOSYLATED A1C: CPT | Performed by: INTERNAL MEDICINE

## 2024-03-25 PROCEDURE — 80061 LIPID PANEL: CPT | Performed by: INTERNAL MEDICINE

## 2024-04-02 DIAGNOSIS — E11.9 DIABETES MELLITUS TYPE 2 WITHOUT RETINOPATHY: ICD-10-CM

## 2024-04-02 DIAGNOSIS — E11.9 TYPE 2 DIABETES MELLITUS WITHOUT COMPLICATION, WITHOUT LONG-TERM CURRENT USE OF INSULIN: ICD-10-CM

## 2024-04-02 NOTE — TELEPHONE ENCOUNTER
No care due was identified.  [facility] Embedded Care Due Messages. Reference number: [id number].   4/02/2024 4:30:23 PM CDT

## 2024-04-03 RX ORDER — PIOGLITAZONEHYDROCHLORIDE 30 MG/1
30 TABLET ORAL
Qty: 90 TABLET | Refills: 1 | Status: SHIPPED | OUTPATIENT
Start: 2024-04-03

## 2024-04-03 NOTE — TELEPHONE ENCOUNTER
Refill Decision Note   Ramya Mayo  is requesting a refill authorization.  Brief Assessment and Rationale for Refill:  Approve     Medication Therapy Plan:         Comments:     Note composed:3:29 PM 04/03/2024

## 2024-04-11 ENCOUNTER — TELEPHONE (OUTPATIENT)
Dept: FAMILY MEDICINE | Facility: CLINIC | Age: 79
End: 2024-04-11
Payer: MEDICARE

## 2024-04-11 DIAGNOSIS — E61.1 IRON DEFICIENCY: Primary | ICD-10-CM

## 2024-04-11 NOTE — TELEPHONE ENCOUNTER
CBC microcytic anemia, hemoglobin drop from 12 down to 10.  I strongly suspect underlying thalassemia but her ferritin did drop as well.  Concern for iron-deficiency anemia superimposed on underlying hemoglobinopathy   I spoke with the patient.  Notify her of the results.  I would recommend EGD and colonoscopy to investigate.  She has never had a colonoscopy.    She is noncommittal.  She wants to think about it.    I told her I would call her in a week and see what she would like to do.  If she is adamant about refusing may need to have her start taking iron supplement again.

## 2024-05-02 NOTE — TELEPHONE ENCOUNTER
Spoke with the patient, she initially requested Cologuard but discussed that Cologuard will not be sufficient to determine etiology.  After discussion she would be agreeable for referral to EGD and colonoscopy  Staff please assist patient in scheduling phone call for colonoscopy/EGD scheduling

## 2024-05-02 NOTE — TELEPHONE ENCOUNTER
Attempted to contact patient. Left a voice mail to call clinic back. Colonoscopy call scheduled for 06/12/24.

## 2024-05-02 NOTE — TELEPHONE ENCOUNTER
I left VM on listed home phone number asking her to call back.    Would inquire if she agrees with recommendation to move forward with EGD/colonoscopy that we talked about with last phone call    Listed cell phone # [phone] disconnected

## 2024-08-27 ENCOUNTER — PATIENT OUTREACH (OUTPATIENT)
Dept: ADMINISTRATIVE | Facility: HOSPITAL | Age: 79
End: 2024-08-27
Payer: MEDICARE

## 2024-09-04 ENCOUNTER — LAB VISIT (OUTPATIENT)
Dept: LAB | Facility: HOSPITAL | Age: 79
End: 2024-09-04
Attending: INTERNAL MEDICINE
Payer: MEDICARE

## 2024-09-04 ENCOUNTER — OFFICE VISIT (OUTPATIENT)
Dept: FAMILY MEDICINE | Facility: CLINIC | Age: 79
End: 2024-09-04
Payer: MEDICARE

## 2024-09-04 VITALS
BODY MASS INDEX: 25.9 KG/M2 | WEIGHT: 146.19 LBS | TEMPERATURE: 99 F | DIASTOLIC BLOOD PRESSURE: 66 MMHG | OXYGEN SATURATION: 97 % | HEART RATE: 88 BPM | HEIGHT: 63 IN | SYSTOLIC BLOOD PRESSURE: 138 MMHG

## 2024-09-04 DIAGNOSIS — E11.9 DIABETES MELLITUS TYPE 2 WITHOUT RETINOPATHY: ICD-10-CM

## 2024-09-04 DIAGNOSIS — E11.9 TYPE 2 DIABETES MELLITUS WITHOUT COMPLICATION, WITHOUT LONG-TERM CURRENT USE OF INSULIN: ICD-10-CM

## 2024-09-04 DIAGNOSIS — E61.1 IRON DEFICIENCY: ICD-10-CM

## 2024-09-04 DIAGNOSIS — H10.10 ALLERGIC CONJUNCTIVITIS, UNSPECIFIED LATERALITY: ICD-10-CM

## 2024-09-04 DIAGNOSIS — I35.0 NONRHEUMATIC AORTIC VALVE STENOSIS: ICD-10-CM

## 2024-09-04 DIAGNOSIS — I10 ESSENTIAL HYPERTENSION: ICD-10-CM

## 2024-09-04 DIAGNOSIS — E61.1 IRON DEFICIENCY: Primary | ICD-10-CM

## 2024-09-04 DIAGNOSIS — E78.5 DYSLIPIDEMIA: ICD-10-CM

## 2024-09-04 LAB
ALBUMIN SERPL BCP-MCNC: 3.9 G/DL (ref 3.5–5.2)
ALP SERPL-CCNC: 129 U/L (ref 55–135)
ALT SERPL W/O P-5'-P-CCNC: 24 U/L (ref 10–44)
ANION GAP SERPL CALC-SCNC: 11 MMOL/L (ref 8–16)
AST SERPL-CCNC: 29 U/L (ref 10–40)
BILIRUB SERPL-MCNC: 0.7 MG/DL (ref 0.1–1)
BUN SERPL-MCNC: 13 MG/DL (ref 8–23)
CALCIUM SERPL-MCNC: 9.9 MG/DL (ref 8.7–10.5)
CHLORIDE SERPL-SCNC: 105 MMOL/L (ref 95–110)
CO2 SERPL-SCNC: 22 MMOL/L (ref 23–29)
CREAT SERPL-MCNC: 0.9 MG/DL (ref 0.5–1.4)
ERYTHROCYTE [DISTWIDTH] IN BLOOD BY AUTOMATED COUNT: 18.5 % (ref 11.5–14.5)
EST. GFR  (NO RACE VARIABLE): >60 ML/MIN/1.73 M^2
ESTIMATED AVG GLUCOSE: 166 MG/DL (ref 68–131)
FERRITIN SERPL-MCNC: 24 NG/ML (ref 20–300)
GLUCOSE SERPL-MCNC: 133 MG/DL (ref 70–110)
HBA1C MFR BLD: 7.4 % (ref 4–5.6)
HCT VFR BLD AUTO: 39.1 % (ref 37–48.5)
HGB BLD-MCNC: 11.1 G/DL (ref 12–16)
IRON SERPL-MCNC: 35 UG/DL (ref 30–160)
MCH RBC QN AUTO: 19.1 PG (ref 27–31)
MCHC RBC AUTO-ENTMCNC: 28.4 G/DL (ref 32–36)
MCV RBC AUTO: 67 FL (ref 82–98)
PLATELET # BLD AUTO: 301 K/UL (ref 150–450)
PMV BLD AUTO: 10.2 FL (ref 9.2–12.9)
POTASSIUM SERPL-SCNC: 3.7 MMOL/L (ref 3.5–5.1)
PROT SERPL-MCNC: 8.1 G/DL (ref 6–8.4)
RBC # BLD AUTO: 5.8 M/UL (ref 4–5.4)
RETICS/RBC NFR AUTO: 1.6 % (ref 0.5–2.5)
SATURATED IRON: 8 % (ref 20–50)
SODIUM SERPL-SCNC: 138 MMOL/L (ref 136–145)
TOTAL IRON BINDING CAPACITY: 466 UG/DL (ref 250–450)
TRANSFERRIN SERPL-MCNC: 315 MG/DL (ref 200–375)
WBC # BLD AUTO: 8.14 K/UL (ref 3.9–12.7)

## 2024-09-04 PROCEDURE — G2211 COMPLEX E/M VISIT ADD ON: HCPCS | Mod: S$GLB,,, | Performed by: INTERNAL MEDICINE

## 2024-09-04 PROCEDURE — 1126F AMNT PAIN NOTED NONE PRSNT: CPT | Mod: CPTII,S$GLB,, | Performed by: INTERNAL MEDICINE

## 2024-09-04 PROCEDURE — 85027 COMPLETE CBC AUTOMATED: CPT | Performed by: INTERNAL MEDICINE

## 2024-09-04 PROCEDURE — 82728 ASSAY OF FERRITIN: CPT | Performed by: INTERNAL MEDICINE

## 2024-09-04 PROCEDURE — 3075F SYST BP GE 130 - 139MM HG: CPT | Mod: CPTII,S$GLB,, | Performed by: INTERNAL MEDICINE

## 2024-09-04 PROCEDURE — 99214 OFFICE O/P EST MOD 30 MIN: CPT | Mod: S$GLB,,, | Performed by: INTERNAL MEDICINE

## 2024-09-04 PROCEDURE — 85045 AUTOMATED RETICULOCYTE COUNT: CPT | Performed by: INTERNAL MEDICINE

## 2024-09-04 PROCEDURE — 3078F DIAST BP <80 MM HG: CPT | Mod: CPTII,S$GLB,, | Performed by: INTERNAL MEDICINE

## 2024-09-04 PROCEDURE — 83036 HEMOGLOBIN GLYCOSYLATED A1C: CPT | Performed by: INTERNAL MEDICINE

## 2024-09-04 PROCEDURE — 1159F MED LIST DOCD IN RCRD: CPT | Mod: CPTII,S$GLB,, | Performed by: INTERNAL MEDICINE

## 2024-09-04 PROCEDURE — 3072F LOW RISK FOR RETINOPATHY: CPT | Mod: CPTII,S$GLB,, | Performed by: INTERNAL MEDICINE

## 2024-09-04 PROCEDURE — 1160F RVW MEDS BY RX/DR IN RCRD: CPT | Mod: CPTII,S$GLB,, | Performed by: INTERNAL MEDICINE

## 2024-09-04 PROCEDURE — 84466 ASSAY OF TRANSFERRIN: CPT | Performed by: INTERNAL MEDICINE

## 2024-09-04 PROCEDURE — 1101F PT FALLS ASSESS-DOCD LE1/YR: CPT | Mod: CPTII,S$GLB,, | Performed by: INTERNAL MEDICINE

## 2024-09-04 PROCEDURE — 3288F FALL RISK ASSESSMENT DOCD: CPT | Mod: CPTII,S$GLB,, | Performed by: INTERNAL MEDICINE

## 2024-09-04 PROCEDURE — 99999 PR PBB SHADOW E&M-EST. PATIENT-LVL III: CPT | Mod: PBBFAC,,, | Performed by: INTERNAL MEDICINE

## 2024-09-04 PROCEDURE — 80053 COMPREHEN METABOLIC PANEL: CPT | Performed by: INTERNAL MEDICINE

## 2024-09-04 RX ORDER — LOSARTAN POTASSIUM 100 MG/1
100 TABLET ORAL DAILY
Qty: 90 TABLET | Refills: 3 | Status: SHIPPED | OUTPATIENT
Start: 2024-09-04

## 2024-09-04 RX ORDER — METOPROLOL SUCCINATE 100 MG/1
100 TABLET, EXTENDED RELEASE ORAL DAILY
Qty: 90 TABLET | Refills: 3 | Status: SHIPPED | OUTPATIENT
Start: 2024-09-04

## 2024-09-04 RX ORDER — METFORMIN HYDROCHLORIDE 500 MG/1
1000 TABLET, EXTENDED RELEASE ORAL 2 TIMES DAILY WITH MEALS
Qty: 360 TABLET | Refills: 3 | Status: SHIPPED | OUTPATIENT
Start: 2024-09-04

## 2024-09-04 RX ORDER — PIOGLITAZONEHYDROCHLORIDE 30 MG/1
30 TABLET ORAL DAILY
Qty: 90 TABLET | Refills: 3 | Status: SHIPPED | OUTPATIENT
Start: 2024-09-04

## 2024-09-04 RX ORDER — FERROUS SULFATE 325(65) MG
325 TABLET ORAL
Qty: 90 TABLET | Refills: 0 | Status: SHIPPED | OUTPATIENT
Start: 2024-09-04

## 2024-09-04 RX ORDER — GLIPIZIDE 10 MG/1
10 TABLET, FILM COATED, EXTENDED RELEASE ORAL
Qty: 90 TABLET | Refills: 3 | Status: SHIPPED | OUTPATIENT
Start: 2024-09-04 | End: 2025-09-04

## 2024-09-04 RX ORDER — OLOPATADINE HYDROCHLORIDE 1 MG/ML
1 SOLUTION/ DROPS OPHTHALMIC 2 TIMES DAILY
Qty: 5 ML | Refills: 2 | Status: SHIPPED | OUTPATIENT
Start: 2024-09-04 | End: 2025-09-04

## 2024-09-04 RX ORDER — AMLODIPINE BESYLATE 10 MG/1
10 TABLET ORAL DAILY
Qty: 90 TABLET | Refills: 3 | Status: SHIPPED | OUTPATIENT
Start: 2024-09-04

## 2024-09-04 RX ORDER — ATORVASTATIN CALCIUM 40 MG/1
40 TABLET, FILM COATED ORAL DAILY
Qty: 90 TABLET | Refills: 3 | Status: SHIPPED | OUTPATIENT
Start: 2024-09-04

## 2024-09-04 NOTE — PROGRESS NOTES
This note was created by combination of typed  and M-Modal dictation.  Transcription errors may be present.  If there are any questions, please contact me.    Assessment and Plan:   Assessment and Plan    Iron deficiency  -never colonoscopy.  Long discussion with the patient today.  She was iron-deficiency.  Discussed the importance of trying to figure out the cause and most likely cause tends to be gastrointestinal.  She has never had a colonoscopy in his wary.  She was extremely reluctant to agree to colonoscopy but after discussion she would be willing to schedule an appointment with GI scheduling.    Check CBC, iron profile.    Start iron oral  -     CBC Without Differential; Future; Expected date: 09/05/2024  -     Iron and TIBC; Future; Expected date: 09/05/2024  -     Ferritin; Future; Expected date: 09/05/2024  -     Reticulocytes; Future; Expected date: 09/05/2024  -     ferrous sulfate (FEOSOL) 325 mg (65 mg iron) Tab tablet; Take 1 tablet (325 mg total) by mouth daily with breakfast.  Dispense: 90 tablet; Refill: 0  -     Ambulatory referral/consult to Endo Procedure ; Future; Expected date: 09/05/2024    Nonrheumatic aortic valve stenosis mod severe monitor q6 months  -overdue for follow-up with Cardiology.  Because she lives down in [facility], will order echo and have staff schedule follow-up with Cardiology to review.  Denies chest pain shortness for breath, lightheadedness, palpitations.  -     Echo Saline Bubble? No; Ultrasound enhancing contrast? No; Future    Type 2 diabetes mellitus without complication, without long-term current use of insulin; januvia expensive;1/2020 actos  Diabetes mellitus type 2 without retinopathy  -reports she was taking metformin, Januvia, and glipizide though no recent fills on glipizide.  Check A1c.  Confounder of A1c accuracy maybe iron-deficiency anemia/blood loss  -     Comprehensive Metabolic Panel; Future; Expected date: 09/05/2024  -     CBC  Without Differential; Future; Expected date: 09/05/2024  -     Hemoglobin A1C; Future; Expected date: 09/05/2024  -     glipiZIDE (GLUCOTROL) 10 MG TR24; Take 1 tablet (10 mg total) by mouth daily with breakfast.  Dispense: 90 tablet; Refill: 3  -     metFORMIN (GLUCOPHAGE-XR) 500 MG ER 24hr tablet; Take 2 tablets (1,000 mg total) by mouth 2 (two) times daily with meals.  Dispense: 360 tablet; Refill: 3  -     pioglitazone (ACTOS) 30 MG tablet; Take 1 tablet (30 mg total) by mouth once daily.  Dispense: 90 tablet; Refill: 3    Essential hypertension  -BP today okay.  Updated prescription for amlodipine, losartan, metoprolol  -     amLODIPine (NORVASC) 10 MG tablet; Take 1 tablet (10 mg total) by mouth once daily.  Dispense: 90 tablet; Refill: 3  -     losartan (COZAAR) 100 MG tablet; Take 1 tablet (100 mg total) by mouth once daily.  Dispense: 90 tablet; Refill: 3  -     metoprolol succinate (TOPROL-XL) 100 MG 24 hr tablet; Take 1 tablet (100 mg total) by mouth once daily.  Dispense: 90 tablet; Refill: 3    Dyslipidemia 7/2018 changed prava to atorva  -last lipid profile in March good on atorvastatin, update prescription  -     atorvastatin (LIPITOR) 40 MG tablet; Take 1 tablet (40 mg total) by mouth once daily.  Dispense: 90 tablet; Refill: 3    Allergic conjunctivitis, unspecified laterality  -eyes are watery, will treat for possible allergic conjunctivitis with Patanol.  -     olopatadine (PATANOL) 0.1 % ophthalmic solution; Place 1 drop into both eyes 2 (two) times daily.  Dispense: 5 mL; Refill: 2    Visit today included increased complexity associated with the care of the episodic problem addressed and managing the longitudinal care of the patient due to the serious and/or complex managed problem(s).      Medications Discontinued During This Encounter   Medication Reason    glipiZIDE (GLUCOTROL) 10 MG TR24 Reorder    losartan (COZAAR) 100 MG tablet Reorder    atorvastatin (LIPITOR) 40 MG tablet Reorder     metoprolol succinate (TOPROL-XL) 100 MG 24 hr tablet Reorder    amLODIPine (NORVASC) 10 MG tablet Reorder    metFORMIN (GLUCOPHAGE-XR) 500 MG ER 24hr tablet Reorder    pioglitazone (ACTOS) 30 MG tablet Reorder    aspirin (ECOTRIN) 81 MG EC tablet Therapy completed    montelukast (SINGULAIR) 10 mg tablet        meds sent this encounter:  Medications Ordered This Encounter   Medications    amLODIPine (NORVASC) 10 MG tablet     Sig: Take 1 tablet (10 mg total) by mouth once daily.     Dispense:  90 tablet     Refill:  3     Pharmacy update refills, keep on file, not requesting Rx to be filled today.    atorvastatin (LIPITOR) 40 MG tablet     Sig: Take 1 tablet (40 mg total) by mouth once daily.     Dispense:  90 tablet     Refill:  3     Pharmacy update refills, keep on file, not requesting Rx to be filled today.    ferrous sulfate (FEOSOL) 325 mg (65 mg iron) Tab tablet     Sig: Take 1 tablet (325 mg total) by mouth daily with breakfast.     Dispense:  90 tablet     Refill:  0    glipiZIDE (GLUCOTROL) 10 MG TR24     Sig: Take 1 tablet (10 mg total) by mouth daily with breakfast.     Dispense:  90 tablet     Refill:  3     Pharmacy update refills, keep on file, not requesting Rx to be filled today.    losartan (COZAAR) 100 MG tablet     Sig: Take 1 tablet (100 mg total) by mouth once daily.     Dispense:  90 tablet     Refill:  3     Pharmacy update refills, keep on file, not requesting Rx to be filled today.    metFORMIN (GLUCOPHAGE-XR) 500 MG ER 24hr tablet     Sig: Take 2 tablets (1,000 mg total) by mouth 2 (two) times daily with meals.     Dispense:  360 tablet     Refill:  3     Pharmacy update refills, keep on file, not requesting Rx to be filled today.    metoprolol succinate (TOPROL-XL) 100 MG 24 hr tablet     Sig: Take 1 tablet (100 mg total) by mouth once daily.     Dispense:  90 tablet     Refill:  3     Pharmacy update refills, keep on file, not requesting Rx to be filled today.    olopatadine (PATANOL) 0.1  % ophthalmic solution     Sig: Place 1 drop into both eyes 2 (two) times daily.     Dispense:  5 mL     Refill:  2    pioglitazone (ACTOS) 30 MG tablet     Sig: Take 1 tablet (30 mg total) by mouth once daily.     Dispense:  90 tablet     Refill:  3     Pharmacy update refills, keep on file, not requesting Rx to be filled today.         Follow Up:  Follow-up 3 months.  At that time plan for repeat CBC and iron profile, hopefully to have seen Cardiology, and hopefully to have at least gotten scheduled for colonoscopy/EGD  Future Appointments   Date Time Provider Department Center   2024  2:20 PM Kameron Donato MD [facility]         Subjective:   Subjective   Chief Complaint   Patient presents with    Follow-up    Diabetes    Hypertension       HPI  Ramya is a 78 y.o. female.    Social History     Tobacco Use    Smoking status: Never    Smokeless tobacco: Never   Substance Use Topics    Alcohol use: No          Social History     Social History Narrative    Lives in [place].    2014.         No LMP recorded. Patient has had a hysterectomy.    Last appointment with this clinic was Visit date not found. Last visit with me 2023   To summarize last visit and events leading up to today:  Diabetes with peripheral artery disease.  A1c went up.  History of Rybelsus but not able to use consistent because of issues with the donut hole.  I started her on pioglitazone.  Stay on glipizide, metformin, increase pioglitazone  Hypertension borderline readings last visit   Hyperlipidemia triglycerides high, non HDL good.  Statin.    Aortic valve stenosis moderate severe, followed by Cardiology.  Plan is routine follow-up monitoring with echo  Hair loss, iron deficiency, restless legs, last ferritin check was less than 50.  Last visit not taking dedicated iron supplement.  I sent in a prescription.  No longer taking ropinirole.  Microcytic anemia suspicious for thalassemia.      Cardiology  appointment and Cardiology echo canceled     Last visit with me 09/2023   Diabetes stable on metformin Actos and glipizide.  History of Rybelsus, when she gets a donut hole it is too expensive.  Needs to follow up with Cardiology regarding aortic stenosis    03/25/2024 labs  CMP normal   CBC microcytic anemia stable   Ferritin low 12  A1c 6.9    She had a follow up appointment with me 04/01/2024, she canceled    I had ordered EGD and colonoscopy  Intake phone call 06/12/2024 canceled    Today's visit:    Eyes watery  Tried zyrtec  Some times of year it's worse than others.    OTC eye drops without relief.      Reviewed her previous results.  Iron-deficiency anemia.  Not taking any sort of iron supplement.    She had canceled the appointment with GI scheduling.    She has never had a colonoscopy.  We talked about blood loss, need to rule out GI source, but she remains very leery of agreeing to proceed.  After long discussion she would be willing to schedule appointment with GI scheduling  Would recommend she start taking an iron supplement.  We talked about consequences of bleeding including increase risk of death, disability etc.  No BRBPR    She has lost to follow up with Cardiology.  History of moderate severe aortic stenosis.  Denies dizziness, chest pain, lightheadedness, presyncope, palpitations.  Needs to update echo and follow up with Cardiology    Thinks she is taking glipizide  And pioglitazone  No rcent fills on glipizdie but is pretty sure she's taking it.  Taking metformin          Patient Care Team:  Kameron Donato MD as PCP - General (Internal Medicine)  Tessa Baltazar OD as Consulting Physician (Optometry)  Maximino Ye MA as Care Coordinator      Patient Active Problem List    Diagnosis Date Noted    Calculus of gallbladder without cholecystitis without obstruction incidental on RUQ US 1/2021 01/22/2021 1/22/2021 RUQ US: Cholelithiasis.  No gallbladder wall thickening or pericholecystic  fluid.  Negative sonographic Taylor sign.      Elevated alkaline phosphatase level, liver fraction, 1/2021 RUQ US normal except incidental gallstones 01/22/2021    Microcytic anemia mentzer index < 13, suspect this is underlying thalassemia. normal ferritin 04/09/2019    RLS (restless legs syndrome) 07/05/2018    Nonrheumatic aortic valve stenosis mod severe monitor q6 months 06/06/2018 01/11/2023 TTE LV normal size and systolic function LVEF 65%.  Grade 1 diastolic dysfunction.  Moderate to severe aortic stenosis.  01/11/2023 nuclear stress test negative for ischemia      Nuclear sclerosis of both eyes 05/11/2018    Refractive error 05/11/2018    Diabetes mellitus type 2 without retinopathy 09/20/2016    Dyslipidemia 7/2018 changed prava to atorva 09/18/2012    Essential hypertension 09/18/2012    Type 2 diabetes mellitus without complication, without long-term current use of insulin; januvia expensive;1/2020 actos 09/18/2012    H/O: stroke with residual effects 09/18/2012       PAST MEDICAL PROBLEMS, PAST SURGICAL HISTORY: please see relevant portions of the electronic medical record    ALLERGIES AND MEDICATIONS: updated and reviewed.  Medication List with Changes/Refills   Current Medications    AMLODIPINE (NORVASC) 10 MG TABLET    Take 1 tablet (10 mg total) by mouth once daily.    ASPIRIN (ECOTRIN) 81 MG EC TABLET    Take 1 tablet (81 mg total) by mouth once daily.    ATORVASTATIN (LIPITOR) 40 MG TABLET    Take 1 tablet by mouth once daily    BLOOD SUGAR DIAGNOSTIC (TRUE METRIX GLUCOSE TEST STRIP) STRP    Daily glucose testing; whichever brand covered by insurance.    GLIPIZIDE (GLUCOTROL) 10 MG TR24    Take 1 tablet (10 mg total) by mouth daily with breakfast.    LANCETS (ONETOUCH DELICA LANCETS) 33 GAUGE MISC    1 lancet  by Misc.(Non-Drug; Combo Route) route once daily. ONCE DAILY BLOOD SUGAR TESTING; WHICHEVER BRAND COVERED BY INSURANCE    LOSARTAN (COZAAR) 100 MG TABLET    Take 1 tablet by mouth once  "daily    METFORMIN (GLUCOPHAGE-XR) 500 MG ER 24HR TABLET    Take 2 tablets (1,000 mg total) by mouth 2 (two) times daily with meals.    METOPROLOL SUCCINATE (TOPROL-XL) 100 MG 24 HR TABLET    Take 1 tablet by mouth once daily    MONTELUKAST (SINGULAIR) 10 MG TABLET    Take 10 mg by mouth every evening.    PIOGLITAZONE (ACTOS) 30 MG TABLET    Take 1 tablet by mouth once daily    TRAZODONE (DESYREL) 50 MG TABLET    Take 0.5 tablets (25 mg total) by mouth nightly as needed for Insomnia.         Objective:   Objective   Physical Exam   Vitals:    09/04/24 1357   BP: 138/66   Pulse: 88   Temp: 98.5 °F (36.9 °C)   TempSrc: Oral   SpO2: 97%   Weight: 66.3 kg (146 lb 2.6 oz)   Height: 5' 3" (1.6 m)    Body mass index is 25.89 kg/m².  Weight: 66.3 kg (146 lb 2.6 oz)   Height: 5' 3" (160 cm)     Physical Exam  Constitutional:       General: She is not in acute distress.     Appearance: She is well-developed.   Eyes:      Extraocular Movements: Extraocular movements intact.   Cardiovascular:      Rate and Rhythm: Normal rate and regular rhythm.      Heart sounds: Murmur heard.      Comments: 3/6 systolic murmur  Pulmonary:      Effort: Pulmonary effort is normal.      Breath sounds: Normal breath sounds.   Musculoskeletal:         General: Normal range of motion.      Right lower leg: No edema.      Left lower leg: No edema.   Skin:     General: Skin is warm and dry.   Neurological:      Mental Status: She is alert and oriented to person, place, and time.      Coordination: Coordination normal.   Psychiatric:         Behavior: Behavior normal.         Thought Content: Thought content normal.                "

## 2024-09-05 ENCOUNTER — TELEPHONE (OUTPATIENT)
Dept: FAMILY MEDICINE | Facility: CLINIC | Age: 79
End: 2024-09-05
Payer: MEDICARE

## 2024-09-05 DIAGNOSIS — E11.9 TYPE 2 DIABETES MELLITUS WITHOUT COMPLICATION, WITHOUT LONG-TERM CURRENT USE OF INSULIN: Primary | ICD-10-CM

## 2024-09-05 NOTE — PROGRESS NOTES
CMP WNL  A1c 7.4 from 6.9  CBC anemia though improved compared to previous  Ferritin slightly better compared to previous  Iron sat low  Retic count normal  Microalbuminuria new    Discussed at OV - start iron  Has EGD/colonoscopy intake call scheduled.    Repeat microalb with next labs.

## 2024-09-05 NOTE — PROGRESS NOTES
CMP WNL  A1c 7.4 from 6.9  CBC anemia though improved compared to previous  Ferritin slightly better compared to previous  Iron sat low  Retic count normal    Discussed at OV - start iron  Has EGD/colonoscopy intake call scheduled.

## 2024-09-05 NOTE — TELEPHONE ENCOUNTER
Please call pt - anemia still.  Iron is low.  Start the iron I sent in at her visit  Her diabetes was ok.  I will mail copy of labs.

## 2024-11-21 ENCOUNTER — CLINICAL SUPPORT (OUTPATIENT)
Dept: ENDOSCOPY | Facility: HOSPITAL | Age: 79
End: 2024-11-21
Attending: INTERNAL MEDICINE
Payer: MEDICARE

## 2024-11-21 ENCOUNTER — TELEPHONE (OUTPATIENT)
Dept: ENDOSCOPY | Facility: HOSPITAL | Age: 79
End: 2024-11-21

## 2024-11-21 DIAGNOSIS — E61.1 IRON DEFICIENCY: ICD-10-CM

## 2024-11-21 DIAGNOSIS — E61.1 IRON DEFICIENCY: Primary | ICD-10-CM

## 2024-11-21 NOTE — TELEPHONE ENCOUNTER
Contacted the patient to schedule an endoscopy procedure(s) EGD and Colonoscopy. The patient did not answer the call and left a voice message requesting a call back. New PAT appt made.

## 2024-11-23 ENCOUNTER — CLINICAL SUPPORT (OUTPATIENT)
Dept: ENDOSCOPY | Facility: HOSPITAL | Age: 79
End: 2024-11-23
Attending: INTERNAL MEDICINE
Payer: MEDICARE

## 2024-11-23 DIAGNOSIS — E61.1 IRON DEFICIENCY: ICD-10-CM

## 2024-11-23 NOTE — PLAN OF CARE
"Attempted to contact patient for PAT appointment to schedule colonoscopy. No answer, no voicemail, and no [facility] portal. Mailed "unable to reach" letter to patient.   "

## 2024-12-30 ENCOUNTER — TELEPHONE (OUTPATIENT)
Dept: ENDOSCOPY | Facility: HOSPITAL | Age: 79
End: 2024-12-30
Payer: MEDICARE

## 2024-12-31 ENCOUNTER — TELEPHONE (OUTPATIENT)
Dept: ENDOSCOPY | Facility: HOSPITAL | Age: 79
End: 2024-12-31
Payer: MEDICARE

## 2025-01-03 ENCOUNTER — TELEPHONE (OUTPATIENT)
Dept: ENDOSCOPY | Facility: HOSPITAL | Age: 80
End: 2025-01-03
Payer: MEDICARE

## 2025-01-03 NOTE — TELEPHONE ENCOUNTER
Per Endoscopy protocol, 2 attempts were made to contact the Pt to schedule their pre admit testing appointment (PAT), Pt did not return call to schedule PAT. Referral completed, letter sent to Pt.             Endoscopy Scheduling Department  [facility] [address] [place] [address]  Take the [facility] Elevators to 4th Floor Endoscopy Lab      Date: 01/03/2025      Medical Record # [id number]      Dear Ramya Mayo    A referral for the following procedure(s) Colonoscopy was placed for you by   Kameron Donato MD .    Since multiple attempts have been made to get in touch with you to schedule your pre admit testing appointment (PAT), this is the last notification. Please contact your ordering provider for new referral if you are still interested in scheduling your endoscopy procedure.     If you have already scheduled this appointment, please disregard this letter.     Sincerely,        Endoscopy Scheduling Department [phone]        Comments: Office hours are Monday through Friday 8-430p.

## 2025-01-28 NOTE — PROGRESS NOTES
This note was created by combination of typed  and M-Modal dictation.  Transcription errors may be present.   This note was also generated with the assistance of ambient listening technology. Verbal consent was obtained by the patient and accompanying visitor(s) for the recording of patient appointment to facilitate this note. I attest to having reviewed and edited the generated note for accuracy, though some syntax or spelling errors may persist. Please contact the author of this note for any clarification.    Assessment and Plan:   Assessment and Plan    Type 2 diabetes mellitus without complication, without long-term current use of insulin; januvia expensive;1/2020 actos  Diabetes mellitus type 2 without retinopathy  -check labs on actos, metformin, glipizide.  A1c goal < 8.0    Iron deficiency anemia, unspecified iron deficiency anemia type  - she does not want to be.  Has not noted gross blood in stool.  Notes dark stools she attributes to the iron supplement.  Taking iron regularly without side effects.  We talked about risks of undiagnosed peptic ulcer disease, growth/tumors, she voiced understanding but does not want to pursue if she remains iron deficient she would rather Pursue oral iron supplement rather than pursue further testing.  -     CBC Without Differential; Future; Expected date: 04/28/2025  -     Reticulocytes; Future; Expected date: 04/28/2025  -     Ferritin; Future; Expected date: 04/28/2025  -     Iron and TIBC; Future; Expected date: 04/28/2025  -     Ferritin; Future; Expected date: 01/30/2025  -     Ferritin; Future; Expected date: 04/29/2025    Essential hypertension  - BP today stable.  On amlodipine, losartan, Toprol-XL    Dyslipidemia 7/2018 changed prava to atorva  - check labs on atorvastatin    Nonrheumatic aortic valve stenosis mod severe monitor q6 months  - overdue for follow-up with Cardiology.  I previously ordered echo she did not get it done.  Our staff to assist in  arranging follow up with allergy and I will reach out to Cardiology staff to assist in arranging prior to her visit.     She declines vaccinations today including flu shot    Visit today included increased complexity associated with the care of the episodic problem addressed and managing the longitudinal care of the patient due to the serious and/or complex managed problem(s).      Medications Discontinued During This Encounter   Medication Reason    traZODone (DESYREL) 50 MG tablet Therapy completed       meds sent this encounter:         Follow Up:   Follow-up 3 months.  Plan for repeat labs at that time  Future Appointments   Date Time Provider Department Center   2025 10:40 AM Kameron Donato MD [facility]         Subjective:   Subjective   Chief Complaint   Patient presents with    Follow-up       HPI  Ramya is a 79 y.o. female.    Social History     Tobacco Use    Smoking status: Never    Smokeless tobacco: Never   Substance Use Topics    Alcohol use: No          Social History     Social History Narrative    Lives in [place].    2014.         Patient Care Team:  Kameron Donato MD as PCP - General (Internal Medicine)  Tessa Baltazar OD as Consulting Physician (Optometry)    No LMP recorded. Patient has had a hysterectomy.    Last appointment with this clinic was 2024. Last visit with me 2024   To summarize last visit and events leading up to today:  Diabetes with peripheral artery disease.  A1c went up.  History of Rybelsus but not able to use consistent because of issues with the donut hole.  I started her on pioglitazone.  Stay on glipizide, metformin, increase pioglitazone  Hypertension borderline readings last visit   Hyperlipidemia triglycerides high, non HDL good.  Statin.    Aortic valve stenosis moderate severe, followed by Cardiology.  Plan is routine follow-up monitoring with echo  Hair loss, iron deficiency, restless legs, last ferritin check was less  than 50.  Last visit not taking dedicated iron supplement.  I sent in a prescription.  No longer taking ropinirole.  Microcytic anemia suspicious for thalassemia.       Cardiology appointment and Cardiology echo canceled      Last visit with me 09/2023   Diabetes stable on metformin Actos and glipizide.  History of Rybelsus, when she gets a donut hole it is too expensive.  Needs to follow up with Cardiology regarding aortic stenosis     03/25/2024 labs  CMP normal   CBC microcytic anemia stable   Ferritin low 12  A1c 6.9     She had a follow up appointment with me 04/01/2024, she canceled     I had ordered EGD and colonoscopy  Intake phone call 06/12/2024 canceled      Last visit with me 09/04/2024   Iron-deficiency, never had a colonoscopy.  She has never had 1 and she is wary.  Start iron.  Referred for GI scheduling   Aortic valve stenosis overdue for cardiology follow-up.  Ordered echo in anticipation of follow-up   Diabetes reportedly taking metformin Januvia glipizide.  Check A1c though maybe false low because of iron-deficiency anemia   Hypertension stable   Allergic conjunctivitis trial of Patanol    CMP WNL  A1c 7.4 from 6.9  CBC anemia though improved compared to previous  Ferritin slightly better compared to previous  Iron sat low  Retic count normal  Microalbuminuria new     Discussed at [facility] - start iron  Has EGD/colonoscopy intake call scheduled.     Repeat microalb with next labs.      GI made Several attempts to schedule colonoscopy  but were not able to reach the patient.     Had cardiology appointment 01/09/2025, canceled    Today's visit:    History of Present Illness    HPI:  Ramya reports low iron levels and blood counts from a previous visit and has been taking an iron supplement as prescribed. She notes darkening of stools since starting the supplement. She reports fatigue but denies chest pain. Ramya has a known heart murmur, specifically an aortic valve with mild stiffness. It has been  nearly 2 years since her last cardiology appointment. She denies symptoms related to her heart condition such as lightheadedness, chest pain, or irregular heartbeats.    Ramya denies pain, hematochezia, or melena. She also denies any side effects from the iron supplement such as constipation, abdominal pain, or cramping.    MEDICATIONS:  - Atorvastatin, for cholesterol  - Pioglitazone (Actos), for blood sugar  - Amlodipine, for blood pressure  - Losartan, for blood pressure  - Metoprolol, for blood pressure  - Glipizide, for blood sugar  - Metformin, for blood sugar  - Iron supplement, for low iron/blood counts    ROS:  General: reports fatigue  Cardiovascular: no chest pain, no palpitations  Gastrointestinal: no abdominal pain, no blood in stool, reports change in bowel habits  Neurological: no lightheadedness             ALLERGIES AND MEDICATIONS: updated and reviewed.  Medication List with Changes/Refills   Current Medications    AMLODIPINE (NORVASC) 10 MG TABLET    Take 1 tablet (10 mg total) by mouth once daily.    ATORVASTATIN (LIPITOR) 40 MG TABLET    Take 1 tablet (40 mg total) by mouth once daily.    BLOOD SUGAR DIAGNOSTIC (TRUE METRIX GLUCOSE TEST STRIP) STRP    Daily glucose testing; whichever brand covered by insurance.    FERROUS SULFATE (FEOSOL) 325 MG (65 MG IRON) TAB TABLET    Take 1 tablet (325 mg total) by mouth daily with breakfast.    GLIPIZIDE (GLUCOTROL) 10 MG TR24    Take 1 tablet (10 mg total) by mouth daily with breakfast.    LANCETS (ONETOUCH DELICA LANCETS) 33 GAUGE MISC    1 lancet  by Misc.(Non-Drug; Combo Route) route once daily. ONCE DAILY BLOOD SUGAR TESTING; WHICHEVER BRAND COVERED BY INSURANCE    LOSARTAN (COZAAR) 100 MG TABLET    Take 1 tablet (100 mg total) by mouth once daily.    METFORMIN (GLUCOPHAGE-XR) 500 MG ER 24HR TABLET    Take 2 tablets (1,000 mg total) by mouth 2 (two) times daily with meals.    METOPROLOL SUCCINATE (TOPROL-XL) 100 MG 24 HR TABLET    Take 1 tablet  "(100 mg total) by mouth once daily.    OLOPATADINE (PATANOL) 0.1 % OPHTHALMIC SOLUTION    Place 1 drop into both eyes 2 (two) times daily.    PIOGLITAZONE (ACTOS) 30 MG TABLET    Take 1 tablet (30 mg total) by mouth once daily.    TRAZODONE (DESYREL) 50 MG TABLET    Take 0.5 tablets (25 mg total) by mouth nightly as needed for Insomnia.         Objective:   Objective   Physical Exam   Vitals:    01/29/25 1027   BP: 134/66   Pulse: 76   Temp: 98.1 °F (36.7 °C)   TempSrc: Oral   SpO2: 98%   Weight: 66.4 kg (146 lb 7.9 oz)   Height: 5' 3" (1.6 m)    Body mass index is 25.95 kg/m².  Weight: 66.4 kg (146 lb 7.9 oz)   Height: 5' 3" (160 cm)     Physical Exam  Constitutional:       General: She is not in acute distress.     Appearance: She is well-developed.   Eyes:      Extraocular Movements: Extraocular movements intact.   Cardiovascular:      Rate and Rhythm: Normal rate and regular rhythm.      Heart sounds: Murmur heard.      Comments:  3/6 crescendo decrescendo murmur  Pulmonary:      Effort: Pulmonary effort is normal.      Breath sounds: Normal breath sounds.   Musculoskeletal:         General: Normal range of motion.      Right lower leg: No edema.      Left lower leg: No edema.   Skin:     General: Skin is warm and dry.   Neurological:      Mental Status: She is alert and oriented to person, place, and time.      Coordination: Coordination normal.   Psychiatric:         Behavior: Behavior normal.         Thought Content: Thought content normal.                "

## 2025-01-29 ENCOUNTER — LAB VISIT (OUTPATIENT)
Dept: LAB | Facility: HOSPITAL | Age: 80
End: 2025-01-29
Attending: INTERNAL MEDICINE
Payer: MEDICARE

## 2025-01-29 ENCOUNTER — OFFICE VISIT (OUTPATIENT)
Dept: FAMILY MEDICINE | Facility: CLINIC | Age: 80
End: 2025-01-29
Payer: MEDICARE

## 2025-01-29 VITALS
TEMPERATURE: 98 F | OXYGEN SATURATION: 98 % | DIASTOLIC BLOOD PRESSURE: 66 MMHG | BODY MASS INDEX: 25.96 KG/M2 | HEIGHT: 63 IN | SYSTOLIC BLOOD PRESSURE: 134 MMHG | HEART RATE: 76 BPM | WEIGHT: 146.5 LBS

## 2025-01-29 DIAGNOSIS — E78.5 DYSLIPIDEMIA: ICD-10-CM

## 2025-01-29 DIAGNOSIS — D50.9 IRON DEFICIENCY ANEMIA, UNSPECIFIED IRON DEFICIENCY ANEMIA TYPE: ICD-10-CM

## 2025-01-29 DIAGNOSIS — E11.9 TYPE 2 DIABETES MELLITUS WITHOUT COMPLICATION, WITHOUT LONG-TERM CURRENT USE OF INSULIN: ICD-10-CM

## 2025-01-29 DIAGNOSIS — E11.9 TYPE 2 DIABETES MELLITUS WITHOUT COMPLICATION, WITHOUT LONG-TERM CURRENT USE OF INSULIN: Primary | ICD-10-CM

## 2025-01-29 DIAGNOSIS — E11.9 DIABETES MELLITUS TYPE 2 WITHOUT RETINOPATHY: ICD-10-CM

## 2025-01-29 DIAGNOSIS — I10 ESSENTIAL HYPERTENSION: ICD-10-CM

## 2025-01-29 DIAGNOSIS — I35.0 NONRHEUMATIC AORTIC VALVE STENOSIS: ICD-10-CM

## 2025-01-29 LAB
ALBUMIN SERPL BCP-MCNC: 4 G/DL (ref 3.5–5.2)
ALP SERPL-CCNC: 102 U/L (ref 40–150)
ALT SERPL W/O P-5'-P-CCNC: 23 U/L (ref 10–44)
ANION GAP SERPL CALC-SCNC: 12 MMOL/L (ref 8–16)
AST SERPL-CCNC: 26 U/L (ref 10–40)
BILIRUB SERPL-MCNC: 0.8 MG/DL (ref 0.1–1)
BUN SERPL-MCNC: 16 MG/DL (ref 8–23)
CALCIUM SERPL-MCNC: 10 MG/DL (ref 8.7–10.5)
CHLORIDE SERPL-SCNC: 103 MMOL/L (ref 95–110)
CHOLEST SERPL-MCNC: 159 MG/DL (ref 120–199)
CHOLEST/HDLC SERPL: 2.9 {RATIO} (ref 2–5)
CO2 SERPL-SCNC: 23 MMOL/L (ref 23–29)
CREAT SERPL-MCNC: 0.8 MG/DL (ref 0.5–1.4)
ERYTHROCYTE [DISTWIDTH] IN BLOOD BY AUTOMATED COUNT: 14.8 % (ref 11.5–14.5)
EST. GFR  (NO RACE VARIABLE): >60 ML/MIN/1.73 M^2
ESTIMATED AVG GLUCOSE: 154 MG/DL (ref 68–131)
FERRITIN SERPL-MCNC: 68 NG/ML (ref 20–300)
GLUCOSE SERPL-MCNC: 179 MG/DL (ref 70–110)
HBA1C MFR BLD: 7 % (ref 4–5.6)
HCT VFR BLD AUTO: 40.7 % (ref 37–48.5)
HDLC SERPL-MCNC: 55 MG/DL (ref 40–75)
HDLC SERPL: 34.6 % (ref 20–50)
HGB BLD-MCNC: 12.2 G/DL (ref 12–16)
LDLC SERPL CALC-MCNC: 81.6 MG/DL (ref 63–159)
MCH RBC QN AUTO: 21.4 PG (ref 27–31)
MCHC RBC AUTO-ENTMCNC: 30 G/DL (ref 32–36)
MCV RBC AUTO: 71 FL (ref 82–98)
NONHDLC SERPL-MCNC: 104 MG/DL
PLATELET # BLD AUTO: 276 K/UL (ref 150–450)
PMV BLD AUTO: 10.8 FL (ref 9.2–12.9)
POTASSIUM SERPL-SCNC: 4.2 MMOL/L (ref 3.5–5.1)
PROT SERPL-MCNC: 8.1 G/DL (ref 6–8.4)
RBC # BLD AUTO: 5.7 M/UL (ref 4–5.4)
SODIUM SERPL-SCNC: 138 MMOL/L (ref 136–145)
TRIGL SERPL-MCNC: 112 MG/DL (ref 30–150)
WBC # BLD AUTO: 7.81 K/UL (ref 3.9–12.7)

## 2025-01-29 PROCEDURE — 99999 PR PBB SHADOW E&M-EST. PATIENT-LVL III: CPT | Mod: PBBFAC,,, | Performed by: INTERNAL MEDICINE

## 2025-01-29 PROCEDURE — 1101F PT FALLS ASSESS-DOCD LE1/YR: CPT | Mod: CPTII,S$GLB,, | Performed by: INTERNAL MEDICINE

## 2025-01-29 PROCEDURE — 1159F MED LIST DOCD IN RCRD: CPT | Mod: CPTII,S$GLB,, | Performed by: INTERNAL MEDICINE

## 2025-01-29 PROCEDURE — 1160F RVW MEDS BY RX/DR IN RCRD: CPT | Mod: CPTII,S$GLB,, | Performed by: INTERNAL MEDICINE

## 2025-01-29 PROCEDURE — 82728 ASSAY OF FERRITIN: CPT | Performed by: INTERNAL MEDICINE

## 2025-01-29 PROCEDURE — 36415 COLL VENOUS BLD VENIPUNCTURE: CPT | Mod: PO | Performed by: INTERNAL MEDICINE

## 2025-01-29 PROCEDURE — 80061 LIPID PANEL: CPT | Performed by: INTERNAL MEDICINE

## 2025-01-29 PROCEDURE — 80053 COMPREHEN METABOLIC PANEL: CPT | Performed by: INTERNAL MEDICINE

## 2025-01-29 PROCEDURE — 3288F FALL RISK ASSESSMENT DOCD: CPT | Mod: CPTII,S$GLB,, | Performed by: INTERNAL MEDICINE

## 2025-01-29 PROCEDURE — 1126F AMNT PAIN NOTED NONE PRSNT: CPT | Mod: CPTII,S$GLB,, | Performed by: INTERNAL MEDICINE

## 2025-01-29 PROCEDURE — 3078F DIAST BP <80 MM HG: CPT | Mod: CPTII,S$GLB,, | Performed by: INTERNAL MEDICINE

## 2025-01-29 PROCEDURE — 3075F SYST BP GE 130 - 139MM HG: CPT | Mod: CPTII,S$GLB,, | Performed by: INTERNAL MEDICINE

## 2025-01-29 PROCEDURE — 85027 COMPLETE CBC AUTOMATED: CPT | Performed by: INTERNAL MEDICINE

## 2025-01-29 PROCEDURE — 83036 HEMOGLOBIN GLYCOSYLATED A1C: CPT | Performed by: INTERNAL MEDICINE

## 2025-01-29 PROCEDURE — 99214 OFFICE O/P EST MOD 30 MIN: CPT | Mod: S$GLB,,, | Performed by: INTERNAL MEDICINE

## 2025-01-29 PROCEDURE — G2211 COMPLEX E/M VISIT ADD ON: HCPCS | Mod: S$GLB,,, | Performed by: INTERNAL MEDICINE

## 2025-01-30 NOTE — PROGRESS NOTES
A1c 7.0 from previous 7.4   CMP normal   CBC anemia improving hemoglobin normal MCV improving with likely underlying thalassemia   Ferritin improving 68 from previous 24    Lipid good    Results to pt. No changes.  Follow up 3 months. If iron continues to improve, stop when ferritin close to 100

## 2025-03-14 ENCOUNTER — OFFICE VISIT (OUTPATIENT)
Dept: CARDIOLOGY | Facility: CLINIC | Age: 80
End: 2025-03-14
Payer: MEDICARE

## 2025-03-14 VITALS
HEART RATE: 111 BPM | OXYGEN SATURATION: 98 % | SYSTOLIC BLOOD PRESSURE: 158 MMHG | DIASTOLIC BLOOD PRESSURE: 76 MMHG | RESPIRATION RATE: 15 BRPM | WEIGHT: 151.25 LBS | BODY MASS INDEX: 26.8 KG/M2 | HEIGHT: 63 IN

## 2025-03-14 DIAGNOSIS — I69.30 H/O: STROKE WITH RESIDUAL EFFECTS: ICD-10-CM

## 2025-03-14 DIAGNOSIS — I10 ESSENTIAL HYPERTENSION: ICD-10-CM

## 2025-03-14 DIAGNOSIS — E78.5 DYSLIPIDEMIA: ICD-10-CM

## 2025-03-14 DIAGNOSIS — I35.0 AORTIC VALVE STENOSIS, ETIOLOGY OF CARDIAC VALVE DISEASE UNSPECIFIED: Primary | ICD-10-CM

## 2025-03-14 PROCEDURE — 99999 PR PBB SHADOW E&M-EST. PATIENT-LVL IV: CPT | Mod: PBBFAC,,, | Performed by: INTERNAL MEDICINE

## 2025-03-14 NOTE — PROGRESS NOTES
CARDIOVASCULAR CONSULTATION    REASON FOR CONSULT:   Ramya Mayo is a 79 y.o. female who presents for aortic stenosis    HISTORY OF PRESENT ILLNESS:     Patient is a pleasant 77-year-old female who is here for follow-up of her aortic stenosis.  Main complaint is dyspnea on exertion which she started noticing a few months ago.  Denies any chest pains or tightness.  Per echo done in 2018 had mild aortic stenosis.      CONCLUSIONS     1 - Hyperdynamic left ventricular systolic function (EF >70%).     2 - Trivial to mild aortic stenosis, LUISANA = 1.7 cm2, AVAi = 0.98 cm2/m2, peak velocity = 2.42 m/s, mean gradient = 12 mmHg.     3 - Trivial mitral regurgitation.     4 - Trivial tricuspid regurgitation.     5 - Trivial pulmonic regurgitation.        Notes from March 2023: Patient states that she has been doing fine.  Denies any chest pains at rest on exertion, orthopnea, PND.  Can walk about half a mi without any shortness of breath.  Echo showed moderate to severe aortic stenosis.  Stress test did not show any significant ischemia.      The left ventricle is normal in size with normal systolic function.  The estimated ejection fraction is 65%.  Grade I left ventricular diastolic dysfunction.  There is moderate-to-severe aortic valve stenosis.  Aortic valve area is 1.04 cm2; peak velocity is 3.53 m/s; mean gradient is 30 mmHg.  Severe left atrial enlargement.  Mild mitral regurgitation.  Normal right ventricular size with normal right ventricular systolic function.  Mild right atrial enlargement.  Normal central venous pressure (3 mmHg).  The estimated PA systolic pressure is 30 mmHg.        Normal myocardial perfusion scan. There is no evidence of myocardial ischemia or infarction.    There is a mild intensity perfusion abnormality in the anterior wall of the left ventricle, secondary to breast attenuation.    The gated perfusion images showed an ejection fraction of 92% at rest.    There is normal wall motion at  rest and post stress.    The ECG portion of the study is negative for ischemia.    The patient reported no chest pain during the stress test.    There were no arrhythmias during stress.    Notes from :      History of Present Illness    CHIEF COMPLAINT:  Ramya presents today for follow up of severe aortic stenosis.    CARDIOVASCULAR:  She has moderate to severe aortic stenosis found on echocardiogram in 2023. Stress test in  was normal with no blockages. She denies chest pain, tightness, or shortness of breath. She reports elevated blood pressure today due to anxiety and rushing through traffic and fog to get to the appointment.    MEDICAL HISTORY:  She has a history of stroke and vision in only one eye.    EXERCISE:  She does not engage in walking for exercise but uses the stairs at home, which she describes as high steps, as her primary form of physical activity.    MEDICATIONS:  She takes amlodipine, losartan, and metoprolol for hypertension, atorvastatin for hypercholesterolemia, and iron supplementation for anemia. Her diabetes medications are being managed by Dr. Stewart. Aspirin was discontinued by Dr. Sexton.    ALLERGIES:  She has indoor and outdoor allergies.         PAST MEDICAL HISTORY:     Past Medical History:   Diagnosis Date    Diabetes mellitus type II     Hyperlipidemia     Hypertension     Nuclear sclerosis of both eyes 2018    Stroke        PAST SURGICAL HISTORY:     Past Surgical History:   Procedure Laterality Date    4 MISCARRIAGES       8 PARA 4      HYSTERECTOMY      laser right eye  Right ? yrs    pt had stroke and tried to bring vision back        ALLERGIES AND MEDICATION:     Review of patient's allergies indicates:   Allergen Reactions    Citalopram analogues Other (See Comments)     Hallucinations        Medication List            Accurate as of 2025 10:13 AM. If you have any questions, ask your nurse or doctor.                CONTINUE taking  these medications      amLODIPine 10 MG tablet  Commonly known as: NORVASC  Take 1 tablet (10 mg total) by mouth once daily.     atorvastatin 40 MG tablet  Commonly known as: LIPITOR  Take 1 tablet (40 mg total) by mouth once daily.     blood sugar diagnostic Strp  Commonly known as: TRUE METRIX GLUCOSE TEST STRIP  Daily glucose testing; whichever brand covered by insurance.     ferrous sulfate 325 mg (65 mg iron) Tab tablet  Commonly known as: FEOSOL  Take 1 tablet (325 mg total) by mouth daily with breakfast.     glipiZIDE 10 MG Tr24  Commonly known as: GLUCOTROL  Take 1 tablet (10 mg total) by mouth daily with breakfast.     lancets 33 gauge Misc  Commonly known as: ONETOUCH DELICA LANCETS  1 lancet  by Misc.(Non-Drug; Combo Route) route once daily. ONCE DAILY BLOOD SUGAR TESTING; WHICHEVER BRAND COVERED BY INSURANCE     losartan 100 MG tablet  Commonly known as: COZAAR  Take 1 tablet (100 mg total) by mouth once daily.     metFORMIN 500 MG ER 24hr tablet  Commonly known as: GLUCOPHAGE-XR  Take 2 tablets (1,000 mg total) by mouth 2 (two) times daily with meals.     metoprolol succinate 100 MG 24 hr tablet  Commonly known as: TOPROL-XL  Take 1 tablet (100 mg total) by mouth once daily.     olopatadine 0.1 % ophthalmic solution  Commonly known as: PATANOL  Place 1 drop into both eyes 2 (two) times daily.     pioglitazone 30 MG tablet  Commonly known as: ACTOS  Take 1 tablet (30 mg total) by mouth once daily.              SOCIAL HISTORY:     Social History     Socioeconomic History    Marital status:    Tobacco Use    Smoking status: Never    Smokeless tobacco: Never   Substance and Sexual Activity    Alcohol use: No    Drug use: No    Sexual activity: Not Currently   Social History Narrative    Lives in [place].    2014.         FAMILY HISTORY:     Family History   Problem Relation Name Age of Onset    Diabetes Mother      Hypertension Mother      Kidney disease Mother      Vision loss  "Father      Cancer Father      Glaucoma Father      Heart disease Son      Hyperlipidemia Son      No Known Problems Sister      Prostate cancer Brother      No Known Problems Maternal Aunt      No Known Problems Maternal Uncle      No Known Problems Paternal Aunt      No Known Problems Paternal Uncle      No Known Problems Maternal Grandmother      No Known Problems Maternal Grandfather      No Known Problems Paternal Grandmother      No Known Problems Paternal Grandfather      Amblyopia Neg Hx      Blindness Neg Hx      Cataracts Neg Hx      Macular degeneration Neg Hx      Retinal detachment Neg Hx      Strabismus Neg Hx      Stroke Neg Hx      Thyroid disease Neg Hx         REVIEW OF SYSTEMS:   Review of Systems   Constitutional: Negative.   HENT: Negative.     Eyes: Negative.    Respiratory: Negative.     Endocrine: Negative.    Hematologic/Lymphatic: Negative.    Skin: Negative.    Musculoskeletal: Negative.    Gastrointestinal: Negative.    Genitourinary: Negative.    Neurological: Negative.    Psychiatric/Behavioral: Negative.     Allergic/Immunologic: Negative.        A 10 point review of systems was performed and all the pertinent positives have been mentioned. Rest of review of systems was negative.        PHYSICAL EXAM:     Vitals:    03/14/25 0851   BP: (!) 158/76   Pulse: (!) 111   Resp: 15    Body mass index is 26.79 kg/m².  Weight: 68.6 kg (151 lb 3.8 oz)   Height: 5' 3" (160 cm)     Physical Exam  Constitutional:       Appearance: Normal appearance. She is well-developed.   HENT:      Head: Normocephalic.   Eyes:      Pupils: Pupils are equal, round, and reactive to light.   Cardiovascular:      Rate and Rhythm: Normal rate and regular rhythm.      Heart sounds: Murmur heard.   Pulmonary:      Effort: Pulmonary effort is normal.      Breath sounds: Normal breath sounds.   Abdominal:      General: Bowel sounds are normal.      Palpations: Abdomen is soft.      Tenderness: There is no abdominal " tenderness.   Musculoskeletal:         General: Normal range of motion.      Cervical back: Normal range of motion and neck supple.   Skin:     General: Skin is warm.   Neurological:      Mental Status: She is alert and oriented to person, place, and time.           DATA:     Laboratory:  CBC:  Recent Labs   Lab 03/25/24  0857 09/04/24  1449 01/29/25  1119   WBC 7.95 8.14 7.81   Hemoglobin 10.2 L 11.1 L 12.2   Hematocrit 35.6 L 39.1 40.7   Platelets 328 301 276       CHEMISTRIES:  Recent Labs   Lab 05/17/22  0755 09/21/22  0925 03/25/24  0857 09/04/24  1449 01/29/25  1119   Glucose 209 H   < > 149 H 133 H 179 H   Sodium 137   < > 141 138 138   Potassium 4.1   < > 4.5 3.7 4.2   BUN 13   < > 18 13 16   Creatinine 0.8   < > 0.9 0.9 0.8   eGFR if  >60.0  --   --   --   --    eGFR if non  >60.0  --   --   --   --    Calcium 9.4   < > 10.1 9.9 10.0    < > = values in this interval not displayed.       CARDIAC BIOMARKERS:        COAGS:        LIPIDS/LFTS:  Recent Labs   Lab 06/12/23  0925 09/21/23  0933 03/25/24  0857 09/04/24  1449 01/29/25  1119   Cholesterol 153  --  156  --  159   Triglycerides 225 H  --  115  --  112   HDL 50  --  54  --  55   LDL Cholesterol 58.0 L  --  79.0  --  81.6   Non-HDL Cholesterol 103  --  102  --  104   AST 30   < > 22 29 26   ALT 27   < > 14 24 23    < > = values in this interval not displayed.       Hemoglobin A1C   Date Value Ref Range Status   01/29/2025 7.0 (H) 4.0 - 5.6 % Final     Comment:     ADA Screening Guidelines:  5.7-6.4%  Consistent with prediabetes  >or=6.5%  Consistent with diabetes    High levels of fetal hemoglobin interfere with the HbA1C  assay. Heterozygous hemoglobin variants (HbS, HgC, etc)do  not significantly interfere with this assay.   However, presence of multiple variants may affect accuracy.     09/04/2024 7.4 (H) 4.0 - 5.6 % Final     Comment:     ADA Screening Guidelines:  5.7-6.4%  Consistent with prediabetes  >or=6.5%   Consistent with diabetes    High levels of fetal hemoglobin interfere with the HbA1C  assay. Heterozygous hemoglobin variants (HbS, HgC, etc)do  not significantly interfere with this assay.   However, presence of multiple variants may affect accuracy.     03/25/2024 6.9 (H) 4.0 - 5.6 % Final     Comment:     ADA Screening Guidelines:  5.7-6.4%  Consistent with prediabetes  >or=6.5%  Consistent with diabetes    High levels of fetal hemoglobin interfere with the HbA1C  assay. Heterozygous hemoglobin variants (HbS, HgC, etc)do  not significantly interfere with this assay.   However, presence of multiple variants may affect accuracy.         TSH        The ASCVD Risk score (Johnna DK, et al., 2019) failed to calculate for the following reasons:    The 2019 ASCVD risk score is only valid for ages 40 to 79    Risk score cannot be calculated because patient has a medical history suggesting prior/existing ASCVD             ASSESSMENT AND PLAN     Patient Active Problem List   Diagnosis    Dyslipidemia 7/2018 changed prava to atorva    Essential hypertension    Type 2 diabetes mellitus without complication, without long-term current use of insulin; januvia expensive;1/2020 actos    H/O: stroke with residual effects    Diabetes mellitus type 2 without retinopathy    Nuclear sclerosis of both eyes    Refractive error    Nonrheumatic aortic valve stenosis mod severe monitor q6 months    RLS (restless legs syndrome)    Iron deficiency anemia    Calculus of gallbladder without cholecystitis without obstruction incidental on RUQ US 1/2021    Elevated alkaline phosphatase level, liver fraction, 1/2021 RUQ US normal except incidental gallstones    Aortic valve stenosis     Assessment & Plan    IMPRESSION:  Elevated blood pressure of 150/78 and 158/76, likely due to nervousness and rushing to appointment.  Patient states blood pressure at home stays well controlled.  History of significant aortic stenosis, with previous  echocardiogram in January 2023 showing moderate to significant aortic stenosis.  Auscultated heart murmur consistent with aortic stenosis.    PLAN SUMMARY:  - Order echocardiogram to reassess aortic stenosis severity  - Ramya to wait for scheduling at [facility]  - Discuss aspirin therapy for stroke prevention with Dr. Stewart  - Follow-up after echocardiogram results are available    AORTIC STENOSIS:  - Educated patient on the importance of monitoring aortic stenosis to prevent progression to heart failure.  - Ordered echocardiogram to reassess aortic stenosis severity.  - Ramya to wait for scheduling at [facility].    STROKE PREVENTION:  - Explained the necessity of aspirin therapy for stroke prevention because of history of stroke noted in the chart, advising patient to discuss with Dr. Donato.  It was discontinued by him because of anemia    Dyslipidemia:  On statin    Lab Results   Component Value Date    LDLCALC 81.6 01/29/2025         FOLLOW-UP:  - Follow up after echocardiogram results are available.               Thank you very much for involving me in the care of your patient.  Please do not hesitate to contact me if there are any questions.      Roxanna Mcpherson MD, FACC, [facility]  Interventional Cardiologist, [facility].       Visit today included increased complexity associated with the care of the episodic problem aortic stenosis, dyslipidemia addressed and managing the longitudinal care of the patient due to the serious and/or complex managed problem(s) Problem List[1]  .    This note was dictated with the help of speech recognition software.  There might be un-intended errors and/or substitutions.                         [1]   Patient Active Problem List  Diagnosis    Dyslipidemia 7/2018 changed prava to atorva    Essential hypertension    Type 2 diabetes mellitus without complication, without long-term current use of insulin; januvia expensive;1/2020 actos    H/O: stroke with residual effects     Diabetes mellitus type 2 without retinopathy    Nuclear sclerosis of both eyes    Refractive error    Nonrheumatic aortic valve stenosis mod severe monitor q6 months    RLS (restless legs syndrome)    Iron deficiency anemia    Calculus of gallbladder without cholecystitis without obstruction incidental on RUQ US 1/2021    Elevated alkaline phosphatase level, liver fraction, 1/2021 RUQ US normal except incidental gallstones    Aortic valve stenosis

## 2025-03-24 DIAGNOSIS — Z00.00 ENCOUNTER FOR MEDICARE ANNUAL WELLNESS EXAM: ICD-10-CM

## 2025-03-28 ENCOUNTER — HOSPITAL ENCOUNTER (OUTPATIENT)
Dept: CARDIOLOGY | Facility: HOSPITAL | Age: 80
Discharge: HOME | End: 2025-03-28
Attending: INTERNAL MEDICINE
Payer: MEDICARE

## 2025-03-28 VITALS — HEIGHT: 63 IN | WEIGHT: 151.25 LBS | BODY MASS INDEX: 26.8 KG/M2

## 2025-03-28 DIAGNOSIS — I35.0 AORTIC VALVE STENOSIS, ETIOLOGY OF CARDIAC VALVE DISEASE UNSPECIFIED: ICD-10-CM

## 2025-03-28 PROCEDURE — 93306 TTE W/DOPPLER COMPLETE: CPT | Mod: 26,,, | Performed by: INTERNAL MEDICINE

## 2025-03-28 PROCEDURE — 93306 TTE W/DOPPLER COMPLETE: CPT

## 2025-03-29 LAB
AORTIC ROOT ANNULUS: 2.88 CM
AORTIC VALVE CUSP SEPERATION: 0.63 CM
ASCENDING AORTA: 2.34 CM
AV INDEX (PROSTH): 0.27
AV MEAN GRADIENT: 45 MMHG
AV PEAK GRADIENT: 74 MMHG
AV VALVE AREA BY VELOCITY RATIO: 0.8 CM²
AV VALVE AREA: 0.8 CM²
AV VELOCITY RATIO: 0.26
BSA FOR ECHO PROCEDURE: 1.75 M2
CV ECHO LV RWT: 0.56 CM
DOP CALC AO PEAK VEL: 4.3 M/S
DOP CALC AO VTI: 102 CM
DOP CALC LVOT AREA: 3.1 CM2
DOP CALC LVOT DIAMETER: 2 CM
DOP CALC LVOT PEAK VEL: 1.1 M/S
DOP CALC LVOT STROKE VOLUME: 85.4 CM3
DOP CALC MV VTI: 40.6 CM
DOP CALCLVOT PEAK VEL VTI: 27.2 CM
E WAVE DECELERATION TIME: 293 MSEC
E/A RATIO: 0.77
E/E' RATIO: 19 M/S
ECHO LV POSTERIOR WALL: 1.1 CM (ref 0.6–1.1)
FRACTIONAL SHORTENING: 35.9 % (ref 28–44)
INTERVENTRICULAR SEPTUM: 1.1 CM (ref 0.6–1.1)
IVC DIAMETER: 1.52 CM
LA MAJOR: 5.7 CM
LA MINOR: 5.8 CM
LA WIDTH: 4 CM
LEFT ATRIUM SIZE: 4.2 CM
LEFT ATRIUM VOLUME INDEX: 48 ML/M2
LEFT ATRIUM VOLUME: 82 CM3
LEFT INTERNAL DIMENSION IN SYSTOLE: 2.5 CM (ref 2.1–4)
LEFT VENTRICLE DIASTOLIC VOLUME INDEX: 38.37 ML/M2
LEFT VENTRICLE DIASTOLIC VOLUME: 66 ML
LEFT VENTRICLE MASS INDEX: 81.5 G/M2
LEFT VENTRICLE SYSTOLIC VOLUME INDEX: 13.4 ML/M2
LEFT VENTRICLE SYSTOLIC VOLUME: 23 ML
LEFT VENTRICULAR INTERNAL DIMENSION IN DIASTOLE: 3.9 CM (ref 3.5–6)
LEFT VENTRICULAR MASS: 140.1 G
LV LATERAL E/E' RATIO: 15.9 M/S
LV SEPTAL E/E' RATIO: 22.2 M/S
LVED V (TEICH): 65.5 ML
LVES V (TEICH): 22.88 ML
LVOT MG: 3.25 MMHG
LVOT MV: 0.87 CM/S
MV MEAN GRADIENT: 4 MMHG
MV PEAK A VEL: 1.45 M/S
MV PEAK E VEL: 1.11 M/S
MV PEAK GRADIENT: 10 MMHG
MV STENOSIS PRESSURE HALF TIME: 84.95 MS
MV VALVE AREA BY CONTINUITY EQUATION: 2.1 CM2
MV VALVE AREA P 1/2 METHOD: 2.59 CM2
OHS CV RV/LV RATIO: 0.74 CM
PISA TR MAX VEL: 2.5 M/S
PV PEAK GRADIENT: 3 MMHG
PV PEAK VELOCITY: 0.92 M/S
RA MAJOR: 4.11 CM
RA PRESSURE ESTIMATED: 3 MMHG
RA WIDTH: 3.7 CM
RIGHT VENTRICLE DIASTOLIC BASEL DIMENSION: 2.9 CM
RIGHT VENTRICULAR END-DIASTOLIC DIMENSION: 2.94 CM
RV TB RVSP: 6 MMHG
RV TISSUE DOPPLER FREE WALL SYSTOLIC VELOCITY 1 (APICAL 4 CHAMBER VIEW): 17.9 CM/S
SINUS: 2.79 CM
STJ: 2.25 CM
TDI LATERAL: 0.07 M/S
TDI SEPTAL: 0.05 M/S
TDI: 0.06 M/S
TR MAX PG: 25 MMHG
TRICUSPID ANNULAR PLANE SYSTOLIC EXCURSION: 2.42 CM
TV REST PULMONARY ARTERY PRESSURE: 28 MMHG
Z-SCORE OF LEFT VENTRICULAR DIMENSION IN END DIASTOLE: -2.01
Z-SCORE OF LEFT VENTRICULAR DIMENSION IN END SYSTOLE: -1.32

## 2025-04-07 ENCOUNTER — OFFICE VISIT (OUTPATIENT)
Dept: CARDIOLOGY | Facility: CLINIC | Age: 80
End: 2025-04-07
Payer: MEDICARE

## 2025-04-07 VITALS
DIASTOLIC BLOOD PRESSURE: 76 MMHG | HEIGHT: 63 IN | SYSTOLIC BLOOD PRESSURE: 162 MMHG | WEIGHT: 149.25 LBS | OXYGEN SATURATION: 98 % | RESPIRATION RATE: 15 BRPM | BODY MASS INDEX: 26.45 KG/M2 | HEART RATE: 73 BPM

## 2025-04-07 DIAGNOSIS — I10 ESSENTIAL HYPERTENSION: Primary | ICD-10-CM

## 2025-04-07 DIAGNOSIS — I69.30 H/O: STROKE WITH RESIDUAL EFFECTS: ICD-10-CM

## 2025-04-07 DIAGNOSIS — E78.5 DYSLIPIDEMIA: ICD-10-CM

## 2025-04-07 DIAGNOSIS — I35.0 AORTIC VALVE STENOSIS, ETIOLOGY OF CARDIAC VALVE DISEASE UNSPECIFIED: ICD-10-CM

## 2025-04-07 PROCEDURE — 1159F MED LIST DOCD IN RCRD: CPT | Mod: CPTII,S$GLB,, | Performed by: INTERNAL MEDICINE

## 2025-04-07 PROCEDURE — 3078F DIAST BP <80 MM HG: CPT | Mod: CPTII,S$GLB,, | Performed by: INTERNAL MEDICINE

## 2025-04-07 PROCEDURE — 3288F FALL RISK ASSESSMENT DOCD: CPT | Mod: CPTII,S$GLB,, | Performed by: INTERNAL MEDICINE

## 2025-04-07 PROCEDURE — G2211 COMPLEX E/M VISIT ADD ON: HCPCS | Mod: S$GLB,,, | Performed by: INTERNAL MEDICINE

## 2025-04-07 PROCEDURE — 3077F SYST BP >= 140 MM HG: CPT | Mod: CPTII,S$GLB,, | Performed by: INTERNAL MEDICINE

## 2025-04-07 PROCEDURE — 1101F PT FALLS ASSESS-DOCD LE1/YR: CPT | Mod: CPTII,S$GLB,, | Performed by: INTERNAL MEDICINE

## 2025-04-07 PROCEDURE — 93000 ELECTROCARDIOGRAM COMPLETE: CPT | Mod: S$GLB,,, | Performed by: INTERNAL MEDICINE

## 2025-04-07 PROCEDURE — 99214 OFFICE O/P EST MOD 30 MIN: CPT | Mod: S$GLB,,, | Performed by: INTERNAL MEDICINE

## 2025-04-07 PROCEDURE — 1126F AMNT PAIN NOTED NONE PRSNT: CPT | Mod: CPTII,S$GLB,, | Performed by: INTERNAL MEDICINE

## 2025-04-07 PROCEDURE — 99999 PR PBB SHADOW E&M-EST. PATIENT-LVL IV: CPT | Mod: PBBFAC,,, | Performed by: INTERNAL MEDICINE

## 2025-04-07 NOTE — PROGRESS NOTES
CARDIOVASCULAR CONSULTATION    REASON FOR CONSULT:   Ramya Mayo is a 79 y.o. female who presents for aortic stenosis    HISTORY OF PRESENT ILLNESS:     Patient is a pleasant 77-year-old female who is here for follow-up of her aortic stenosis.  Main complaint is dyspnea on exertion which she started noticing a few months ago.  Denies any chest pains or tightness.  Per echo done in 2018 had mild aortic stenosis.      CONCLUSIONS     1 - Hyperdynamic left ventricular systolic function (EF >70%).     2 - Trivial to mild aortic stenosis, LUISANA = 1.7 cm2, AVAi = 0.98 cm2/m2, peak velocity = 2.42 m/s, mean gradient = 12 mmHg.     3 - Trivial mitral regurgitation.     4 - Trivial tricuspid regurgitation.     5 - Trivial pulmonic regurgitation.        Notes from March 2023: Patient states that she has been doing fine.  Denies any chest pains at rest on exertion, orthopnea, PND.  Can walk about half a mi without any shortness of breath.  Echo showed moderate to severe aortic stenosis.  Stress test did not show any significant ischemia.      The left ventricle is normal in size with normal systolic function.  The estimated ejection fraction is 65%.  Grade I left ventricular diastolic dysfunction.  There is moderate-to-severe aortic valve stenosis.  Aortic valve area is 1.04 cm2; peak velocity is 3.53 m/s; mean gradient is 30 mmHg.  Severe left atrial enlargement.  Mild mitral regurgitation.  Normal right ventricular size with normal right ventricular systolic function.  Mild right atrial enlargement.  Normal central venous pressure (3 mmHg).  The estimated PA systolic pressure is 30 mmHg.        Normal myocardial perfusion scan. There is no evidence of myocardial ischemia or infarction.    There is a mild intensity perfusion abnormality in the anterior wall of the left ventricle, secondary to breast attenuation.    The gated perfusion images showed an ejection fraction of 92% at rest.    There is normal wall motion at  rest and post stress.    The ECG portion of the study is negative for ischemia.    The patient reported no chest pain during the stress test.    There were no arrhythmias during stress.    Notes from March 25:      History of Present Illness    CHIEF COMPLAINT:  Ramya presents today for follow up of severe aortic stenosis.    CARDIOVASCULAR:  She has moderate to severe aortic stenosis found on echocardiogram in January 2023. Stress test in 2022 was normal with no blockages. She denies chest pain, tightness, or shortness of breath. She reports elevated blood pressure today due to anxiety and rushing through traffic and fog to get to the appointment.    MEDICAL HISTORY:  She has a history of stroke and vision in only one eye.    EXERCISE:  She does not engage in walking for exercise but uses the stairs at home, which she describes as high steps, as her primary form of physical activity.    MEDICATIONS:  She takes amlodipine, losartan, and metoprolol for hypertension, atorvastatin for hypercholesterolemia, and iron supplementation for anemia. Her diabetes medications are being managed by Dr. Stewart. Aspirin was discontinued by Dr. Sexton.    ALLERGIES:  She has indoor and outdoor allergies.         APR 25:    History of Present Illness    CHIEF COMPLAINT:  Ramya presents today for follow up    EXERCISE TOLERANCE:  She denies dyspnea on exertion and can walk extended periods without difficulty, recently demonstrating ability to shop for 3 hours while carrying a basket.       Results for orders placed or performed in visit on 04/07/25   IN OFFICE EKG 12-LEAD (to [place])    Collection Time: 04/07/25  6:02 AM   Result Value Ref Range    QRS Duration 98 ms    [facility] QTC Calculation 421 ms    Narrative    Test Reason : I10,    Vent. Rate :  70 BPM     Atrial Rate :  70 BPM     P-R Int : 142 ms          QRS Dur :  98 ms      QT Int : 390 ms       P-R-T Axes :  69  45  -7 degrees    QTcB Int : 421 ms    Poor data  quality  Normal sinus rhythm  Nonspecific ST abnormality    Abnormal ECG  When compared with ECG of 2023 08:14,  Significant change has occurred    Confirmed by Shivam Rees ([id number]) on 2025 6:37:20 PM    Referred By:            Confirmed By: Shivam Rees       Results for orders placed during the hospital encounter of 25    Echo    Interpretation Summary    Left Ventricle: The left ventricle is normal in size. There is concentric remodeling. There is normal systolic function with a visually estimated ejection fraction of 65 - 70%. Grade I diastolic dysfunction.    Right Ventricle: The right ventricle is normal in size. Systolic function is normal.    Left Atrium: Moderately dilated    Right Atrium: Right atrium is mildly dilated.    Aortic Valve: There is severe stenosis. Aortic valve area by VTI is 0.8 cm². Aortic valve peak velocity is 4.3 m/s. Mean gradient is 45 mmHg. The dimensionless index is 0.27.    Mitral Valve: There is mild to moderate regurgitation.    Tricuspid Valve: There is mild regurgitation.    Pulmonary Artery: The estimated pulmonary artery systolic pressure is 28 mmHg.    IVC/SVC: Normal venous pressure at 3 mmHg.      PAST MEDICAL HISTORY:     Past Medical History:   Diagnosis Date    Diabetes mellitus type II     Hyperlipidemia     Hypertension     Nuclear sclerosis of both eyes 2018    Stroke        PAST SURGICAL HISTORY:     Past Surgical History:   Procedure Laterality Date    4 MISCARRIAGES       8 PARA 4      HYSTERECTOMY      laser right eye  Right ? yrs    pt had stroke and tried to bring vision back        ALLERGIES AND MEDICATION:     Review of patient's allergies indicates:   Allergen Reactions    Citalopram analogues Other (See Comments)     Hallucinations        Medication List            Accurate as of 2025  1:19 PM. If you have any questions, ask your nurse or doctor.                CONTINUE taking these medications      amLODIPine 10  MG tablet  Commonly known as: NORVASC  Take 1 tablet (10 mg total) by mouth once daily.     atorvastatin 40 MG tablet  Commonly known as: LIPITOR  Take 1 tablet (40 mg total) by mouth once daily.     blood sugar diagnostic Strp  Commonly known as: TRUE METRIX GLUCOSE TEST STRIP  Daily glucose testing; whichever brand covered by insurance.     ferrous sulfate 325 mg (65 mg iron) Tab tablet  Commonly known as: FEOSOL  Take 1 tablet (325 mg total) by mouth daily with breakfast.     glipiZIDE 10 MG Tr24  Commonly known as: GLUCOTROL  Take 1 tablet (10 mg total) by mouth daily with breakfast.     lancets 33 gauge Misc  Commonly known as: ONETOUCH DELICA LANCETS  1 lancet  by Misc.(Non-Drug; Combo Route) route once daily. ONCE DAILY BLOOD SUGAR TESTING; WHICHEVER BRAND COVERED BY INSURANCE     losartan 100 MG tablet  Commonly known as: COZAAR  Take 1 tablet (100 mg total) by mouth once daily.     metFORMIN 500 MG ER 24hr tablet  Commonly known as: GLUCOPHAGE-XR  Take 2 tablets (1,000 mg total) by mouth 2 (two) times daily with meals.     metoprolol succinate 100 MG 24 hr tablet  Commonly known as: TOPROL-XL  Take 1 tablet (100 mg total) by mouth once daily.     olopatadine 0.1 % ophthalmic solution  Commonly known as: PATANOL  Place 1 drop into both eyes 2 (two) times daily.     pioglitazone 30 MG tablet  Commonly known as: ACTOS  Take 1 tablet (30 mg total) by mouth once daily.              SOCIAL HISTORY:     Social History     Socioeconomic History    Marital status:    Tobacco Use    Smoking status: Never    Smokeless tobacco: Never   Substance and Sexual Activity    Alcohol use: No    Drug use: No    Sexual activity: Not Currently   Social History Narrative    Lives in [place].    2014.         FAMILY HISTORY:     Family History   Problem Relation Name Age of Onset    Diabetes Mother      Hypertension Mother      Kidney disease Mother      Vision loss Father      Cancer Father       "Glaucoma Father      Heart disease Son      Hyperlipidemia Son      No Known Problems Sister      Prostate cancer Brother      No Known Problems Maternal Aunt      No Known Problems Maternal Uncle      No Known Problems Paternal Aunt      No Known Problems Paternal Uncle      No Known Problems Maternal Grandmother      No Known Problems Maternal Grandfather      No Known Problems Paternal Grandmother      No Known Problems Paternal Grandfather      Amblyopia Neg Hx      Blindness Neg Hx      Cataracts Neg Hx      Macular degeneration Neg Hx      Retinal detachment Neg Hx      Strabismus Neg Hx      Stroke Neg Hx      Thyroid disease Neg Hx         REVIEW OF SYSTEMS:   Review of Systems   Constitutional: Negative.   HENT: Negative.     Eyes: Negative.    Respiratory: Negative.     Endocrine: Negative.    Hematologic/Lymphatic: Negative.    Skin: Negative.    Musculoskeletal: Negative.    Gastrointestinal: Negative.    Genitourinary: Negative.    Neurological: Negative.    Psychiatric/Behavioral: Negative.     Allergic/Immunologic: Negative.        A 10 point review of systems was performed and all the pertinent positives have been mentioned. Rest of review of systems was negative.        PHYSICAL EXAM:     Vitals:    04/07/25 1258   BP: (!) 162/76   Pulse: 73   Resp: 15    Body mass index is 26.44 kg/m².  Weight: 67.7 kg (149 lb 4 oz)   Height: 5' 3" (160 cm)     Physical Exam  Constitutional:       Appearance: Normal appearance. She is well-developed.   HENT:      Head: Normocephalic.   Eyes:      Pupils: Pupils are equal, round, and reactive to light.   Cardiovascular:      Rate and Rhythm: Normal rate and regular rhythm.      Heart sounds: Murmur heard.   Pulmonary:      Effort: Pulmonary effort is normal.      Breath sounds: Normal breath sounds.   Abdominal:      General: Bowel sounds are normal.      Palpations: Abdomen is soft.      Tenderness: There is no abdominal tenderness.   Musculoskeletal:         " General: Normal range of motion.      Cervical back: Normal range of motion and neck supple.   Skin:     General: Skin is warm.   Neurological:      Mental Status: She is alert and oriented to person, place, and time.           DATA:     Laboratory:  CBC:  Recent Labs   Lab 03/25/24  0857 09/04/24  1449 01/29/25  1119   WBC 7.95 8.14 7.81   Hemoglobin 10.2 L 11.1 L 12.2   Hematocrit 35.6 L 39.1 40.7   Platelets 328 301 276       CHEMISTRIES:  Recent Labs   Lab 05/17/22  0755 09/21/22  0925 03/25/24  0857 09/04/24  1449 01/29/25  1119   Glucose 209 H   < > 149 H 133 H 179 H   Sodium 137   < > 141 138 138   Potassium 4.1   < > 4.5 3.7 4.2   BUN 13   < > 18 13 16   Creatinine 0.8   < > 0.9 0.9 0.8   eGFR if  >60.0  --   --   --   --    eGFR if non  >60.0  --   --   --   --    Calcium 9.4   < > 10.1 9.9 10.0    < > = values in this interval not displayed.       CARDIAC BIOMARKERS:        COAGS:        LIPIDS/LFTS:  Recent Labs   Lab 06/12/23  0925 09/21/23  0933 03/25/24  0857 09/04/24  1449 01/29/25  1119   Cholesterol 153  --  156  --  159   Triglycerides 225 H  --  115  --  112   HDL 50  --  54  --  55   LDL Cholesterol 58.0 L  --  79.0  --  81.6   Non-HDL Cholesterol 103  --  102  --  104   AST 30   < > 22 29 26   ALT 27   < > 14 24 23    < > = values in this interval not displayed.       Hemoglobin A1C   Date Value Ref Range Status   01/29/2025 7.0 (H) 4.0 - 5.6 % Final     Comment:     ADA Screening Guidelines:  5.7-6.4%  Consistent with prediabetes  >or=6.5%  Consistent with diabetes    High levels of fetal hemoglobin interfere with the HbA1C  assay. Heterozygous hemoglobin variants (HbS, HgC, etc)do  not significantly interfere with this assay.   However, presence of multiple variants may affect accuracy.     09/04/2024 7.4 (H) 4.0 - 5.6 % Final     Comment:     ADA Screening Guidelines:  5.7-6.4%  Consistent with prediabetes  >or=6.5%  Consistent with diabetes    High levels of  fetal hemoglobin interfere with the HbA1C  assay. Heterozygous hemoglobin variants (HbS, HgC, etc)do  not significantly interfere with this assay.   However, presence of multiple variants may affect accuracy.     03/25/2024 6.9 (H) 4.0 - 5.6 % Final     Comment:     ADA Screening Guidelines:  5.7-6.4%  Consistent with prediabetes  >or=6.5%  Consistent with diabetes    High levels of fetal hemoglobin interfere with the HbA1C  assay. Heterozygous hemoglobin variants (HbS, HgC, etc)do  not significantly interfere with this assay.   However, presence of multiple variants may affect accuracy.         TSH        The ASCVD Risk score (Johnna WELCH, et al., 2019) failed to calculate for the following reasons:    The 2019 ASCVD risk score is only valid for ages 40 to 79    Risk score cannot be calculated because patient has a medical history suggesting prior/existing ASCVD             ASSESSMENT AND PLAN     Patient Active Problem List   Diagnosis    Dyslipidemia 7/2018 changed prava to atorva    Essential hypertension    Type 2 diabetes mellitus without complication, without long-term current use of insulin; januvia expensive;1/2020 actos    H/O: stroke with residual effects    Diabetes mellitus type 2 without retinopathy    Nuclear sclerosis of both eyes    Refractive error    Nonrheumatic aortic valve stenosis mod severe monitor q6 months    RLS (restless legs syndrome)    Iron deficiency anemia    Calculus of gallbladder without cholecystitis without obstruction incidental on RUQ US 1/2021    Elevated alkaline phosphatase level, liver fraction, 1/2021 RUQ US normal except incidental gallstones    Aortic valve stenosis       Assessment & Plan    IMPRESSION:  Severe aortic stenosis, potentially requiring surgery / TAVR.  Given patient is asymptomatic and declines immediate intervention, opted for watchful waiting approach.  Will monitorvia echocardiogram every 6 months AND MONITORING OF SYMPTOMS  Discussed the pros and cons  of immediate valve replacement vs. watchful waiting.    PLAN SUMMARY:  - Echocardiogram to be completed before next visit.  - Follow-up in 6 months.    Severe aortic stenosis:  - Follow up in 6 months, patient to get echocardiogram done before next visit.  - Contact the office if missing follow-ups to keep close monitoring of valve condition.         Dyslipidemia:  On statin    Lab Results   Component Value Date    LDLCALC 81.6 01/29/2025         FOLLOW-UP:  - Follow up after 6 m with echo prior              Thank you very much for involving me in the care of your patient.  Please do not hesitate to contact me if there are any questions.      Roxanna Mcpherson MD, FAC, [facility]  Interventional Cardiologist, [facility].       Visit today included increased complexity associated with the care of the episodic problem aortic stenosis, dyslipidemia addressed and managing the longitudinal care of the patient due to the serious and/or complex managed problem(s) Problem List[1]  .    This note was dictated with the help of speech recognition software.  There might be un-intended errors and/or substitutions.                           [1]   Patient Active Problem List  Diagnosis    Dyslipidemia 7/2018 changed prava to atorva    Essential hypertension    Type 2 diabetes mellitus without complication, without long-term current use of insulin; januvia expensive;1/2020 actos    H/O: stroke with residual effects    Diabetes mellitus type 2 without retinopathy    Nuclear sclerosis of both eyes    Refractive error    Nonrheumatic aortic valve stenosis mod severe monitor q6 months    RLS (restless legs syndrome)    Iron deficiency anemia    Calculus of gallbladder without cholecystitis without obstruction incidental on RUQ US 1/2021    Elevated alkaline phosphatase level, liver fraction, 1/2021 RUQ US normal except incidental gallstones    Aortic valve stenosis

## 2025-04-08 LAB
OHS QRS DURATION: 98 MS
OHS QTC CALCULATION: 421 MS

## 2025-06-26 ENCOUNTER — LAB VISIT (OUTPATIENT)
Dept: LAB | Facility: HOSPITAL | Age: 80
End: 2025-06-26
Payer: MEDICARE

## 2025-06-26 ENCOUNTER — OFFICE VISIT (OUTPATIENT)
Dept: FAMILY MEDICINE | Facility: CLINIC | Age: 80
End: 2025-06-26
Payer: MEDICARE

## 2025-06-26 VITALS
RESPIRATION RATE: 18 BRPM | OXYGEN SATURATION: 99 % | DIASTOLIC BLOOD PRESSURE: 80 MMHG | TEMPERATURE: 98 F | HEART RATE: 73 BPM | WEIGHT: 146.94 LBS | SYSTOLIC BLOOD PRESSURE: 160 MMHG | BODY MASS INDEX: 26.03 KG/M2

## 2025-06-26 DIAGNOSIS — D50.9 IRON DEFICIENCY ANEMIA, UNSPECIFIED IRON DEFICIENCY ANEMIA TYPE: ICD-10-CM

## 2025-06-26 DIAGNOSIS — I69.30 H/O: STROKE WITH RESIDUAL EFFECTS: ICD-10-CM

## 2025-06-26 DIAGNOSIS — I10 ESSENTIAL HYPERTENSION: ICD-10-CM

## 2025-06-26 DIAGNOSIS — E11.9 DIABETES MELLITUS TYPE 2 WITHOUT RETINOPATHY: ICD-10-CM

## 2025-06-26 DIAGNOSIS — E11.9 TYPE 2 DIABETES MELLITUS WITHOUT COMPLICATION, WITHOUT LONG-TERM CURRENT USE OF INSULIN: ICD-10-CM

## 2025-06-26 DIAGNOSIS — I35.0 AORTIC VALVE STENOSIS, ETIOLOGY OF CARDIAC VALVE DISEASE UNSPECIFIED: ICD-10-CM

## 2025-06-26 DIAGNOSIS — E78.5 DYSLIPIDEMIA: ICD-10-CM

## 2025-06-26 DIAGNOSIS — I35.0 NONRHEUMATIC AORTIC VALVE STENOSIS: ICD-10-CM

## 2025-06-26 DIAGNOSIS — F43.9 STRESS: Primary | ICD-10-CM

## 2025-06-26 LAB
ERYTHROCYTE [DISTWIDTH] IN BLOOD BY AUTOMATED COUNT: 14.6 % (ref 11.5–14.5)
FERRITIN SERPL-MCNC: 93 NG/ML (ref 20–300)
HCT VFR BLD AUTO: 41.3 % (ref 37–48.5)
HGB BLD-MCNC: 12.3 GM/DL (ref 12–16)
IRON SATN MFR SERPL: 17 % (ref 20–50)
IRON SERPL-MCNC: 64 UG/DL (ref 30–160)
MCH RBC QN AUTO: 21.3 PG (ref 27–31)
MCHC RBC AUTO-ENTMCNC: 29.8 G/DL (ref 32–36)
MCV RBC AUTO: 72 FL (ref 82–98)
PLATELET # BLD AUTO: 246 K/UL (ref 150–450)
PMV BLD AUTO: 10.8 FL (ref 9.2–12.9)
RBC # BLD AUTO: 5.77 M/UL (ref 4–5.4)
RETICS/RBC NFR AUTO: 1.5 % (ref 0.5–2.5)
TIBC SERPL-MCNC: 385 UG/DL (ref 250–450)
TRANSFERRIN SERPL-MCNC: 260 MG/DL (ref 200–375)
WBC # BLD AUTO: 8.48 K/UL (ref 3.9–12.7)

## 2025-06-26 PROCEDURE — 99214 OFFICE O/P EST MOD 30 MIN: CPT | Mod: S$GLB,,, | Performed by: INTERNAL MEDICINE

## 2025-06-26 PROCEDURE — 83540 ASSAY OF IRON: CPT

## 2025-06-26 PROCEDURE — 1160F RVW MEDS BY RX/DR IN RCRD: CPT | Mod: CPTII,S$GLB,, | Performed by: INTERNAL MEDICINE

## 2025-06-26 PROCEDURE — 3288F FALL RISK ASSESSMENT DOCD: CPT | Mod: CPTII,S$GLB,, | Performed by: INTERNAL MEDICINE

## 2025-06-26 PROCEDURE — 1101F PT FALLS ASSESS-DOCD LE1/YR: CPT | Mod: CPTII,S$GLB,, | Performed by: INTERNAL MEDICINE

## 2025-06-26 PROCEDURE — 1126F AMNT PAIN NOTED NONE PRSNT: CPT | Mod: CPTII,S$GLB,, | Performed by: INTERNAL MEDICINE

## 2025-06-26 PROCEDURE — 3077F SYST BP >= 140 MM HG: CPT | Mod: CPTII,S$GLB,, | Performed by: INTERNAL MEDICINE

## 2025-06-26 PROCEDURE — 85045 AUTOMATED RETICULOCYTE COUNT: CPT

## 2025-06-26 PROCEDURE — G2211 COMPLEX E/M VISIT ADD ON: HCPCS | Mod: S$GLB,,, | Performed by: INTERNAL MEDICINE

## 2025-06-26 PROCEDURE — 85027 COMPLETE CBC AUTOMATED: CPT

## 2025-06-26 PROCEDURE — 1159F MED LIST DOCD IN RCRD: CPT | Mod: CPTII,S$GLB,, | Performed by: INTERNAL MEDICINE

## 2025-06-26 PROCEDURE — 36415 COLL VENOUS BLD VENIPUNCTURE: CPT | Mod: PO

## 2025-06-26 PROCEDURE — 82728 ASSAY OF FERRITIN: CPT

## 2025-06-26 PROCEDURE — 99999 PR PBB SHADOW E&M-EST. PATIENT-LVL IV: CPT | Mod: PBBFAC,,, | Performed by: INTERNAL MEDICINE

## 2025-06-26 PROCEDURE — 3079F DIAST BP 80-89 MM HG: CPT | Mod: CPTII,S$GLB,, | Performed by: INTERNAL MEDICINE

## 2025-06-26 RX ORDER — HYDROXYZINE HYDROCHLORIDE 10 MG/1
10 TABLET, FILM COATED ORAL 2 TIMES DAILY PRN
Qty: 30 TABLET | Refills: 0 | Status: SHIPPED | OUTPATIENT
Start: 2025-06-26

## 2025-06-26 RX ORDER — HYDROCHLOROTHIAZIDE 12.5 MG/1
12.5 TABLET ORAL DAILY
Qty: 30 TABLET | Refills: 11 | Status: SHIPPED | OUTPATIENT
Start: 2025-06-26 | End: 2026-06-26

## 2025-06-26 RX ORDER — ASPIRIN 81 MG/1
81 TABLET ORAL DAILY
COMMUNITY

## 2025-06-26 NOTE — PATIENT INSTRUCTIONS
Start taking the new medicine HCTZ, once a day  Stay on all other medicines  Stay on iron    Nurse visit 3 weeks for BP check  And follow up with me 3 months.

## 2025-06-26 NOTE — PROGRESS NOTES
This note was created by combination of typed  and M-Modal dictation.  Transcription errors may be present.   This note was also generated with the assistance of ambient listening technology. Verbal consent was obtained by the patient and accompanying visitor(s) for the recording of patient appointment to facilitate this note. I attest to having reviewed and edited the generated note for accuracy, though some syntax or spelling errors may persist. Please contact the author of this note for any clarification.    Assessment and Plan:   Assessment and Plan    Assessment & Plan  Stress  Notes high stress at home.  Interpersonal issues.  Financial issues.  Also has longstanding fears with lightening and thunder going back to childhood.  She is requesting medication for this.  Trial of hydroxyzine low-dose.  Side effect profile discussed.  Discussed could increase risk of falls so exercise caution  Orders:    hydrOXYzine HCL (ATARAX) 10 MG Tab; Take 1 tablet (10 mg total) by mouth 2 (two) times daily as needed.    Type 2 diabetes mellitus without complication, without long-term current use of insulin; januvia expensive;1/2020 actos  Diabetes mellitus type 2 without retinopathy  Last A1c was good, update labs on glipizide, metformin, Actos  Orders:    Hemoglobin A1C; Future    Comprehensive Metabolic Panel; Future    CBC Without Differential; Future    Essential hypertension  Blood pressure not controlled.  Last several readings has been high.  Has a home cuff but has not checked it recently.  Can not recall but the readings were the last time she checked.  On Toprol-XL, losartan, amlodipine.  Max dose.    Add HCTZ 12.5   Nurse visit 2 weeks for BP check  Orders:    hydroCHLOROthiazide 12.5 MG Tab; Take 1 tablet (12.5 mg total) by mouth once daily.    Dyslipidemia 7/2018 changed prava to atorva  H/O: stroke with residual effects  Check labs.  On statin.  Restart aspirin.  Previously I had recommended she stop it  because of iron-deficiency.  History of stroke, no gross blood loss, patient declined further workup for iron-deficiency, restart low-dose aspirin.  Monitor  Orders:    Lipid Panel; Future    Aortic valve stenosis, etiology of cardiac valve disease unspecified  Nonrheumatic aortic valve stenosis severe monitor q6 months  Asymptomatic, severe AS, cardiology plan is to check her every 6 months with echo       Iron deficiency anemia, unspecified iron deficiency anemia type   Restart aspirin.  Previously I had recommended she stop it because of iron-deficiency.  History of stroke, no gross blood loss, patient declined further workup for iron-deficiency, restart low-dose aspirin.  Monitor  Taking iron, check ferritin  Orders:    Ferritin; Future        There are no discontinued medications.    meds sent this encounter:  Medications Ordered This Encounter   Medications    hydroCHLOROthiazide 12.5 MG Tab     Sig: Take 1 tablet (12.5 mg total) by mouth once daily.     Dispense:  30 tablet     Refill:  11     .    hydrOXYzine HCL (ATARAX) 10 MG Tab     Sig: Take 1 tablet (10 mg total) by mouth 2 (two) times daily as needed.     Dispense:  30 tablet     Refill:  0         Follow Up:   Future Appointments   Date Time Provider Department Center   2025  2:20 PM Kameron Donato MD [facility]         Subjective:   Subjective   Chief Complaint   Patient presents with    Hypertension       HPI  Ramya is a 79 y.o. female.    Social History     Socioeconomic History    Marital status:    Tobacco Use    Smoking status: Never    Smokeless tobacco: Never   Substance and Sexual Activity    Alcohol use: No    Drug use: No    Sexual activity: Not Currently   Social History Narrative    Lives in [place].    2014.         No LMP recorded. Patient has had a hysterectomy.    Last appointment with this clinic was 2025. Last visit with me 2025   To summarize last visit and events leading  up to today:  Diabetes with peripheral artery disease.  A1c went up.  History of Rybelsus but not able to use consistent because of issues with the donut hole.  I started her on pioglitazone.  Stay on glipizide, metformin, increase pioglitazone  Hypertension borderline readings last visit   Hyperlipidemia triglycerides high, non HDL good.  Statin.    Aortic valve stenosis moderate severe, followed by Cardiology.  Plan is routine follow-up monitoring with echo  Hair loss, iron deficiency, restless legs, last ferritin check was less than 50.  Last visit not taking dedicated iron supplement.  I sent in a prescription.  No longer taking ropinirole.  Microcytic anemia suspicious for thalassemia.       Cardiology appointment and Cardiology echo canceled      Last visit with me 09/2023   Diabetes stable on metformin Actos and glipizide.  History of Rybelsus, when she gets a donut hole it is too expensive.  Needs to follow up with Cardiology regarding aortic stenosis     03/25/2024 labs  CMP normal   CBC microcytic anemia stable   Ferritin low 12  A1c 6.9     She had a follow up appointment with me 04/01/2024, she canceled     I had ordered EGD and colonoscopy  Intake phone call 06/12/2024 canceled       Last visit with me 09/04/2024   Iron-deficiency, never had a colonoscopy.  She has never had 1 and she is wary.  Start iron.  Referred for GI scheduling   Aortic valve stenosis overdue for cardiology follow-up.  Ordered echo in anticipation of follow-up   Diabetes reportedly taking metformin Januvia glipizide.  Check A1c though maybe false low because of iron-deficiency anemia   Hypertension stable   Allergic conjunctivitis trial of Patanol     CMP WNL  A1c 7.4 from 6.9  CBC anemia though improved compared to previous  Ferritin slightly better compared to previous  Iron sat low  Retic count normal  Microalbuminuria new     Discussed at OV - start iron  Has EGD/colonoscopy intake call scheduled.     Repeat microalb with next  labs.       GI made Several attempts to schedule colonoscopy  but were not able to reach the patient.      Had cardiology appointment 01/09/2025, canceled    Last visit with me 01/29/2025  Diabetes check labs, on Actos metformin and glipizide.  Goal to be less than 8.0   Iron-deficiency anemia.  Dark stool has been no gross blood.  Taking iron regularly no side effects.  She does not want to pursue further workup  Hypertension stable   Lipid statin   Aortic valve stenosis overdue for cardiology follow up    A1c 7.0 from previous 7.4   CMP normal   CBC anemia improving hemoglobin normal MCV improving with likely underlying thalassemia   Ferritin improving 68 from previous 24    Lipid good     Results to pt. No changes.  Follow up 3 months. If iron continues to improve, stop when ferritin close to 100    Saw cardiology 03/14/2025.  Repeat TTE.  He recommended restarting aspirin    03/28/2025 TTE LVEF 65-70%.  Grade 1 diastolic dysfunction.  Severe aortic stenosis    04/07/2025 saw cardiology in follow-up.  Given patient is asymptomatic watchful waiting.  Plan for echo every 6 months    Restart ASA        Today's visit:    History of Present Illness    HPI:  Ramya reports stress and anxiety due to a series of household problems, including a non-functional air conditioning unit, refrigerator issues, and roof problems. Her son provided a window unit as a temporary solution, but it is insufficient to cool the entire house. These problems have been occurring sequentially, contributing to her stress.    She expresses particular distress related to thunderstorms, attributing this to a negative experience with lightning and thunder in her youth. This fear is severe enough that she requires companionship during thunderstorms. The combination of heat and thunder is especially distressing for her.    She acknowledges difficulty managing her anxiety and stress. She is aware of deep breathing exercises from television but has  not actively practiced them. She expresses interest in medication for anxiety and stress management, including OTC options.    She reports a decreased appetite, though the relationship to her stress and anxiety is unclear.    She denies seeing blood in her stool or anywhere else. She denies having a high blood pressure reading when she last checked at home about a week ago.    MEDICATIONS:  - Amlodipine, for blood pressure  - Losartan, for blood pressure  - Metoprolol, for blood pressure and heart  - Statin, for cholesterol  - Glipizide, for blood sugar  - Metformin, for blood sugar  - Actos, for blood sugar  - Iron supplements  - Discontinued aspirin 1 week ago    ROS:  Gastrointestinal: no blood in stool, reports loss of appetite  Psychiatric: reports anxiety         Patient Care Team:  Kameron Donato MD as PCP - General (Internal Medicine)  Tessa Baltazar OD as Consulting Physician (Optometry)      Patient Active Problem List    Diagnosis Date Noted    Aortic valve stenosis 03/14/2025    Calculus of gallbladder without cholecystitis without obstruction incidental on RUQ US 1/2021 01/22/2021 1/22/2021 RUQ US: Cholelithiasis.  No gallbladder wall thickening or pericholecystic fluid.  Negative sonographic Taylor sign.      Elevated alkaline phosphatase level, liver fraction, 1/2021 RUQ US normal except incidental gallstones 01/22/2021    Iron deficiency anemia 04/09/2019    RLS (restless legs syndrome) 07/05/2018    Nonrheumatic aortic valve stenosis mod severe monitor q6 months 06/06/2018 01/11/2023 TTE LV normal size and systolic function LVEF 65%.  Grade 1 diastolic dysfunction.  Moderate to severe aortic stenosis.  01/11/2023 nuclear stress test negative for ischemia      Nuclear sclerosis of both eyes 05/11/2018    Refractive error 05/11/2018    Diabetes mellitus type 2 without retinopathy 09/20/2016    Dyslipidemia 7/2018 changed prava to atorva 09/18/2012    Essential hypertension 09/18/2012    Type 2  diabetes mellitus without complication, without long-term current use of insulin; januvia expensive;1/2020 actos 09/18/2012    H/O: stroke with residual effects 09/18/2012       PAST MEDICAL PROBLEMS, PAST SURGICAL HISTORY: please see relevant portions of the electronic medical record    ALLERGIES AND MEDICATIONS: updated and reviewed.  Medication List with Changes/Refills   Current Medications    AMLODIPINE (NORVASC) 10 MG TABLET    Take 1 tablet (10 mg total) by mouth once daily.    ATORVASTATIN (LIPITOR) 40 MG TABLET    Take 1 tablet (40 mg total) by mouth once daily.    BLOOD SUGAR DIAGNOSTIC (TRUE METRIX GLUCOSE TEST STRIP) STRP    Daily glucose testing; whichever brand covered by insurance.    FERROUS SULFATE (FEOSOL) 325 MG (65 MG IRON) TAB TABLET    Take 1 tablet (325 mg total) by mouth daily with breakfast.    GLIPIZIDE (GLUCOTROL) 10 MG TR24    Take 1 tablet (10 mg total) by mouth daily with breakfast.    LANCETS (ONETOUCH DELICA LANCETS) 33 GAUGE MISC    1 lancet  by Misc.(Non-Drug; Combo Route) route once daily. ONCE DAILY BLOOD SUGAR TESTING; WHICHEVER BRAND COVERED BY INSURANCE    LOSARTAN (COZAAR) 100 MG TABLET    Take 1 tablet (100 mg total) by mouth once daily.    METFORMIN (GLUCOPHAGE-XR) 500 MG ER 24HR TABLET    Take 2 tablets (1,000 mg total) by mouth 2 (two) times daily with meals.    METOPROLOL SUCCINATE (TOPROL-XL) 100 MG 24 HR TABLET    Take 1 tablet (100 mg total) by mouth once daily.    OLOPATADINE (PATANOL) 0.1 % OPHTHALMIC SOLUTION    Place 1 drop into both eyes 2 (two) times daily.    PIOGLITAZONE (ACTOS) 30 MG TABLET    Take 1 tablet (30 mg total) by mouth once daily.         Objective:   Objective   Physical Exam   Vitals:    06/26/25 1411   BP: (!) 160/80   BP Location: Right arm   Patient Position: Sitting   Pulse: 73   Resp: 18   Temp: 97.9 °F (36.6 °C)   TempSrc: Oral   SpO2: 99%   Weight: 66.7 kg (146 lb 15 oz)    Body mass index is 26.03 kg/m².            Physical  Exam  Constitutional:       General: She is not in acute distress.     Appearance: She is well-developed.   Eyes:      Extraocular Movements: Extraocular movements intact.   Cardiovascular:      Rate and Rhythm: Normal rate and regular rhythm.      Heart sounds: Murmur heard.      Comments: 3/6 systolic murmur  Pulmonary:      Effort: Pulmonary effort is normal.      Breath sounds: Normal breath sounds.   Musculoskeletal:         General: Normal range of motion.      Right lower leg: No edema.      Left lower leg: No edema.   Skin:     General: Skin is warm and dry.   Neurological:      Mental Status: She is alert and oriented to person, place, and time.      Coordination: Coordination normal.   Psychiatric:         Behavior: Behavior normal.         Thought Content: Thought content normal.

## 2025-06-26 NOTE — ASSESSMENT & PLAN NOTE
Blood pressure not controlled.  Last several readings has been high.  Has a home cuff but has not checked it recently.  Can not recall but the readings were the last time she checked.  On Toprol-XL, losartan, amlodipine.  Max dose.    Add HCTZ 12.5   Nurse visit 2 weeks for BP check  Orders:    hydroCHLOROthiazide 12.5 MG Tab; Take 1 tablet (12.5 mg total) by mouth once daily.

## 2025-06-26 NOTE — ASSESSMENT & PLAN NOTE
Restart aspirin.  Previously I had recommended she stop it because of iron-deficiency.  History of stroke, no gross blood loss, patient declined further workup for iron-deficiency, restart low-dose aspirin.  Monitor  Taking iron, check ferritin  Orders:    Ferritin; Future

## 2025-06-26 NOTE — ASSESSMENT & PLAN NOTE
Last A1c was good, update labs on glipizide, metformin, Actos  Orders:    Hemoglobin A1C; Future    Comprehensive Metabolic Panel; Future    CBC Without Differential; Future

## 2025-06-26 NOTE — ASSESSMENT & PLAN NOTE
Check labs.  On statin.  Restart aspirin.  Previously I had recommended she stop it because of iron-deficiency.  History of stroke, no gross blood loss, patient declined further workup for iron-deficiency, restart low-dose aspirin.  Monitor  Orders:    Lipid Panel; Future

## 2025-06-27 ENCOUNTER — RESULTS FOLLOW-UP (OUTPATIENT)
Dept: FAMILY MEDICINE | Facility: CLINIC | Age: 80
End: 2025-06-27

## 2025-06-27 ENCOUNTER — RESULTS FOLLOW-UP (OUTPATIENT)
Dept: FAMILY MEDICINE | Facility: CLINIC | Age: 80
End: 2025-06-27
Payer: MEDICARE

## 2025-06-27 NOTE — PROGRESS NOTES
CBC microcytosis likely underlying thalassemia  Ferritin good  Retic count normal  Microalbumin normal on repeat    I meant to order CMP, A1c, but those are ordered for next visit.  Last A1c was okay.    No changes

## 2025-06-27 NOTE — LETTER
June 27, 2025    Ramya Mayo  [address]  [place] [address]             [facility]  [address] [facility]  [place] [address]  Phone: [phone]  Fax: [phone] Dear Ramya Mayo,     It was nice to see you at your recent office visit.     To summarize your health and your results:  Your labs were stable.     Recommendations and medication additions/changes:  No changes to your medications - stay on your current medicines.  You can continue your iron.     Follow up: Keep your future appointments:          Future Appointments   Date Time Provider Department Center   7/17/2025 10:00 AM NURSE, [facility] [place]   9/30/2025  1:40 PM [facility]      Your lab values were as follows:  Complete blood count (WBC - white blood cells which fight infection; hemoglobin [Hb] and hematocrit [Hct] - red blood cells which carry oxygen; platelets [plt] - help with blood clotting) - normal.  Ferritin, test of iron stores-normal  Urine microalbumin test was normal.      Resulted Orders   CBC Without Differential   Result Value Ref Range    WBC 8.48 3.90 - 12.70 K/uL    RBC 5.77 (H) 4.00 - 5.40 M/uL    HGB 12.3 12.0 - 16.0 gm/dL    HCT 41.3 37.0 - 48.5 %    MCV 72 (L) 82 - 98 fL    MCHC 29.8 (L) 32.0 - 36.0 g/dL    RDW 14.6 (H) 11.5 - 14.5 %    Platelet Count 246 150 - 450 K/uL    MCH 21.3 (L) 27.0 - 31.0 pg    MPV 10.8 9.2 - 12.9 fL   Reticulocytes   Result Value Ref Range    Retic Count, Automated 1.5 0.5 - 2.5 %   Ferritin   Result Value Ref Range    Ferritin 93.0 20.0 - 300.0 ng/mL   Iron and TIBC   Result Value Ref Range    Iron Level 64 30 - 160 ug/dL    Transferrin 260 200 - 375 mg/dL    Iron Binding Capacity Total 385 250 - 450 ug/dL    Iron Saturation 17 (L) 20 - 50 %     Component      Latest Ref Rn 6/26/2025   MICROALB/CREAT RATIO      <=30.0 ug/mg 20.8        Wishing you good health,    Kameron Donato MD